# Patient Record
Sex: FEMALE | Race: BLACK OR AFRICAN AMERICAN | Employment: FULL TIME | URBAN - METROPOLITAN AREA
[De-identification: names, ages, dates, MRNs, and addresses within clinical notes are randomized per-mention and may not be internally consistent; named-entity substitution may affect disease eponyms.]

---

## 2017-08-04 LAB
AVERAGE GLUCOSE: NORMAL
HBA1C MFR BLD: 12 %

## 2018-09-26 ENCOUNTER — TELEPHONE (OUTPATIENT)
Dept: PRIMARY CARE CLINIC | Age: 33
End: 2018-09-26

## 2018-09-26 NOTE — TELEPHONE ENCOUNTER
Pt would like to Re-Establish care with Dr. Marcelino Jauregui last seen in 2014  Pls call pt if able to reschedule

## 2018-11-03 ENCOUNTER — HOSPITAL ENCOUNTER (EMERGENCY)
Age: 33
Discharge: HOME | End: 2018-11-04
Attending: EMERGENCY MEDICINE
Payer: COMMERCIAL

## 2018-11-03 VITALS
DIASTOLIC BLOOD PRESSURE: 123 MMHG | TEMPERATURE: 97.8 F | OXYGEN SATURATION: 100 % | HEART RATE: 109 BPM | SYSTOLIC BLOOD PRESSURE: 159 MMHG

## 2018-11-03 DIAGNOSIS — R20.2 PARESTHESIA: Primary | ICD-10-CM

## 2018-11-03 LAB
BACTERIA: ABNORMAL /HPF
BILIRUBIN URINE: NEGATIVE MG/DL
BLOOD, URINE: NEGATIVE
CLARITY: ABNORMAL
COLOR: ABNORMAL
EPITHELIAL CELLS, UA: ABNORMAL /HPF
GLUCOSE BLD-MCNC: 204 MG/DL (ref 70–99)
GLUCOSE URINE: 500 MG/DL
KETONES, URINE: NEGATIVE MG/DL
LEUKOCYTE ESTERASE, URINE: NEGATIVE
MICROSCOPIC EXAMINATION: YES
NITRITE, URINE: NEGATIVE
PERFORMED ON: ABNORMAL
PH UA: 6
PREGNANCY, URINE: NEGATIVE
PROTEIN UA: 30 MG/DL
RBC UA: ABNORMAL /HPF (ref 0–2)
SPECIFIC GRAVITY UA: 1.01
UROBILINOGEN, URINE: 0.2 E.U./DL
WBC UA: ABNORMAL /HPF (ref 0–5)

## 2018-11-03 PROCEDURE — 81001 URINALYSIS AUTO W/SCOPE: CPT

## 2018-11-03 PROCEDURE — 83735 ASSAY OF MAGNESIUM: CPT

## 2018-11-03 PROCEDURE — 84703 CHORIONIC GONADOTROPIN ASSAY: CPT

## 2018-11-03 PROCEDURE — 80048 BASIC METABOLIC PNL TOTAL CA: CPT

## 2018-11-03 PROCEDURE — 83036 HEMOGLOBIN GLYCOSYLATED A1C: CPT

## 2018-11-03 PROCEDURE — 84100 ASSAY OF PHOSPHORUS: CPT

## 2018-11-03 PROCEDURE — 99284 EMERGENCY DEPT VISIT MOD MDM: CPT

## 2018-11-03 PROCEDURE — 85025 COMPLETE CBC W/AUTO DIFF WBC: CPT

## 2018-11-04 LAB
ANION GAP SERPL CALCULATED.3IONS-SCNC: 14 MMOL/L (ref 3–16)
BASOPHILS ABSOLUTE: 0 K/UL (ref 0–0.2)
BASOPHILS RELATIVE PERCENT: 0.7 %
BUN BLDV-MCNC: 12 MG/DL (ref 7–20)
CALCIUM SERPL-MCNC: 9.7 MG/DL (ref 8.3–10.6)
CHLORIDE BLD-SCNC: 97 MMOL/L (ref 99–110)
CO2: 26 MMOL/L (ref 21–32)
CREAT SERPL-MCNC: 0.6 MG/DL (ref 0.6–1.1)
EOSINOPHILS ABSOLUTE: 0.1 K/UL (ref 0–0.6)
EOSINOPHILS RELATIVE PERCENT: 2.1 %
ESTIMATED AVERAGE GLUCOSE: 320.7 MG/DL
GFR AFRICAN AMERICAN: >60
GFR NON-AFRICAN AMERICAN: >60
GLUCOSE BLD-MCNC: 186 MG/DL (ref 70–99)
HBA1C MFR BLD: 12.8 %
HCT VFR BLD CALC: 39.8 % (ref 36–48)
HEMOGLOBIN: 13.4 G/DL (ref 12–16)
LYMPHOCYTES ABSOLUTE: 3.4 K/UL (ref 1–5.1)
LYMPHOCYTES RELATIVE PERCENT: 55.9 %
MAGNESIUM: 1.7 MG/DL (ref 1.8–2.4)
MCH RBC QN AUTO: 28.7 PG (ref 26–34)
MCHC RBC AUTO-ENTMCNC: 33.7 G/DL (ref 31–36)
MCV RBC AUTO: 85.1 FL (ref 80–100)
MONOCYTES ABSOLUTE: 0.4 K/UL (ref 0–1.3)
MONOCYTES RELATIVE PERCENT: 6.7 %
NEUTROPHILS ABSOLUTE: 2.1 K/UL (ref 1.7–7.7)
NEUTROPHILS RELATIVE PERCENT: 34.6 %
PDW BLD-RTO: 13.5 % (ref 12.4–15.4)
PHOSPHORUS: 3.9 MG/DL (ref 2.5–4.9)
PLATELET # BLD: 282 K/UL (ref 135–450)
PMV BLD AUTO: 7.8 FL (ref 5–10.5)
POTASSIUM SERPL-SCNC: 3.9 MMOL/L (ref 3.5–5.1)
RBC # BLD: 4.67 M/UL (ref 4–5.2)
SODIUM BLD-SCNC: 137 MMOL/L (ref 136–145)
WBC # BLD: 6.2 K/UL (ref 4–11)

## 2018-11-04 RX ORDER — GLIPIZIDE 5 MG/1
5 TABLET ORAL
Qty: 20 TABLET | Refills: 0 | Status: SHIPPED | OUTPATIENT
Start: 2018-11-04 | End: 2018-11-20 | Stop reason: SDUPTHER

## 2018-11-04 RX ORDER — METFORMIN HYDROCHLORIDE 500 MG/1
500 TABLET, EXTENDED RELEASE ORAL
Qty: 14 TABLET | Refills: 0 | Status: SHIPPED | OUTPATIENT
Start: 2018-11-04 | End: 2018-11-20 | Stop reason: SDUPTHER

## 2018-11-04 RX ORDER — GABAPENTIN 100 MG/1
100 CAPSULE ORAL 2 TIMES DAILY
Qty: 20 CAPSULE | Refills: 0 | Status: SHIPPED | OUTPATIENT
Start: 2018-11-04 | End: 2018-11-20

## 2018-11-04 NOTE — ED PROVIDER NOTES
Date ofevaluation: 11/3/2018    Chief Complaint   Emesis (Pt has been getting sick for the past two days. Pt took glipizide 20 mg from a nurse at work at [phone], but pt does not take any medications normally. BS went from 500s to 200s in an hour.) and Hyperglycemia      Nursing Notes, Past Medical Hx, Past Surgical Hx, Social Hx, Allergies, and Family Hx were reviewed. History of Present Illness     Maisha Paul is a 35 y.o. female who presents For evaluation of left handed numbness as well as bilateral feet tingling and hyperglycemia at work today. She describes chronic left handed numbness, and bilateral lower extremity feet tingling for many years. There is tardive augmentation she's been seen for this before. Patient also has a history of hypertension, diabetes. She states she has been noncompliant with medications for greater than 1 year secondary to being lost to follow-up secondary to consultations related to insurance another interpersonal issues. Patient is otherwise without chest pain, shortness of breath, lightheadedness, dizziness, visual, or other new onset motor sensory deficits.     Review of Systems     Review of Systems     10 point review of systems was performed at bedside and negative unless otherwise specified in HPI    Past Medical, Surgical, Family, and Social History         Diagnosis Date    Asthma     Hypertension     Miscarriage     Morbid obesity ([facility] [address]     Type II or unspecified type diabetes mellitus without mention of complication, not stated as uncontrolled     Unspecified noninflammatory disorder of vulva and perineum          Procedure Laterality Date    CHOLECYSTECTOMY, LAPAROSCOPIC  2/07    OTHER SURGICAL HISTORY  07/18/2014    COLPOSCOPY OF VULVA, VAGINA AND CERVIX WITH CO-2 LASER     Her family history includes Asthma in her father; Breast Cancer in her maternal grandmother; Cancer in her maternal grandmother; Diabetes in her paternal grandmother; High Blood

## 2018-11-20 ENCOUNTER — OFFICE VISIT (OUTPATIENT)
Dept: PRIMARY CARE CLINIC | Age: 33
End: 2018-11-20
Payer: COMMERCIAL

## 2018-11-20 VITALS
SYSTOLIC BLOOD PRESSURE: 198 MMHG | TEMPERATURE: 97.9 F | BODY MASS INDEX: 44.42 KG/M2 | WEIGHT: 283 LBS | OXYGEN SATURATION: 99 % | HEART RATE: 118 BPM | DIASTOLIC BLOOD PRESSURE: 116 MMHG | HEIGHT: 67 IN

## 2018-11-20 DIAGNOSIS — M25.561 CHRONIC PAIN OF RIGHT KNEE: ICD-10-CM

## 2018-11-20 DIAGNOSIS — I10 HYPERTENSION, UNSPECIFIED TYPE: Primary | ICD-10-CM

## 2018-11-20 DIAGNOSIS — G89.29 CHRONIC PAIN OF RIGHT KNEE: ICD-10-CM

## 2018-11-20 DIAGNOSIS — E66.01 MORBID OBESITY (HCC): ICD-10-CM

## 2018-11-20 LAB
GLUCOSE BLD-MCNC: 285 MG/DL
HBA1C MFR BLD: 12.2 %

## 2018-11-20 PROCEDURE — G8484 FLU IMMUNIZE NO ADMIN: HCPCS | Performed by: FAMILY MEDICINE

## 2018-11-20 PROCEDURE — 96160 PT-FOCUSED HLTH RISK ASSMT: CPT | Performed by: FAMILY MEDICINE

## 2018-11-20 PROCEDURE — G8427 DOCREV CUR MEDS BY ELIG CLIN: HCPCS | Performed by: FAMILY MEDICINE

## 2018-11-20 PROCEDURE — 99204 OFFICE O/P NEW MOD 45 MIN: CPT | Performed by: FAMILY MEDICINE

## 2018-11-20 PROCEDURE — G8417 CALC BMI ABV UP PARAM F/U: HCPCS | Performed by: FAMILY MEDICINE

## 2018-11-20 PROCEDURE — 82962 GLUCOSE BLOOD TEST: CPT | Performed by: FAMILY MEDICINE

## 2018-11-20 PROCEDURE — 1036F TOBACCO NON-USER: CPT | Performed by: FAMILY MEDICINE

## 2018-11-20 PROCEDURE — 93000 ELECTROCARDIOGRAM COMPLETE: CPT | Performed by: FAMILY MEDICINE

## 2018-11-20 PROCEDURE — 2022F DILAT RTA XM EVC RTNOPTHY: CPT | Performed by: FAMILY MEDICINE

## 2018-11-20 PROCEDURE — 83036 HEMOGLOBIN GLYCOSYLATED A1C: CPT | Performed by: FAMILY MEDICINE

## 2018-11-20 PROCEDURE — 3046F HEMOGLOBIN A1C LEVEL >9.0%: CPT | Performed by: FAMILY MEDICINE

## 2018-11-20 RX ORDER — DULOXETIN HYDROCHLORIDE 30 MG/1
30 CAPSULE, DELAYED RELEASE ORAL 2 TIMES DAILY
Qty: 30 CAPSULE | Refills: 2 | Status: SHIPPED | OUTPATIENT
Start: 2018-11-20 | End: 2019-11-19 | Stop reason: SDUPTHER

## 2018-11-20 RX ORDER — TIZANIDINE HYDROCHLORIDE 6 MG/1
6 CAPSULE, GELATIN COATED ORAL 2 TIMES DAILY
COMMUNITY
End: 2018-12-19

## 2018-11-20 RX ORDER — GLUCOSAMINE HCL/CHONDROITIN SU 500-400 MG
CAPSULE ORAL
Qty: 100 STRIP | Refills: 11 | Status: SHIPPED | OUTPATIENT
Start: 2018-11-20 | End: 2019-11-19 | Stop reason: SDUPTHER

## 2018-11-20 RX ORDER — PEN NEEDLE, DIABETIC 31 GX5/16"
NEEDLE, DISPOSABLE MISCELLANEOUS
Qty: 100 EACH | Refills: 10 | Status: SHIPPED | OUTPATIENT
Start: 2018-11-20 | End: 2019-11-19 | Stop reason: SDUPTHER

## 2018-11-20 RX ORDER — LISINOPRIL AND HYDROCHLOROTHIAZIDE 25; 20 MG/1; MG/1
1 TABLET ORAL DAILY
Qty: 30 TABLET | Refills: 2 | Status: SHIPPED | OUTPATIENT
Start: 2018-11-20 | End: 2019-11-19 | Stop reason: SINTOL

## 2018-11-20 RX ORDER — GLIPIZIDE 5 MG/1
5 TABLET ORAL
Qty: 60 TABLET | Refills: 0 | Status: SHIPPED | OUTPATIENT
Start: 2018-11-20 | End: 2019-02-05 | Stop reason: SDUPTHER

## 2018-11-20 RX ORDER — AMLODIPINE BESYLATE 5 MG/1
5 TABLET ORAL DAILY
Qty: 30 TABLET | Refills: 2 | Status: SHIPPED | OUTPATIENT
Start: 2018-11-20 | End: 2018-12-17 | Stop reason: SDUPTHER

## 2018-11-20 RX ORDER — GABAPENTIN 300 MG/1
300 CAPSULE ORAL 2 TIMES DAILY
Qty: 60 CAPSULE | Refills: 5 | Status: SHIPPED | OUTPATIENT
Start: 2018-11-20 | End: 2019-11-19 | Stop reason: SDUPTHER

## 2018-11-20 RX ORDER — LANCETS 30 GAUGE
EACH MISCELLANEOUS
Qty: 100 EACH | Refills: 10 | Status: SHIPPED | OUTPATIENT
Start: 2018-11-20 | End: 2019-11-19 | Stop reason: SDUPTHER

## 2018-11-20 RX ORDER — ACETAMINOPHEN 500 MG
TABLET ORAL
Qty: 120 TABLET | Refills: 3 | Status: SHIPPED | OUTPATIENT
Start: 2018-11-20 | End: 2021-11-11 | Stop reason: SDUPTHER

## 2018-11-20 RX ORDER — METFORMIN HYDROCHLORIDE 500 MG/1
500 TABLET, EXTENDED RELEASE ORAL
Qty: 30 TABLET | Refills: 0 | Status: SHIPPED | OUTPATIENT
Start: 2018-11-20 | End: 2019-02-05 | Stop reason: SDUPTHER

## 2018-11-20 RX ORDER — DULOXETIN HYDROCHLORIDE 30 MG/1
30 CAPSULE, DELAYED RELEASE ORAL 2 TIMES DAILY
COMMUNITY
End: 2018-11-20 | Stop reason: SDUPTHER

## 2018-11-20 RX ORDER — TIZANIDINE 4 MG/1
TABLET ORAL
Qty: 30 TABLET | Refills: 0 | Status: SHIPPED | OUTPATIENT
Start: 2018-11-20 | End: 2018-12-19 | Stop reason: SDUPTHER

## 2018-11-20 ASSESSMENT — ENCOUNTER SYMPTOMS
DIARRHEA: 0
EYE ITCHING: 0
BACK PAIN: 0
CONSTIPATION: 0
RHINORRHEA: 0
SCALP TENDERNESS: 0
APNEA: 0
FACIAL SWEATING: 0
RECTAL PAIN: 0
PHOTOPHOBIA: 0
BLURRED VISION: 0
EYE REDNESS: 0
STRIDOR: 0
NAUSEA: 0
WHEEZING: 0
SORE THROAT: 0
CHOKING: 0
EYE PAIN: 0
EYE DISCHARGE: 0
EYE WATERING: 0
SHORTNESS OF BREATH: 0
BLOOD IN STOOL: 0
ABDOMINAL DISTENTION: 0
VOMITING: 0
SINUS PRESSURE: 0
CHEST TIGHTNESS: 0
COLOR CHANGE: 0
TROUBLE SWALLOWING: 0
COUGH: 0

## 2018-11-20 ASSESSMENT — PATIENT HEALTH QUESTIONNAIRE - PHQ9
2. FEELING DOWN, DEPRESSED OR HOPELESS: 3
9. THOUGHTS THAT YOU WOULD BE BETTER OFF DEAD, OR OF HURTING YOURSELF: 3
6. FEELING BAD ABOUT YOURSELF - OR THAT YOU ARE A FAILURE OR HAVE LET YOURSELF OR YOUR FAMILY DOWN: 0
1. LITTLE INTEREST OR PLEASURE IN DOING THINGS: 3
5. POOR APPETITE OR OVEREATING: 2
SUM OF ALL RESPONSES TO PHQ QUESTIONS 1-9: 21
SUM OF ALL RESPONSES TO PHQ9 QUESTIONS 1 & 2: 6
3. TROUBLE FALLING OR STAYING ASLEEP: 2
4. FEELING TIRED OR HAVING LITTLE ENERGY: 3
8. MOVING OR SPEAKING SO SLOWLY THAT OTHER PEOPLE COULD HAVE NOTICED. OR THE OPPOSITE, BEING SO FIGETY OR RESTLESS THAT YOU HAVE BEEN MOVING AROUND A LOT MORE THAN USUAL: 2
SUM OF ALL RESPONSES TO PHQ QUESTIONS 1-9: 21
10. IF YOU CHECKED OFF ANY PROBLEMS, HOW DIFFICULT HAVE THESE PROBLEMS MADE IT FOR YOU TO DO YOUR WORK, TAKE CARE OF THINGS AT HOME, OR GET ALONG WITH OTHER PEOPLE: 3
7. TROUBLE CONCENTRATING ON THINGS, SUCH AS READING THE NEWSPAPER OR WATCHING TELEVISION: 3

## 2018-11-20 NOTE — PROGRESS NOTES
Subjective:      Patient ID: Elisa Hernandez is a 35 y.o. female. New pt visit for dm-2, neuropathy, obesity, numbness left hand, hypertension  Headache    This is a chronic problem. The current episode started more than 1 month ago. The problem occurs constantly. The problem has been waxing and waning. Pain location: frontal area, nausea. The pain does not radiate. The pain is at a severity of 6/10. The pain is moderate. Associated symptoms include numbness. Pertinent negatives include no back pain, blurred vision, coughing, dizziness, drainage, ear pain, eye pain, eye redness, eye watering, facial sweating, fever, hearing loss, insomnia, loss of balance, muscle aches, nausea, neck pain, phonophobia, photophobia, rhinorrhea, scalp tenderness, seizures, sinus pressure, sore throat, tingling, tinnitus, vomiting, weakness or weight loss. Nothing aggravates the symptoms. She has tried acetaminophen and NSAIDs for the symptoms. The treatment provided no relief. Her past medical history is significant for obesity. There is no history of cancer, cluster headaches, hypertension, migraine headaches, migraines in the family, pseudotumor cerebri, recent head traumas, sinus disease or TMJ.   34 y/o female who is known to have poorly controlled diabetes, poorly controlled hypertension, numbness/burning pains of both feet present for 2 years. Has been seen 2 years ago while hosp. At [facility] for diagnosed neuropathy and placed  On gabapentin at that time. No fu with neurology. Current dose of gabapentin is 600 mg bid without completed relief. Has had type 2 diabetes since age 32, poorly controlled. Last AIC was \"12\". No blurred vision. Wt gain. Excessive water drinking, increased appetite. Polyuria  For past few months .  She has been out of glipizide and metformin for a few months  Until recent er visit for the same on 11/3/18, where she got refills    She has hypertension , see hpi  She has headaches, see   Has chronic R knee

## 2018-11-21 RX ORDER — TRAMADOL HYDROCHLORIDE 50 MG/1
50 TABLET ORAL EVERY 4 HOURS PRN
Qty: 18 TABLET | Refills: 0 | Status: SHIPPED | OUTPATIENT
Start: 2018-11-21 | End: 2018-11-24

## 2018-12-14 ENCOUNTER — TELEPHONE (OUTPATIENT)
Dept: PRIMARY CARE CLINIC | Age: 33
End: 2018-12-14

## 2018-12-14 ENCOUNTER — ANESTHESIA (OUTPATIENT)
Dept: OPERATING ROOM | Age: 33
End: 2018-12-14
Payer: COMMERCIAL

## 2018-12-14 ENCOUNTER — ANESTHESIA EVENT (OUTPATIENT)
Dept: OPERATING ROOM | Age: 33
End: 2018-12-14
Payer: COMMERCIAL

## 2018-12-14 ENCOUNTER — HOSPITAL ENCOUNTER (OUTPATIENT)
Age: 33
LOS: 1 days | Discharge: HOME | End: 2018-12-14
Attending: EMERGENCY MEDICINE | Admitting: SURGERY
Payer: COMMERCIAL

## 2018-12-14 ENCOUNTER — APPOINTMENT (OUTPATIENT)
Dept: CT IMAGING | Age: 33
End: 2018-12-14
Payer: COMMERCIAL

## 2018-12-14 VITALS
OXYGEN SATURATION: 100 % | SYSTOLIC BLOOD PRESSURE: 105 MMHG | TEMPERATURE: 98.6 F | DIASTOLIC BLOOD PRESSURE: 62 MMHG | RESPIRATION RATE: 7 BRPM

## 2018-12-14 VITALS
HEART RATE: 109 BPM | DIASTOLIC BLOOD PRESSURE: 99 MMHG | RESPIRATION RATE: 18 BRPM | SYSTOLIC BLOOD PRESSURE: 117 MMHG | HEIGHT: 67 IN | OXYGEN SATURATION: 100 % | WEIGHT: 293 LBS | BODY MASS INDEX: 45.99 KG/M2 | TEMPERATURE: 98.6 F

## 2018-12-14 DIAGNOSIS — I10 HYPERTENSION, UNSPECIFIED TYPE: ICD-10-CM

## 2018-12-14 DIAGNOSIS — K35.20 ACUTE APPENDICITIS WITH GENERALIZED PERITONITIS, WITHOUT GANGRENE OR ABSCESS, UNSPECIFIED WHETHER PERFORATION PRESENT: Primary | ICD-10-CM

## 2018-12-14 PROBLEM — K35.80 ACUTE APPENDICITIS: Status: ACTIVE | Noted: 2018-12-14

## 2018-12-14 PROBLEM — K35.80 APPENDICITIS, ACUTE: Status: ACTIVE | Noted: 2018-12-14

## 2018-12-14 LAB
A/G RATIO: 1.1 (ref 1.1–2.2)
ALBUMIN SERPL-MCNC: 4.1 G/DL (ref 3.4–5)
ALP BLD-CCNC: 84 U/L (ref 40–129)
ALT SERPL-CCNC: 9 U/L (ref 10–40)
ANION GAP SERPL CALCULATED.3IONS-SCNC: 14 MMOL/L (ref 3–16)
AST SERPL-CCNC: 9 U/L (ref 15–37)
BASOPHILS ABSOLUTE: 0 K/UL (ref 0–0.2)
BASOPHILS RELATIVE PERCENT: 0.9 %
BILIRUB SERPL-MCNC: 0.3 MG/DL (ref 0–1)
BILIRUBIN URINE: NEGATIVE
BLOOD, URINE: NEGATIVE
BUN BLDV-MCNC: 15 MG/DL (ref 7–20)
CALCIUM SERPL-MCNC: 9.7 MG/DL (ref 8.3–10.6)
CHLORIDE BLD-SCNC: 98 MMOL/L (ref 99–110)
CLARITY: CLEAR
CO2: 25 MMOL/L (ref 21–32)
COLOR: YELLOW
CREAT SERPL-MCNC: 0.6 MG/DL (ref 0.6–1.1)
EOSINOPHILS ABSOLUTE: 0 K/UL (ref 0–0.6)
EOSINOPHILS RELATIVE PERCENT: 0.9 %
EPITHELIAL CELLS, UA: 2 /HPF (ref 0–5)
GFR AFRICAN AMERICAN: >60
GFR NON-AFRICAN AMERICAN: >60
GLOBULIN: 3.7 G/DL
GLUCOSE BLD-MCNC: 184 MG/DL (ref 70–99)
GLUCOSE BLD-MCNC: 194 MG/DL (ref 70–99)
GLUCOSE BLD-MCNC: 236 MG/DL (ref 70–99)
GLUCOSE BLD-MCNC: 325 MG/DL (ref 70–99)
GLUCOSE URINE: >=1000 MG/DL
HCG(URINE) PREGNANCY TEST: NEGATIVE
HCT VFR BLD CALC: 38.8 % (ref 36–48)
HEMOGLOBIN: 12.7 G/DL (ref 12–16)
HYALINE CASTS: 4 /LPF (ref 0–8)
KETONES, URINE: NEGATIVE MG/DL
LEUKOCYTE ESTERASE, URINE: NEGATIVE
LIPASE: 17 U/L (ref 13–60)
LYMPHOCYTES ABSOLUTE: 2.5 K/UL (ref 1–5.1)
LYMPHOCYTES RELATIVE PERCENT: 51.5 %
MCH RBC QN AUTO: 28.4 PG (ref 26–34)
MCHC RBC AUTO-ENTMCNC: 32.8 G/DL (ref 31–36)
MCV RBC AUTO: 86.6 FL (ref 80–100)
MICROSCOPIC EXAMINATION: YES
MONOCYTES ABSOLUTE: 0.3 K/UL (ref 0–1.3)
MONOCYTES RELATIVE PERCENT: 6.8 %
NEUTROPHILS ABSOLUTE: 2 K/UL (ref 1.7–7.7)
NEUTROPHILS RELATIVE PERCENT: 39.9 %
NITRITE, URINE: NEGATIVE
PDW BLD-RTO: 13.2 % (ref 12.4–15.4)
PERFORMED ON: ABNORMAL
PH UA: 5.5
PLATELET # BLD: 335 K/UL (ref 135–450)
PMV BLD AUTO: 7.4 FL (ref 5–10.5)
POTASSIUM REFLEX MAGNESIUM: 4.1 MMOL/L (ref 3.5–5.1)
PROTEIN UA: 30 MG/DL
RBC # BLD: 4.48 M/UL (ref 4–5.2)
RBC UA: 6 /HPF (ref 0–4)
SODIUM BLD-SCNC: 137 MMOL/L (ref 136–145)
SPECIFIC GRAVITY UA: >1.03
TOTAL PROTEIN: 7.8 G/DL (ref 6.4–8.2)
URINE TYPE: ABNORMAL
UROBILINOGEN, URINE: 0.2 E.U./DL
WBC # BLD: 4.9 K/UL (ref 4–11)
WBC UA: 2 /HPF (ref 0–5)

## 2018-12-14 PROCEDURE — 74177 CT ABD & PELVIS W/CONTRAST: CPT

## 2018-12-14 PROCEDURE — 2500000003 HC RX 250 WO HCPCS: Performed by: EMERGENCY MEDICINE

## 2018-12-14 PROCEDURE — 84703 CHORIONIC GONADOTROPIN ASSAY: CPT

## 2018-12-14 PROCEDURE — 2500000003 HC RX 250 WO HCPCS: Performed by: SURGERY

## 2018-12-14 PROCEDURE — 88304 TISSUE EXAM BY PATHOLOGIST: CPT

## 2018-12-14 PROCEDURE — 2580000003 HC RX 258: Performed by: SURGERY

## 2018-12-14 PROCEDURE — 83690 ASSAY OF LIPASE: CPT

## 2018-12-14 PROCEDURE — 6360000002 HC RX W HCPCS: Performed by: SURGERY

## 2018-12-14 PROCEDURE — 36415 COLL VENOUS BLD VENIPUNCTURE: CPT

## 2018-12-14 PROCEDURE — 3700000000 HC ANESTHESIA ATTENDED CARE: Performed by: SURGERY

## 2018-12-14 PROCEDURE — 6360000004 HC RX CONTRAST MEDICATION: Performed by: EMERGENCY MEDICINE

## 2018-12-14 PROCEDURE — 96375 TX/PRO/DX INJ NEW DRUG ADDON: CPT

## 2018-12-14 PROCEDURE — 7100000001 HC PACU RECOVERY - ADDTL 15 MIN: Performed by: SURGERY

## 2018-12-14 PROCEDURE — 6370000000 HC RX 637 (ALT 250 FOR IP): Performed by: NURSE PRACTITIONER

## 2018-12-14 PROCEDURE — 2500000003 HC RX 250 WO HCPCS: Performed by: NURSE ANESTHETIST, CERTIFIED REGISTERED

## 2018-12-14 PROCEDURE — 3600000014 HC SURGERY LEVEL 4 ADDTL 15MIN: Performed by: SURGERY

## 2018-12-14 PROCEDURE — 2580000003 HC RX 258: Performed by: EMERGENCY MEDICINE

## 2018-12-14 PROCEDURE — 2720000010 HC SURG SUPPLY STERILE: Performed by: SURGERY

## 2018-12-14 PROCEDURE — 80053 COMPREHEN METABOLIC PANEL: CPT

## 2018-12-14 PROCEDURE — 99223 1ST HOSP IP/OBS HIGH 75: CPT | Performed by: SURGERY

## 2018-12-14 PROCEDURE — 44970 LAPAROSCOPY APPENDECTOMY: CPT | Performed by: SURGERY

## 2018-12-14 PROCEDURE — 6360000002 HC RX W HCPCS: Performed by: EMERGENCY MEDICINE

## 2018-12-14 PROCEDURE — 96365 THER/PROPH/DIAG IV INF INIT: CPT

## 2018-12-14 PROCEDURE — 7100000000 HC PACU RECOVERY - FIRST 15 MIN: Performed by: SURGERY

## 2018-12-14 PROCEDURE — 94760 N-INVAS EAR/PLS OXIMETRY 1: CPT

## 2018-12-14 PROCEDURE — 6360000002 HC RX W HCPCS: Performed by: ANESTHESIOLOGY

## 2018-12-14 PROCEDURE — 3600000004 HC SURGERY LEVEL 4 BASE: Performed by: SURGERY

## 2018-12-14 PROCEDURE — 6370000000 HC RX 637 (ALT 250 FOR IP): Performed by: SURGERY

## 2018-12-14 PROCEDURE — 99285 EMERGENCY DEPT VISIT HI MDM: CPT

## 2018-12-14 PROCEDURE — 6360000002 HC RX W HCPCS: Performed by: NURSE ANESTHETIST, CERTIFIED REGISTERED

## 2018-12-14 PROCEDURE — 85025 COMPLETE CBC W/AUTO DIFF WBC: CPT

## 2018-12-14 PROCEDURE — 3700000001 HC ADD 15 MINUTES (ANESTHESIA): Performed by: SURGERY

## 2018-12-14 PROCEDURE — 2780000010 HC IMPLANT OTHER: Performed by: SURGERY

## 2018-12-14 PROCEDURE — 2709999900 HC NON-CHARGEABLE SUPPLY: Performed by: SURGERY

## 2018-12-14 PROCEDURE — 81001 URINALYSIS AUTO W/SCOPE: CPT

## 2018-12-14 PROCEDURE — 96361 HYDRATE IV INFUSION ADD-ON: CPT

## 2018-12-14 RX ORDER — MIDAZOLAM HYDROCHLORIDE 1 MG/ML
INJECTION INTRAMUSCULAR; INTRAVENOUS PRN
Status: DISCONTINUED | OUTPATIENT
Start: 2018-12-14 | End: 2018-12-14 | Stop reason: SDUPTHER

## 2018-12-14 RX ORDER — ROCURONIUM BROMIDE 10 MG/ML
INJECTION, SOLUTION INTRAVENOUS PRN
Status: DISCONTINUED | OUTPATIENT
Start: 2018-12-14 | End: 2018-12-14 | Stop reason: SDUPTHER

## 2018-12-14 RX ORDER — KETOROLAC TROMETHAMINE 30 MG/ML
15 INJECTION, SOLUTION INTRAMUSCULAR; INTRAVENOUS ONCE
Status: COMPLETED | OUTPATIENT
Start: 2018-12-14 | End: 2018-12-14

## 2018-12-14 RX ORDER — DEXTROSE MONOHYDRATE 25 G/50ML
12.5 INJECTION, SOLUTION INTRAVENOUS PRN
Status: DISCONTINUED | OUTPATIENT
Start: 2018-12-14 | End: 2018-12-14 | Stop reason: HOSPADM

## 2018-12-14 RX ORDER — HYDROMORPHONE HCL 110MG/55ML
PATIENT CONTROLLED ANALGESIA SYRINGE INTRAVENOUS PRN
Status: DISCONTINUED | OUTPATIENT
Start: 2018-12-14 | End: 2018-12-14 | Stop reason: SDUPTHER

## 2018-12-14 RX ORDER — HYDROCODONE BITARTRATE AND ACETAMINOPHEN 5; 325 MG/1; MG/1
1 TABLET ORAL EVERY 6 HOURS PRN
Qty: 20 TABLET | Refills: 0 | Status: SHIPPED | OUTPATIENT
Start: 2018-12-14 | End: 2018-12-19

## 2018-12-14 RX ORDER — SODIUM CHLORIDE, SODIUM LACTATE, POTASSIUM CHLORIDE, CALCIUM CHLORIDE 600; 310; 30; 20 MG/100ML; MG/100ML; MG/100ML; MG/100ML
INJECTION, SOLUTION INTRAVENOUS CONTINUOUS
Status: DISCONTINUED | OUTPATIENT
Start: 2018-12-14 | End: 2018-12-14 | Stop reason: HOSPADM

## 2018-12-14 RX ORDER — OXYCODONE HYDROCHLORIDE 5 MG/1
5 TABLET ORAL PRN
Status: DISCONTINUED | OUTPATIENT
Start: 2018-12-14 | End: 2018-12-14 | Stop reason: HOSPADM

## 2018-12-14 RX ORDER — FENTANYL CITRATE 50 UG/ML
25 INJECTION, SOLUTION INTRAMUSCULAR; INTRAVENOUS EVERY 5 MIN PRN
Status: DISCONTINUED | OUTPATIENT
Start: 2018-12-14 | End: 2018-12-14 | Stop reason: HOSPADM

## 2018-12-14 RX ORDER — DEXTROSE MONOHYDRATE 50 MG/ML
100 INJECTION, SOLUTION INTRAVENOUS PRN
Status: DISCONTINUED | OUTPATIENT
Start: 2018-12-14 | End: 2018-12-14 | Stop reason: HOSPADM

## 2018-12-14 RX ORDER — OXYCODONE HYDROCHLORIDE 5 MG/1
10 TABLET ORAL PRN
Status: DISCONTINUED | OUTPATIENT
Start: 2018-12-14 | End: 2018-12-14 | Stop reason: HOSPADM

## 2018-12-14 RX ORDER — PROPOFOL 10 MG/ML
INJECTION, EMULSION INTRAVENOUS PRN
Status: DISCONTINUED | OUTPATIENT
Start: 2018-12-14 | End: 2018-12-14 | Stop reason: SDUPTHER

## 2018-12-14 RX ORDER — NICOTINE POLACRILEX 4 MG
15 LOZENGE BUCCAL PRN
Status: DISCONTINUED | OUTPATIENT
Start: 2018-12-14 | End: 2018-12-14 | Stop reason: HOSPADM

## 2018-12-14 RX ORDER — LIDOCAINE HYDROCHLORIDE 20 MG/ML
INJECTION, SOLUTION INFILTRATION; PERINEURAL PRN
Status: DISCONTINUED | OUTPATIENT
Start: 2018-12-14 | End: 2018-12-14 | Stop reason: SDUPTHER

## 2018-12-14 RX ORDER — 0.9 % SODIUM CHLORIDE 0.9 %
1000 INTRAVENOUS SOLUTION INTRAVENOUS ONCE
Status: COMPLETED | OUTPATIENT
Start: 2018-12-14 | End: 2018-12-14

## 2018-12-14 RX ORDER — BUPIVACAINE HYDROCHLORIDE 5 MG/ML
INJECTION, SOLUTION EPIDURAL; INTRACAUDAL
Status: COMPLETED | OUTPATIENT
Start: 2018-12-14 | End: 2018-12-14

## 2018-12-14 RX ORDER — MORPHINE SULFATE 2 MG/ML
2 INJECTION, SOLUTION INTRAMUSCULAR; INTRAVENOUS EVERY 5 MIN PRN
Status: DISCONTINUED | OUTPATIENT
Start: 2018-12-14 | End: 2018-12-14 | Stop reason: HOSPADM

## 2018-12-14 RX ORDER — AMLODIPINE BESYLATE 5 MG/1
5 TABLET ORAL DAILY
Status: DISCONTINUED | OUTPATIENT
Start: 2018-12-15 | End: 2018-12-14 | Stop reason: HOSPADM

## 2018-12-14 RX ORDER — SODIUM CHLORIDE 0.9 % (FLUSH) 0.9 %
10 SYRINGE (ML) INJECTION EVERY 12 HOURS SCHEDULED
Status: DISCONTINUED | OUTPATIENT
Start: 2018-12-14 | End: 2018-12-14 | Stop reason: HOSPADM

## 2018-12-14 RX ORDER — CIPROFLOXACIN 2 MG/ML
400 INJECTION, SOLUTION INTRAVENOUS ONCE
Status: COMPLETED | OUTPATIENT
Start: 2018-12-14 | End: 2018-12-14

## 2018-12-14 RX ORDER — MORPHINE SULFATE 2 MG/ML
2 INJECTION, SOLUTION INTRAMUSCULAR; INTRAVENOUS
Status: DISCONTINUED | OUTPATIENT
Start: 2018-12-14 | End: 2018-12-14 | Stop reason: HOSPADM

## 2018-12-14 RX ORDER — MEPERIDINE HYDROCHLORIDE 25 MG/ML
12.5 INJECTION INTRAMUSCULAR; INTRAVENOUS; SUBCUTANEOUS EVERY 5 MIN PRN
Status: DISCONTINUED | OUTPATIENT
Start: 2018-12-14 | End: 2018-12-14 | Stop reason: HOSPADM

## 2018-12-14 RX ORDER — FENTANYL CITRATE 50 UG/ML
50 INJECTION, SOLUTION INTRAMUSCULAR; INTRAVENOUS EVERY 5 MIN PRN
Status: COMPLETED | OUTPATIENT
Start: 2018-12-14 | End: 2018-12-14

## 2018-12-14 RX ORDER — FENTANYL CITRATE 50 UG/ML
INJECTION, SOLUTION INTRAMUSCULAR; INTRAVENOUS PRN
Status: DISCONTINUED | OUTPATIENT
Start: 2018-12-14 | End: 2018-12-14 | Stop reason: SDUPTHER

## 2018-12-14 RX ORDER — HYDROCODONE BITARTRATE AND ACETAMINOPHEN 5; 325 MG/1; MG/1
2 TABLET ORAL EVERY 4 HOURS PRN
Status: DISCONTINUED | OUTPATIENT
Start: 2018-12-14 | End: 2018-12-14 | Stop reason: HOSPADM

## 2018-12-14 RX ORDER — SODIUM CHLORIDE 0.9 % (FLUSH) 0.9 %
10 SYRINGE (ML) INJECTION PRN
Status: DISCONTINUED | OUTPATIENT
Start: 2018-12-14 | End: 2018-12-14 | Stop reason: HOSPADM

## 2018-12-14 RX ORDER — ONDANSETRON 2 MG/ML
4 INJECTION INTRAMUSCULAR; INTRAVENOUS EVERY 6 HOURS PRN
Status: DISCONTINUED | OUTPATIENT
Start: 2018-12-14 | End: 2018-12-14 | Stop reason: HOSPADM

## 2018-12-14 RX ORDER — MORPHINE SULFATE 2 MG/ML
1 INJECTION, SOLUTION INTRAMUSCULAR; INTRAVENOUS EVERY 5 MIN PRN
Status: DISCONTINUED | OUTPATIENT
Start: 2018-12-14 | End: 2018-12-14 | Stop reason: HOSPADM

## 2018-12-14 RX ORDER — ONDANSETRON 2 MG/ML
4 INJECTION INTRAMUSCULAR; INTRAVENOUS
Status: DISCONTINUED | OUTPATIENT
Start: 2018-12-14 | End: 2018-12-14 | Stop reason: HOSPADM

## 2018-12-14 RX ORDER — HYDROCODONE BITARTRATE AND ACETAMINOPHEN 5; 325 MG/1; MG/1
1 TABLET ORAL EVERY 4 HOURS PRN
Status: DISCONTINUED | OUTPATIENT
Start: 2018-12-14 | End: 2018-12-14 | Stop reason: HOSPADM

## 2018-12-14 RX ORDER — ONDANSETRON 2 MG/ML
4 INJECTION INTRAMUSCULAR; INTRAVENOUS ONCE
Status: COMPLETED | OUTPATIENT
Start: 2018-12-14 | End: 2018-12-14

## 2018-12-14 RX ORDER — ONDANSETRON 2 MG/ML
INJECTION INTRAMUSCULAR; INTRAVENOUS PRN
Status: DISCONTINUED | OUTPATIENT
Start: 2018-12-14 | End: 2018-12-14 | Stop reason: SDUPTHER

## 2018-12-14 RX ADMIN — FENTANYL CITRATE 50 MCG: 50 INJECTION, SOLUTION INTRAMUSCULAR; INTRAVENOUS at 12:35

## 2018-12-14 RX ADMIN — HYDROCODONE BITARTRATE AND ACETAMINOPHEN 2 TABLET: 5; 325 TABLET ORAL at 14:21

## 2018-12-14 RX ADMIN — PROPOFOL 200 MG: 10 INJECTION, EMULSION INTRAVENOUS at 11:44

## 2018-12-14 RX ADMIN — KETOROLAC TROMETHAMINE 15 MG: 30 INJECTION, SOLUTION INTRAMUSCULAR at 02:07

## 2018-12-14 RX ADMIN — ONDANSETRON 4 MG: 2 INJECTION INTRAMUSCULAR; INTRAVENOUS at 12:14

## 2018-12-14 RX ADMIN — LIDOCAINE HYDROCHLORIDE 100 MG: 20 INJECTION, SOLUTION INFILTRATION; PERINEURAL at 11:42

## 2018-12-14 RX ADMIN — FENTANYL CITRATE 100 MCG: 50 INJECTION INTRAMUSCULAR; INTRAVENOUS at 11:42

## 2018-12-14 RX ADMIN — SODIUM CHLORIDE, POTASSIUM CHLORIDE, SODIUM LACTATE AND CALCIUM CHLORIDE: 600; 310; 30; 20 INJECTION, SOLUTION INTRAVENOUS at 05:31

## 2018-12-14 RX ADMIN — MIDAZOLAM 2 MG: 1 INJECTION INTRAMUSCULAR; INTRAVENOUS at 11:31

## 2018-12-14 RX ADMIN — CIPROFLOXACIN 400 MG: 2 INJECTION, SOLUTION INTRAVENOUS at 05:32

## 2018-12-14 RX ADMIN — FENTANYL CITRATE 50 MCG: 50 INJECTION, SOLUTION INTRAMUSCULAR; INTRAVENOUS at 13:08

## 2018-12-14 RX ADMIN — ROCURONIUM BROMIDE 30 MG: 10 INJECTION INTRAVENOUS at 11:44

## 2018-12-14 RX ADMIN — HYDROMORPHONE HYDROCHLORIDE 0.5 MG: 1 INJECTION, SOLUTION INTRAMUSCULAR; INTRAVENOUS; SUBCUTANEOUS at 05:39

## 2018-12-14 RX ADMIN — FENTANYL CITRATE 50 MCG: 50 INJECTION, SOLUTION INTRAMUSCULAR; INTRAVENOUS at 12:55

## 2018-12-14 RX ADMIN — MEPERIDINE HYDROCHLORIDE 12.5 MG: 25 INJECTION INTRAMUSCULAR; INTRAVENOUS; SUBCUTANEOUS at 13:00

## 2018-12-14 RX ADMIN — HYDROMORPHONE HYDROCHLORIDE 0.5 MG: 2 INJECTION INTRAMUSCULAR; INTRAVENOUS; SUBCUTANEOUS at 12:14

## 2018-12-14 RX ADMIN — MAGNESIUM HYDROXIDE 30 ML: 400 SUSPENSION ORAL at 14:21

## 2018-12-14 RX ADMIN — ONDANSETRON 4 MG: 2 INJECTION INTRAMUSCULAR; INTRAVENOUS at 02:07

## 2018-12-14 RX ADMIN — SUGAMMADEX 200 MG: 100 INJECTION, SOLUTION INTRAVENOUS at 12:14

## 2018-12-14 RX ADMIN — IOPAMIDOL 75 ML: 755 INJECTION, SOLUTION INTRAVENOUS at 02:42

## 2018-12-14 RX ADMIN — METRONIDAZOLE 500 MG: 500 INJECTION, SOLUTION INTRAVENOUS at 04:33

## 2018-12-14 RX ADMIN — SODIUM CHLORIDE 1000 ML: 9 INJECTION, SOLUTION INTRAVENOUS at 02:07

## 2018-12-14 RX ADMIN — HYDROMORPHONE HYDROCHLORIDE 0.5 MG: 2 INJECTION INTRAMUSCULAR; INTRAVENOUS; SUBCUTANEOUS at 11:51

## 2018-12-14 RX ADMIN — FENTANYL CITRATE 50 MCG: 50 INJECTION, SOLUTION INTRAMUSCULAR; INTRAVENOUS at 12:43

## 2018-12-14 ASSESSMENT — PULMONARY FUNCTION TESTS
PIF_VALUE: 13
PIF_VALUE: 0
PIF_VALUE: 3
PIF_VALUE: 1
PIF_VALUE: 31
PIF_VALUE: 7
PIF_VALUE: 0
PIF_VALUE: 0
PIF_VALUE: 34
PIF_VALUE: 24
PIF_VALUE: 34
PIF_VALUE: 24
PIF_VALUE: 0
PIF_VALUE: 32
PIF_VALUE: 24
PIF_VALUE: 5
PIF_VALUE: 10
PIF_VALUE: 25
PIF_VALUE: 24
PIF_VALUE: 17
PIF_VALUE: 1
PIF_VALUE: 0
PIF_VALUE: 5
PIF_VALUE: 5
PIF_VALUE: 29
PIF_VALUE: 23
PIF_VALUE: 0
PIF_VALUE: 9
PIF_VALUE: 0
PIF_VALUE: 24
PIF_VALUE: 37
PIF_VALUE: 37
PIF_VALUE: 24
PIF_VALUE: 23
PIF_VALUE: 0
PIF_VALUE: 9
PIF_VALUE: 0
PIF_VALUE: 29
PIF_VALUE: 37
PIF_VALUE: 35
PIF_VALUE: 25
PIF_VALUE: 0
PIF_VALUE: 1
PIF_VALUE: 37
PIF_VALUE: 24
PIF_VALUE: 3
PIF_VALUE: 25
PIF_VALUE: 5
PIF_VALUE: 5

## 2018-12-14 ASSESSMENT — PAIN SCALES - GENERAL
PAINLEVEL_OUTOF10: 7
PAINLEVEL_OUTOF10: 8
PAINLEVEL_OUTOF10: 8
PAINLEVEL_OUTOF10: 10
PAINLEVEL_OUTOF10: 8
PAINLEVEL_OUTOF10: 10
PAINLEVEL_OUTOF10: 8
PAINLEVEL_OUTOF10: 10
PAINLEVEL_OUTOF10: 3

## 2018-12-14 ASSESSMENT — PAIN - FUNCTIONAL ASSESSMENT: PAIN_FUNCTIONAL_ASSESSMENT: 0-10

## 2018-12-14 ASSESSMENT — PAIN DESCRIPTION - PAIN TYPE: TYPE: ACUTE PAIN

## 2018-12-14 ASSESSMENT — LIFESTYLE VARIABLES: SMOKING_STATUS: 0

## 2018-12-14 ASSESSMENT — ENCOUNTER SYMPTOMS: SHORTNESS OF BREATH: 0

## 2018-12-14 ASSESSMENT — PAIN DESCRIPTION - LOCATION: LOCATION: ABDOMEN

## 2018-12-14 NOTE — ANESTHESIA PRE PROCEDURE
Department of Anesthesiology  Preprocedure Note       Name:  Vicente Angulo   Age:  35 y.o.  :  1985                                          MRN:  [id number]         Date:  2018      Surgeon: Zohreh Silva):  Pradip Johnson MD    Procedure: LAPAROSCOPIC APPENDECTOMY, possible open (N/A Abdomen)    Medications prior to admission:   Prior to Admission medications    Medication Sig Start Date End Date Taking? Authorizing Provider   Insulin Pen Needle (B-D UF III MINI PEN NEEDLES) 31G X 5 MM MISC Inject insulin under the skin 4 times a day  AIC 12.6 18   Soto Moulton MD   tiZANidine (ZANAFLEX) 6 MG capsule Take 6 mg by mouth 2 times daily    Historical Provider, MD   blood glucose monitor strips Test 3 times a day & as needed for symptoms of irregular blood glucose. AIC 12.6 18   Soto Moulton MD   insulin aspart (NOVOLOG FLEXPEN) 100 UNIT/ML injection pen Inject 5 units with each meal 18   Soto Moulton MD   insulin glargine (LANTUS SOLOSTAR) 100 UNIT/ML injection pen Inject 10 units under skin nightly 18   Soto Moulton MD   metFORMIN (GLUCOPHAGE XR) 500 MG extended release tablet Take 1 tablet by mouth daily (with breakfast) for 14 days 18  Soto Moulton MD   glipiZIDE (GLUCOTROL) 5 MG tablet Take 1 tablet by mouth 2 times daily (before meals) for 10 days 18  Soto Moulton MD   lisinopril-hydrochlorothiazide COLLAZO D [facility]) 20-25 MG per tablet Take 1 tablet by mouth daily 18   Soto Moulton MD   amLODIPine (NORVASC) 5 MG tablet Take 1 tablet by mouth daily 18   Soto Moulton MD   DULoxetine (CYMBALTA) 30 MG extended release capsule Take 1 capsule by mouth 2 times daily 18   Soto Moulton MD   tiZANidine (ZANAFLEX) 4 MG tablet One tab TID prn 18   Soto Moulton MD   gabapentin (NEURONTIN) 300 MG capsule Take 1 capsule by mouth 2 times daily for 180 days. Intended supply: 30 days.  18  Soto Moulton MD injection 1 mg  1 mg Intramuscular PRN Jesus Lamas MD        dextrose 5 % solution  100 mL/hr Intravenous PRN Jesus Lamas MD           Allergies:     Allergies   Allergen Reactions    Augmentin [Amoxicillin-Pot Clavulanate] Rash       Problem List:    Patient Active Problem List   Diagnosis Code    Hypertension I10    Type II or unspecified type diabetes mellitus without mention of complication, not stated as uncontrolled E11.9    Asthma J45.909    Morbid obesity ([facility] E66.01    Appendicitis, acute K35.80       Past Medical History:        Diagnosis Date    Asthma     Hypertension     Miscarriage     Morbid obesity ([facility]     Type II or unspecified type diabetes mellitus without mention of complication, not stated as uncontrolled     Unspecified noninflammatory disorder of vulva and perineum        Past Surgical History:        Procedure Laterality Date    CHOLECYSTECTOMY, LAPAROSCOPIC  2/07    OTHER SURGICAL HISTORY  07/18/2014    COLPOSCOPY OF VULVA, VAGINA AND CERVIX WITH CO-2 LASER       Social History:    Social History   Substance Use Topics    Smoking status: Never Smoker    Smokeless tobacco: Never Used    Alcohol use No                                Counseling given: Not Answered      Vital Signs (Current):   Vitals:    12/14/18 0426 12/14/18 0617 12/14/18 0946 12/14/18 1119   BP: (!) 160/116 (!) 154/121  (!) 167/114   Pulse: 101 112  98   Resp: 16 18 18   Temp: 98.2 °F (36.8 °C) 97.4 °F (36.3 °C)  97.8 °F (36.6 °C)   TempSrc:  Axillary     SpO2: 91% 100% 99% 99%   Weight:       Height:                                                  BP Readings from Last 3 Encounters:   12/14/18 (!) 167/114   11/20/18 (!) 198/116   11/03/18 (!) 159/123       NPO Status: Time of last liquid consumption: 2356                        Time of last solid consumption: 2356                        Date of last liquid consumption: 12/13/18                        Date of last solid food consumption: 12/13/18    BMI:   Wt Readings from Last 3 Encounters:   12/14/18 293 lb 6.9 oz (133.1 kg)   11/20/18 283 lb (128.4 kg)   12/27/17 270 lb (122.5 kg)     Body mass index is 45.96 kg/m².     CBC:   Lab Results   Component Value Date    WBC 4.9 12/14/2018    RBC 4.48 12/14/2018    HGB 12.7 12/14/2018    HCT 38.8 12/14/2018    MCV 86.6 12/14/2018    RDW 13.2 12/14/2018     12/14/2018       CMP:   Lab Results   Component Value Date     12/14/2018    K 4.1 12/14/2018    CL 98 12/14/2018    CO2 25 12/14/2018    BUN 15 12/14/2018    CREATININE 0.6 12/14/2018    GFRAA >60 12/14/2018    GFRAA >60 02/28/2013    AGRATIO 1.1 12/14/2018    LABGLOM >60 12/14/2018    GLUCOSE 325 12/14/2018    PROT 7.8 12/14/2018    PROT 7.2 02/28/2013    CALCIUM 9.7 12/14/2018    BILITOT 0.3 12/14/2018    ALKPHOS 84 12/14/2018    AST 9 12/14/2018    ALT 9 12/14/2018       POC Tests:   Recent Labs      12/14/18   0738   POCGLU  194*       Coags: No results found for: PROTIME, INR, APTT    HCG (If Applicable):   Lab Results   Component Value Date    PREGTESTUR Negative 12/14/2018        ABGs: No results found for: PHART, PO2ART, YPL9ERC, TBJ2DRC, BEART, G7KTNXIY     Type & Screen (If Applicable):  No results found for: [facility]    Anesthesia Evaluation  Patient summary reviewed and Nursing notes reviewed no history of anesthetic complications:   Airway: Mallampati: III  TM distance: >3 FB   Neck ROM: full  Mouth opening: > = 3 FB Dental:          Pulmonary:normal exam    (+) asthma:     (-) pneumonia, COPD, shortness of breath, recent URI, sleep apnea and not a current smoker                           Cardiovascular:  Exercise tolerance: good (>4 METS),   (+) hypertension:,     (-) pacemaker, valvular problems/murmurs, past MI, CAD, CABG/stent, dysrhythmias,  angina,  CHF, orthopnea, PND and  ESCALERA                Neuro/Psych:   Negative Neuro/Psych ROS              GI/Hepatic/Renal:   (+) morbid obesity     (-) hiatal hernia, GERD,

## 2018-12-14 NOTE — PROGRESS NOTES
Patient arrived to room 4281. Patient is alert and oriented in bed. VSS. Call light within reach of patient. Will continue to monitor.

## 2018-12-14 NOTE — ED PROVIDER NOTES
Contrast? None    Result Date: 12/14/2018  EXAMINATION: CT OF THE ABDOMEN AND PELVIS WITH CONTRAST 12/14/2018 2:46 am TECHNIQUE: CT of the abdomen and pelvis was performed with the administration of intravenous contrast. Multiplanar reformatted images are provided for review. Dose modulation, iterative reconstruction, and/or weight based adjustment of the mA/kV was utilized to reduce the radiation dose to as low as reasonably achievable. COMPARISON: None. HISTORY: ORDERING SYSTEM PROVIDED HISTORY: RLQ pain TECHNOLOGIST PROVIDED HISTORY: Additional Contrast?->None Ordering Physician Provided Reason for Exam: rlq pain Acuity: Acute Type of Exam: Initial Relevant Medical/Surgical History: hx hypertension, diabetes, aimee FINDINGS: Lower Chest: Normal. Liver: Normal. Gallbladder and Bile Ducts: Prior cholecystectomy. Spleen: Normal. Adrenal Glands: Normal. Pancreas: Diffuse pancreatic atrophy. Genitourinary: Normal. Bowel: No bowel obstruction. Large amount of colonic stool. Prominent mildly inflamed appendix measures 1.3 cm in diameter. No free air or abscess. Vasculature: Normal. Bones and Soft Tissues: Osseous sclerosis in the left greater than right iliac bones adjacent to the sacroiliac joints suggests osteitis condensans ilii. Retroperitoneum/Mesentery: No intraperitoneal free air, ascites or fluid collection. No lymphadenopathy in the abdomen or pelvis. Prominent and mildly inflamed appendix suggests acute appendicitis. No free air or abscess. Procedures/EKG:   Procedures    ED Course and MDM           In Jonathan Wiggins is a 35 y.o. female who presented to the emergency department For chief complaint of right lower quadrant abdominal pain. Patient a history and physical exam concerning for possible appendicitis that she did have some right lower quadrant guarding and rebound. Vital signs were stable outside of some mildly elevated. Heart rate but no hypotension or fever.   She had no elevated white blood count, however CT scan does show acute appendicitis, and given her current exam I do believe this is likely the cause of her symptoms. I did discuss the case with the on-call surgeon, and he recommended surgery the patient on Cipro and Flagyl and that she'll likely go to the OR later this morning. I discussed this with the patient, and while she is anxious about the surgery overall she understands and agrees the plan at this time. ED Medication Orders     Start Ordered     Status Ordering Provider    12/14/18 0235 12/14/18 0235  iopamidol (ISOVUE-370) 76 % injection 75 mL  IMG ONCE PRN      Last MAR action:  Given - by Sheila Jolley on 12/14/18 at [facility], [place]    12/14/18 0145 12/14/18 0135  0.9 % sodium chloride bolus  ONCE      Last MAR action:  New Bag - by Guerrero Gonsales on 12/14/18 at [address], [place]    12/14/18 0145 12/14/18 0135  ondansetron (ZOFRAN) injection 4 mg  ONCE      Last MAR action:  Given - by Guerrero Gonsales on 12/14/18 at [address], [place]    12/14/18 0145 12/14/18 0135  ketorolac (TORADOL) injection 15 mg  ONCE      Last MAR action:  Given - by Guerrero Gonsales on 12/14/18 at [address], [place]          Final Impression      1.  Acute appendicitis with generalized peritonitis, without gangrene or abscess, unspecified whether perforation present      DISPOSITION    (Please note that portions of this note may have been completed with a voice recognition program. Efforts were made to edit thedictations but occasionally words are mis-transcribed.)    Juan Ramon Chan, [address], DO  12/14/18 8533

## 2018-12-14 NOTE — PROGRESS NOTES
Patient alert and oriented. Returned from surgery stating that she still needed assistance with her bowels. Milk of magnesia administered. No bowel movements noted at this time. Patient states that she will resume her insulin regimen at home. No refills for zanaflex per patient. Requests for a refill sent to [facility] MD. Abdominal binder given. On-call states that the prescription will not be refilled until 12.17.2018. Patient assisted with activities of daily living. Monitor patient progress.

## 2018-12-14 NOTE — ANESTHESIA POSTPROCEDURE EVALUATION
Department of Anesthesiology  Postprocedure Note    Patient: Sean Waddell  MRN: [id number]  YOB: 1985  Date of evaluation: 12/14/2018  Time:  1:44 PM     Procedure Summary     Date:  12/14/18 Room / Location:  [facility] OR  / [facility] OR    Anesthesia Start:  9378 Anesthesia Stop:  9371    Procedure:  LAPAROSCOPIC APPENDECTOMY (N/A Abdomen) Diagnosis:  (belly pain)    Surgeon:  Joce Odonnell MD Responsible Provider:  Tatum Lucero MD    Anesthesia Type:  general ASA Status:  3          Anesthesia Type: general    Amira Phase I: Amira Score: 10    Amira Phase II:      Last vitals: Reviewed and per EMR flowsheets.        Anesthesia Post Evaluation    Patient location during evaluation: PACU  Patient participation: complete - patient participated  Level of consciousness: awake and alert  Pain score: 0  Airway patency: patent  Nausea & Vomiting: no nausea and no vomiting  Complications: no  Cardiovascular status: blood pressure returned to baseline  Respiratory status: acceptable  Hydration status: euvolemic

## 2018-12-14 NOTE — PROGRESS NOTES
Nicolettevers resolving, pt states wants to go to her room, wants to see her mom and eat, states still hursts but want to go to her room, calmer, shifting/repositioning self in bed self in bed

## 2018-12-14 NOTE — ED TRIAGE NOTES
Pt to ED with pain in right flank. Pt states she has had n/v times 2 today. Pt states she has not had a BM in 4 days. Pt states she has been diaphoretic the last few days.

## 2018-12-14 NOTE — OP NOTE
Laparoscopic Appendectomy Operative Report      Patient: Charley Coleman MRN: [id number]     YOB: 1985  Age: 35 y.o. Sex: female        Primary Care Physician: Milagros Hernandez MD         DATE OF OPERATION: 12/14/2018     PREOPERATIVE DIAGNOSIS: acute appendicitis    POSTOPERATIVE DIAGNOSIS: acute appendicitis - nonperforated    PROCEDURE PERFORMED: Laparoscopic appendectomy     SURGEON: Rasheed Kauffman MD    ANESTHESIA: GETA with local anesthetic. ASA CLASS: 3    ANTIBIOTICS: Cipro and flagyl IV. DVT PROPHYLAXIS: Bilateral pneumatic compression boots. INDICATIONS:  The patient came to the emergency department with acute onset of generalized abdominal pain that localized to the right lower quadrant. History, physical exam and CAT scan confirmed acute appendicitis. As a result, the risks, benefits, and alternatives of appendectomy were discussed at length including bleeding, infection, injury to other organs and conversion to open. All questions were answered and the patient was agreeable to proceed. PROCEDURE:   The patient was brought to the operating suite, placed in a supine position on the operating room table. General anesthesia was induced which he tolerated well. The abdomen was then clipped free of hair and then prepped and draped in the usual sterile fashion and a timeout was performed. Pneumoperitoneum was established via Veress needle through an infraumbilical Incision. A 5 mm trocar was then inserted and the laparoscope followed. No injury from Veress needle placement was encountered. As a result, additional 5 mm port was placed in the suprapubic area and another 10 mm port in the left lower quadrant. We turned our attention then back to the right lower quadrant and there was  some inflammation noted. The appendix itself was identified and noted to be inflamed in mid body but intact. Using a LigaSure we took the mesoappendix down to a healthy base of the appendix.  The

## 2018-12-17 ENCOUNTER — TELEPHONE (OUTPATIENT)
Dept: PRIMARY CARE CLINIC | Age: 33
End: 2018-12-17

## 2018-12-17 RX ORDER — AMLODIPINE BESYLATE 5 MG/1
5 TABLET ORAL DAILY
Qty: 30 TABLET | Refills: 2 | Status: SHIPPED | OUTPATIENT
Start: 2018-12-17 | End: 2018-12-19 | Stop reason: DRUGHIGH

## 2018-12-19 ENCOUNTER — OFFICE VISIT (OUTPATIENT)
Dept: PRIMARY CARE CLINIC | Age: 33
End: 2018-12-19
Payer: COMMERCIAL

## 2018-12-19 VITALS
HEART RATE: 125 BPM | BODY MASS INDEX: 44.89 KG/M2 | DIASTOLIC BLOOD PRESSURE: 106 MMHG | SYSTOLIC BLOOD PRESSURE: 150 MMHG | TEMPERATURE: 97.2 F | OXYGEN SATURATION: 98 % | WEIGHT: 286 LBS | HEIGHT: 67 IN

## 2018-12-19 DIAGNOSIS — G89.29 CHRONIC PAIN OF RIGHT KNEE: ICD-10-CM

## 2018-12-19 DIAGNOSIS — R10.30 LOWER ABDOMINAL PAIN: Primary | ICD-10-CM

## 2018-12-19 DIAGNOSIS — I10 ESSENTIAL HYPERTENSION: ICD-10-CM

## 2018-12-19 DIAGNOSIS — Z90.49 S/P APPENDECTOMY: ICD-10-CM

## 2018-12-19 DIAGNOSIS — M25.561 CHRONIC PAIN OF RIGHT KNEE: ICD-10-CM

## 2018-12-19 PROCEDURE — 1111F DSCHRG MED/CURRENT MED MERGE: CPT | Performed by: FAMILY MEDICINE

## 2018-12-19 PROCEDURE — 99496 TRANSJ CARE MGMT HIGH F2F 7D: CPT | Performed by: FAMILY MEDICINE

## 2018-12-19 RX ORDER — AMLODIPINE BESYLATE 10 MG/1
TABLET ORAL
Qty: 30 TABLET | Refills: 3 | Status: SHIPPED | OUTPATIENT
Start: 2018-12-19 | End: 2019-11-19 | Stop reason: SDUPTHER

## 2018-12-19 RX ORDER — TRAMADOL HYDROCHLORIDE 50 MG/1
50 TABLET ORAL EVERY 6 HOURS PRN
Qty: 12 TABLET | Refills: 0 | Status: SHIPPED | OUTPATIENT
Start: 2018-12-19 | End: 2018-12-22

## 2018-12-19 RX ORDER — TIZANIDINE 4 MG/1
TABLET ORAL
Qty: 30 TABLET | Refills: 0 | Status: SHIPPED | OUTPATIENT
Start: 2018-12-19 | End: 2019-01-04 | Stop reason: SDUPTHER

## 2018-12-19 NOTE — PROGRESS NOTES
Post-Discharge Transitional Care Management Services or Hospital Follow Up      Charley Coleman   YOB: 1985    Date of Office Visit:  12/19/2018  Date of Hospital Admission: 12/14/18  Date of Hospital Discharge: 12/14/18  Risk of hospital readmission (high >=14%. Medium >=10%) :Readmission Risk Score: 7    Care management risk score Rising risk (score 2-5) and Complex Care (Scores >=6): 6     Non face to face  following discharge, date last encounter closed (first attempt may have been earlier): 12/17/2018  9:08 AM    Call initiated 2 business days of discharge: Yes    Patient Active Problem List   Diagnosis    Hypertension    Type II or unspecified type diabetes mellitus without mention of complication, not stated as uncontrolled    Asthma    Morbid obesity ([facility] [address]    Appendicitis, acute    Acute appendicitis       Allergies   Allergen Reactions    Augmentin [Amoxicillin-Pot Clavulanate] Rash       Medications listed as ordered at the time of discharge from hospital   Eliseo Charline, [address] [facility] Medication Instructions REBECCA:    Printed on:12/19/18 1038   Medication Information                      acetaminophen (APAP EXTRA STRENGTH) 500 MG tablet  One tab 4 times             Alcohol Swabs (ALCOHOL PREP) PADS  Use daily             amLODIPine (NORVASC) 5 MG tablet  Take 1 tablet by mouth daily             blood glucose monitor kit and supplies  Test 3 times a day & as needed for symptoms of irregular blood glucose. AIC is 12.6  Dispense any monitor compatible with test strips and covered by insurance             blood glucose monitor strips  Test 3 times a day & as needed for symptoms of irregular blood glucose. AIC 12.6             DULoxetine (CYMBALTA) 30 MG extended release capsule  Take 1 capsule by mouth 2 times daily             gabapentin (NEURONTIN) 300 MG capsule  Take 1 capsule by mouth 2 times daily for 180 days. Intended supply: 30 days.              glipiZIDE (GLUCOTROL) 5 MG tablet  Take & left lower abdomen well healed  Extremities: no cyanosis, clubbing or edema  Musculoskeletal: normal range of motion, no joint swelling, deformity or tenderness  Neurologic: reflexes normal and symmetric, no cranial nerve deficit, gait, coordination and speech normal    Assessment/Plan:  1. Lower abdominal pain  Benign abdomen  Has fu with surgeon Dr Lynette Peña MD  - traMADol (ULTRAM) 50 MG tablet; Take 1 tablet by mouth every 6 hours as needed for Pain for up to 3 days. Intended supply: 3 days. Take lowest dose possible to manage pain. Dispense: 12 tablet; Refill: 0    2. S/P appendectomy  See one above    3. Essential hypertension  bp is up  Goal <130/80  Cont. Diuretic/arb combo  Increase dose of calcium channel blocker  - amLODIPine (NORVASC) 10 MG tablet; Take one tab a day and stop amlodipine 5 mg  Dispense: 30 tablet;  Refill: 3  Recent renal          Medical Decision Making: moderate complexity

## 2019-01-03 ENCOUNTER — TELEPHONE (OUTPATIENT)
Dept: PRIMARY CARE CLINIC | Age: 34
End: 2019-01-03

## 2019-01-04 DIAGNOSIS — G89.29 CHRONIC PAIN OF RIGHT KNEE: ICD-10-CM

## 2019-01-04 DIAGNOSIS — M25.561 CHRONIC PAIN OF RIGHT KNEE: ICD-10-CM

## 2019-01-04 RX ORDER — TIZANIDINE 4 MG/1
TABLET ORAL
Qty: 90 TABLET | Refills: 3 | Status: SHIPPED | OUTPATIENT
Start: 2019-01-04 | End: 2019-05-13 | Stop reason: SDUPTHER

## 2019-02-05 DIAGNOSIS — I10 HYPERTENSION, UNSPECIFIED TYPE: ICD-10-CM

## 2019-02-06 RX ORDER — GLIPIZIDE 5 MG/1
5 TABLET ORAL
Qty: 60 TABLET | Refills: 3 | Status: SHIPPED | OUTPATIENT
Start: 2019-02-06 | End: 2019-05-14 | Stop reason: SDUPTHER

## 2019-02-06 RX ORDER — METFORMIN HYDROCHLORIDE 500 MG/1
500 TABLET, EXTENDED RELEASE ORAL
Qty: 30 TABLET | Refills: 3 | Status: SHIPPED | OUTPATIENT
Start: 2019-02-06 | End: 2019-05-14 | Stop reason: SDUPTHER

## 2019-05-13 DIAGNOSIS — G89.29 CHRONIC PAIN OF RIGHT KNEE: ICD-10-CM

## 2019-05-13 DIAGNOSIS — M25.561 CHRONIC PAIN OF RIGHT KNEE: ICD-10-CM

## 2019-05-13 DIAGNOSIS — I10 HYPERTENSION, UNSPECIFIED TYPE: ICD-10-CM

## 2019-05-14 RX ORDER — GLIPIZIDE 5 MG/1
TABLET ORAL
Qty: 60 TABLET | Refills: 0 | Status: SHIPPED | OUTPATIENT
Start: 2019-05-14 | End: 2019-05-15 | Stop reason: SDUPTHER

## 2019-05-14 RX ORDER — TIZANIDINE 4 MG/1
TABLET ORAL
Qty: 30 TABLET | Refills: 0 | Status: SHIPPED | OUTPATIENT
Start: 2019-05-14 | End: 2019-06-20 | Stop reason: SDUPTHER

## 2019-05-14 RX ORDER — METFORMIN HYDROCHLORIDE 500 MG/1
TABLET, EXTENDED RELEASE ORAL
Qty: 30 TABLET | Refills: 0 | Status: SHIPPED | OUTPATIENT
Start: 2019-05-14 | End: 2019-06-20 | Stop reason: SDUPTHER

## 2019-05-15 ENCOUNTER — TELEPHONE (OUTPATIENT)
Dept: PRIMARY CARE CLINIC | Age: 34
End: 2019-05-15

## 2019-05-15 DIAGNOSIS — I10 HYPERTENSION, UNSPECIFIED TYPE: ICD-10-CM

## 2019-05-15 RX ORDER — GLIPIZIDE 5 MG/1
TABLET ORAL
Qty: 60 TABLET | Refills: 2 | Status: SHIPPED | OUTPATIENT
Start: 2019-05-15 | End: 2019-11-19 | Stop reason: SDUPTHER

## 2019-05-15 NOTE — TELEPHONE ENCOUNTER
Serjio on [place] said script came over for glipizide which states for 10 days but qty was 30 day supply. Please clarify.      Brennen Alvarez PHONE:   [phone]

## 2019-06-20 DIAGNOSIS — I10 HYPERTENSION, UNSPECIFIED TYPE: ICD-10-CM

## 2019-06-20 DIAGNOSIS — G89.29 CHRONIC PAIN OF RIGHT KNEE: ICD-10-CM

## 2019-06-20 DIAGNOSIS — M25.561 CHRONIC PAIN OF RIGHT KNEE: ICD-10-CM

## 2019-06-22 RX ORDER — TIZANIDINE 4 MG/1
TABLET ORAL
Qty: 30 TABLET | Refills: 0 | Status: SHIPPED | OUTPATIENT
Start: 2019-06-22 | End: 2020-01-11

## 2019-06-22 RX ORDER — METFORMIN HYDROCHLORIDE 500 MG/1
TABLET, EXTENDED RELEASE ORAL
Qty: 30 TABLET | Refills: 0 | Status: SHIPPED | OUTPATIENT
Start: 2019-06-22 | End: 2019-11-19 | Stop reason: SDUPTHER

## 2019-11-19 ENCOUNTER — OFFICE VISIT (OUTPATIENT)
Dept: PRIMARY CARE CLINIC | Age: 34
End: 2019-11-19
Payer: COMMERCIAL

## 2019-11-19 VITALS
HEART RATE: 104 BPM | HEIGHT: 67 IN | BODY MASS INDEX: 45.74 KG/M2 | WEIGHT: 291.4 LBS | SYSTOLIC BLOOD PRESSURE: 160 MMHG | DIASTOLIC BLOOD PRESSURE: 100 MMHG | OXYGEN SATURATION: 99 %

## 2019-11-19 DIAGNOSIS — E66.01 MORBIDLY OBESE (HCC): ICD-10-CM

## 2019-11-19 DIAGNOSIS — Z12.4 CERVICAL CANCER SCREENING: ICD-10-CM

## 2019-11-19 DIAGNOSIS — I10 HYPERTENSION, UNSPECIFIED TYPE: ICD-10-CM

## 2019-11-19 PROCEDURE — G8484 FLU IMMUNIZE NO ADMIN: HCPCS | Performed by: FAMILY MEDICINE

## 2019-11-19 PROCEDURE — G8417 CALC BMI ABV UP PARAM F/U: HCPCS | Performed by: FAMILY MEDICINE

## 2019-11-19 PROCEDURE — 3046F HEMOGLOBIN A1C LEVEL >9.0%: CPT | Performed by: FAMILY MEDICINE

## 2019-11-19 PROCEDURE — 2022F DILAT RTA XM EVC RTNOPTHY: CPT | Performed by: FAMILY MEDICINE

## 2019-11-19 PROCEDURE — 1036F TOBACCO NON-USER: CPT | Performed by: FAMILY MEDICINE

## 2019-11-19 PROCEDURE — G8427 DOCREV CUR MEDS BY ELIG CLIN: HCPCS | Performed by: FAMILY MEDICINE

## 2019-11-19 PROCEDURE — 99214 OFFICE O/P EST MOD 30 MIN: CPT | Performed by: FAMILY MEDICINE

## 2019-11-19 RX ORDER — GABAPENTIN 300 MG/1
300 CAPSULE ORAL 2 TIMES DAILY
Qty: 60 CAPSULE | Refills: 5 | Status: SHIPPED | OUTPATIENT
Start: 2019-11-19 | End: 2021-01-04 | Stop reason: SDUPTHER

## 2019-11-19 RX ORDER — PEN NEEDLE, DIABETIC 31 GX5/16"
NEEDLE, DISPOSABLE MISCELLANEOUS
Qty: 100 EACH | Refills: 10 | Status: SHIPPED | OUTPATIENT
Start: 2019-11-19 | End: 2021-02-02

## 2019-11-19 RX ORDER — METFORMIN HYDROCHLORIDE 500 MG/1
TABLET, EXTENDED RELEASE ORAL
Qty: 30 TABLET | Refills: 0 | Status: SHIPPED | OUTPATIENT
Start: 2019-11-19 | End: 2019-11-20 | Stop reason: SDUPTHER

## 2019-11-19 RX ORDER — LANCETS 30 GAUGE
EACH MISCELLANEOUS
Qty: 100 EACH | Refills: 10 | Status: SHIPPED
Start: 2019-11-19 | End: 2020-05-21 | Stop reason: SDUPTHER

## 2019-11-19 RX ORDER — AMLODIPINE BESYLATE 10 MG/1
TABLET ORAL
Qty: 30 TABLET | Refills: 3 | Status: SHIPPED | OUTPATIENT
Start: 2019-11-19 | End: 2020-05-20

## 2019-11-19 RX ORDER — PEN NEEDLE, DIABETIC 31 GX5/16"
NEEDLE, DISPOSABLE MISCELLANEOUS
Qty: 100 EACH | Refills: 10 | Status: SHIPPED
Start: 2019-11-19 | End: 2020-05-20 | Stop reason: SDUPTHER

## 2019-11-19 RX ORDER — LOSARTAN POTASSIUM AND HYDROCHLOROTHIAZIDE 12.5; 5 MG/1; MG/1
TABLET ORAL
Qty: 30 TABLET | Refills: 5 | Status: SHIPPED | OUTPATIENT
Start: 2019-11-19 | End: 2020-05-20

## 2019-11-19 RX ORDER — GLUCOSAMINE HCL/CHONDROITIN SU 500-400 MG
CAPSULE ORAL
Qty: 100 STRIP | Refills: 11 | Status: SHIPPED | OUTPATIENT
Start: 2019-11-19 | End: 2021-02-02

## 2019-11-19 RX ORDER — GLUCOSAMINE HCL/CHONDROITIN SU 500-400 MG
CAPSULE ORAL
Qty: 100 STRIP | Refills: 12 | Status: SHIPPED | OUTPATIENT
Start: 2019-11-19 | End: 2021-02-02

## 2019-11-19 RX ORDER — DULOXETIN HYDROCHLORIDE 30 MG/1
30 CAPSULE, DELAYED RELEASE ORAL 2 TIMES DAILY
Qty: 30 CAPSULE | Refills: 2 | Status: SHIPPED
Start: 2019-11-19 | End: 2020-05-21 | Stop reason: SDUPTHER

## 2019-11-19 RX ORDER — GLIPIZIDE 5 MG/1
TABLET ORAL
Qty: 60 TABLET | Refills: 2 | Status: SHIPPED
Start: 2019-11-19 | End: 2020-05-20 | Stop reason: SINTOL

## 2019-11-19 ASSESSMENT — ENCOUNTER SYMPTOMS
SORE THROAT: 0
COUGH: 0
ORTHOPNEA: 0
NAUSEA: 0
DIARRHEA: 0
PHOTOPHOBIA: 0
BACK PAIN: 0
RHINORRHEA: 0
RECTAL PAIN: 0
BLOOD IN STOOL: 0
EYE ITCHING: 0
EYE DISCHARGE: 0
BLURRED VISION: 0
CONSTIPATION: 0
EYE REDNESS: 0
SHORTNESS OF BREATH: 0
SINUS PRESSURE: 0
COLOR CHANGE: 0
STRIDOR: 0
CHOKING: 0
TROUBLE SWALLOWING: 0
APNEA: 0
VOMITING: 0
CHEST TIGHTNESS: 0
EYE PAIN: 0
WHEEZING: 0
ABDOMINAL DISTENTION: 0

## 2019-11-19 ASSESSMENT — PATIENT HEALTH QUESTIONNAIRE - PHQ9
SUM OF ALL RESPONSES TO PHQ QUESTIONS 1-9: 0
SUM OF ALL RESPONSES TO PHQ QUESTIONS 1-9: 0
2. FEELING DOWN, DEPRESSED OR HOPELESS: 0
SUM OF ALL RESPONSES TO PHQ9 QUESTIONS 1 & 2: 0
1. LITTLE INTEREST OR PLEASURE IN DOING THINGS: 0

## 2019-11-20 ENCOUNTER — TELEPHONE (OUTPATIENT)
Dept: PRIMARY CARE CLINIC | Age: 34
End: 2019-11-20

## 2019-11-20 DIAGNOSIS — I10 HYPERTENSION, UNSPECIFIED TYPE: ICD-10-CM

## 2019-11-20 RX ORDER — METFORMIN HYDROCHLORIDE 500 MG/1
TABLET, EXTENDED RELEASE ORAL
Qty: 30 TABLET | Refills: 3 | Status: SHIPPED | OUTPATIENT
Start: 2019-11-20 | End: 2020-05-20

## 2019-11-22 ENCOUNTER — TELEPHONE (OUTPATIENT)
Dept: PRIMARY CARE CLINIC | Age: 34
End: 2019-11-22

## 2019-11-22 RX ORDER — LOSARTAN POTASSIUM 50 MG/1
50 TABLET ORAL DAILY
Qty: 30 TABLET | Refills: 5 | Status: SHIPPED | OUTPATIENT
Start: 2019-11-22 | End: 2020-05-20

## 2019-11-22 RX ORDER — HYDROCHLOROTHIAZIDE 12.5 MG/1
12.5 CAPSULE, GELATIN COATED ORAL DAILY
Qty: 30 CAPSULE | Refills: 5 | Status: SHIPPED | OUTPATIENT
Start: 2019-11-22 | End: 2020-05-20

## 2020-05-09 RX ORDER — TIZANIDINE 4 MG/1
TABLET ORAL
Qty: 30 TABLET | Refills: 0 | Status: SHIPPED
Start: 2020-05-09 | End: 2020-05-21 | Stop reason: SDUPTHER

## 2020-05-19 ENCOUNTER — NURSE TRIAGE (OUTPATIENT)
Dept: OTHER | Facility: CLINIC | Age: 35
End: 2020-05-19

## 2020-05-19 NOTE — TELEPHONE ENCOUNTER
She saw an ObGyn on the 5/5 but was referred to Maternal Fetal medicine because she is high risk. She was started on labetalol. Reason for Disposition   MILD-MODERATE headache and present > 72 hours    Answer Assessment - Initial Assessment Questions  1. LOCATION: \"Where does it hurt? \"       11 weeks pregnant. Saw Ob/Gyn 5/5 and was started on labatelol. Was referred to [facility], they can't see her until late June. She called there with her concerns but they deferred her back to her PCP. She does not have her BP or BG medication outside of what the Ob/Gyn started her on, needs refills and was calling to talk with her PCP. She feels like her BP is high and her BG is high. She can't get an appointment at a pregnancy clinic. Has questions about her insulin. 2. ONSET: \"When did the headache start? \" (Minutes, hours or days)       She has had a headache for weeks, it is mild. 3. PATTERN: \"Does the pain come and go, or has it been constant since it started? \"      constant  4. SEVERITY: \"How bad is the pain? \" and \"What does it keep you from doing? \"     - MILD - doesn't interfere with normal activities     - MODERATE - interferes with normal activities or awakens from sleep     - SEVERE - excruciating pain, unable to do any normal activities         Mild-moderate  5. RECURRENT SYMPTOM: \"Have you ever had headaches before? \" If so, ask: \"When was the last time? \" and \"What happened that time? \"       Yes   6. CAUSE: \"What do you think is causing the headache? \"      She believes her BG is high  7. MIGRAINE: \"Have you been diagnosed with migraine headaches? \" If so, ask: \"Is this headache similar? \"       no  8. HEAD INJURY: \"Has there been any recent injury to the head? \"       no  9. OTHER SYMPTOMS: \"Do you have any other symptoms? \" (e.g., fever, stiff neck, blurred vision; swelling of hands, face, or feet)      Frequent urination  10. PREGNANCY: \"How many weeks pregnant are you?\"        47  84.  RAQUEL: \"What date are you

## 2020-05-20 ENCOUNTER — TELEPHONE (OUTPATIENT)
Dept: PRIMARY CARE CLINIC | Age: 35
End: 2020-05-20

## 2020-05-21 RX ORDER — DULOXETIN HYDROCHLORIDE 20 MG/1
20 CAPSULE, DELAYED RELEASE ORAL DAILY
Qty: 30 CAPSULE | Refills: 3 | Status: SHIPPED | OUTPATIENT
Start: 2020-05-21 | End: 2020-10-12

## 2020-05-21 RX ORDER — PEN NEEDLE, DIABETIC 31 GX5/16"
NEEDLE, DISPOSABLE MISCELLANEOUS
Qty: 100 EACH | Refills: 10 | Status: SHIPPED | OUTPATIENT
Start: 2020-05-21 | End: 2021-02-02

## 2020-05-21 RX ORDER — INSULIN HUMAN 100 [IU]/ML
INJECTION, SUSPENSION SUBCUTANEOUS
Qty: 5 PEN | Refills: 3 | Status: SHIPPED | OUTPATIENT
Start: 2020-05-21 | End: 2020-12-30 | Stop reason: SDUPTHER

## 2020-05-21 RX ORDER — LANCETS 30 GAUGE
EACH MISCELLANEOUS
Qty: 100 EACH | Refills: 10 | Status: SHIPPED | OUTPATIENT
Start: 2020-05-21 | End: 2021-02-02

## 2020-05-21 RX ORDER — GLUCOSAMINE HCL/CHONDROITIN SU 500-400 MG
CAPSULE ORAL
Qty: 10 STRIP | Refills: 10 | Status: SHIPPED | OUTPATIENT
Start: 2020-05-21 | End: 2020-12-30 | Stop reason: SDUPTHER

## 2020-05-21 RX ORDER — PEN NEEDLE, DIABETIC 31 GX5/16"
NEEDLE, DISPOSABLE MISCELLANEOUS
Qty: 100 EACH | Refills: 5 | Status: SHIPPED | OUTPATIENT
Start: 2020-05-21 | End: 2020-12-30 | Stop reason: SDUPTHER

## 2020-05-21 RX ORDER — TIZANIDINE 4 MG/1
TABLET ORAL
Qty: 90 TABLET | Refills: 5 | Status: SHIPPED | OUTPATIENT
Start: 2020-05-21 | End: 2020-12-30 | Stop reason: SDUPTHER

## 2020-05-22 ENCOUNTER — TELEPHONE (OUTPATIENT)
Dept: PRIMARY CARE CLINIC | Age: 35
End: 2020-05-22

## 2020-05-22 RX ORDER — ONDANSETRON 4 MG/1
4 TABLET, FILM COATED ORAL EVERY 8 HOURS PRN
Qty: 20 TABLET | Refills: 2 | Status: SHIPPED | OUTPATIENT
Start: 2020-05-22 | End: 2020-12-30 | Stop reason: SDUPTHER

## 2020-06-14 ENCOUNTER — HOSPITAL ENCOUNTER (EMERGENCY)
Age: 35
Discharge: HOME | End: 2020-06-14
Attending: EMERGENCY MEDICINE

## 2020-06-14 VITALS
OXYGEN SATURATION: 98 % | HEIGHT: 67 IN | HEART RATE: 87 BPM | TEMPERATURE: 97.5 F | SYSTOLIC BLOOD PRESSURE: 126 MMHG | BODY MASS INDEX: 45.99 KG/M2 | WEIGHT: 293 LBS | DIASTOLIC BLOOD PRESSURE: 75 MMHG | RESPIRATION RATE: 11 BRPM

## 2020-06-14 LAB
A/G RATIO: 1 (ref 1.1–2.2)
ABO/RH: NORMAL
ABO/RH: NORMAL
ALBUMIN SERPL-MCNC: 3.3 G/DL (ref 3.4–5)
ALP BLD-CCNC: 73 U/L (ref 40–129)
ALT SERPL-CCNC: 9 U/L (ref 10–40)
ANION GAP SERPL CALCULATED.3IONS-SCNC: 16 MMOL/L (ref 3–16)
ANTIBODY SCREEN: NORMAL
AST SERPL-CCNC: 12 U/L (ref 15–37)
BASOPHILS ABSOLUTE: 0 K/UL (ref 0–0.2)
BASOPHILS RELATIVE PERCENT: 0.8 %
BILIRUB SERPL-MCNC: <0.2 MG/DL (ref 0–1)
BUN BLDV-MCNC: 12 MG/DL (ref 7–20)
CALCIUM SERPL-MCNC: 8.7 MG/DL (ref 8.3–10.6)
CHLORIDE BLD-SCNC: 98 MMOL/L (ref 99–110)
CO2: 17 MMOL/L (ref 21–32)
CREAT SERPL-MCNC: 0.5 MG/DL (ref 0.6–1.1)
EOSINOPHILS ABSOLUTE: 0.1 K/UL (ref 0–0.6)
EOSINOPHILS RELATIVE PERCENT: 2.1 %
GFR AFRICAN AMERICAN: >60
GFR NON-AFRICAN AMERICAN: >60
GLOBULIN: 3.2 G/DL
GLUCOSE BLD-MCNC: 329 MG/DL (ref 70–99)
GONADOTROPIN, CHORIONIC (HCG) QUANT: NORMAL MIU/ML
HCT VFR BLD CALC: 34.1 % (ref 36–48)
HEMOGLOBIN: 11.4 G/DL (ref 12–16)
LYMPHOCYTES ABSOLUTE: 2.4 K/UL (ref 1–5.1)
LYMPHOCYTES RELATIVE PERCENT: 41.1 %
MCH RBC QN AUTO: 28.8 PG (ref 26–34)
MCHC RBC AUTO-ENTMCNC: 33.6 G/DL (ref 31–36)
MCV RBC AUTO: 85.8 FL (ref 80–100)
MONOCYTES ABSOLUTE: 0.5 K/UL (ref 0–1.3)
MONOCYTES RELATIVE PERCENT: 7.8 %
NEUTROPHILS ABSOLUTE: 2.9 K/UL (ref 1.7–7.7)
NEUTROPHILS RELATIVE PERCENT: 48.2 %
PDW BLD-RTO: 13.8 % (ref 12.4–15.4)
PLATELET # BLD: 298 K/UL (ref 135–450)
PMV BLD AUTO: 7.5 FL (ref 5–10.5)
POTASSIUM REFLEX MAGNESIUM: 3.7 MMOL/L (ref 3.5–5.1)
RBC # BLD: 3.97 M/UL (ref 4–5.2)
SODIUM BLD-SCNC: 131 MMOL/L (ref 136–145)
TOTAL PROTEIN: 6.5 G/DL (ref 6.4–8.2)
WBC # BLD: 5.9 K/UL (ref 4–11)

## 2020-06-14 PROCEDURE — 99284 EMERGENCY DEPT VISIT MOD MDM: CPT

## 2020-06-14 PROCEDURE — 86900 BLOOD TYPING SEROLOGIC ABO: CPT

## 2020-06-14 PROCEDURE — 36415 COLL VENOUS BLD VENIPUNCTURE: CPT

## 2020-06-14 PROCEDURE — 86901 BLOOD TYPING SEROLOGIC RH(D): CPT

## 2020-06-14 PROCEDURE — 88300 SURGICAL PATH GROSS: CPT

## 2020-06-14 PROCEDURE — 85025 COMPLETE CBC W/AUTO DIFF WBC: CPT

## 2020-06-14 PROCEDURE — 84702 CHORIONIC GONADOTROPIN TEST: CPT

## 2020-06-14 PROCEDURE — 86850 RBC ANTIBODY SCREEN: CPT

## 2020-06-14 PROCEDURE — 80053 COMPREHEN METABOLIC PANEL: CPT

## 2020-06-14 ASSESSMENT — ENCOUNTER SYMPTOMS
SHORTNESS OF BREATH: 0
ABDOMINAL PAIN: 1
NAUSEA: 0
COLOR CHANGE: 0
VOMITING: 0
COUGH: 0

## 2020-06-14 ASSESSMENT — PAIN DESCRIPTION - LOCATION: LOCATION: ABDOMEN

## 2020-06-14 ASSESSMENT — PAIN SCALES - GENERAL: PAINLEVEL_OUTOF10: 7

## 2020-06-14 ASSESSMENT — PAIN DESCRIPTION - PAIN TYPE: TYPE: ACUTE PAIN

## 2020-06-14 ASSESSMENT — PAIN DESCRIPTION - DESCRIPTORS: DESCRIPTORS: CRAMPING

## 2020-06-15 NOTE — ED NOTES
Fetus placed in products of conception cup, document completed by this RN and [employer].  Products of conception form walked to lab by Silverio Sethi RN  06/14/20 2118

## 2020-06-15 NOTE — ED PROVIDER NOTES
the following components:    Sodium 131 (*)     Chloride 98 (*)     CO2 17 (*)     Glucose 329 (*)     CREATININE 0.5 (*)     Alb 3.3 (*)     Albumin/Globulin Ratio 1.0 (*)     ALT 9 (*)     AST 12 (*)     All other components within normal limits    Narrative:     Performed at:  [facility]  [address] [facility] [employer] [address]   Phone [phone]   HCG, QUANTITATIVE, PREGNANCY    Narrative:     Performed at:  [address] [employer] [address]   Phone [phone]   URINE RT REFLEX TO CULTURE   SURGICAL PATHOLOGY   TYPE AND SCREEN    Narrative:     Performed at:  [address] [employer] [address]   Phone [phone]   ABO/RH    Narrative:     Performed at:  [address] [employer] [address]   Phone [phone]       All other labs were within normal range or not returned as of this dictation. EKG: All EKG's are interpreted by the Emergency Department Physician in the absence of a cardiologist.  Please see their note for interpretation of EKG. RADIOLOGY:   Non-plain film images such as CT, Ultrasound and MRI are read by the radiologist. Plain radiographic images are visualized and preliminarily interpreted by the ED Provider with the below findings:    Interpretation per the Radiologist below, if available at the time of this note:    No orders to display     No results found.         PROCEDURES   Unless otherwise noted below, none     Procedures    CRITICAL CARE TIME   N/A    CONSULTS:  IP CONSULT TO OB GYN      EMERGENCY DEPARTMENT COURSE and DIFFERENTIAL DIAGNOSIS/MDM:   Vitals:    Vitals:    06/14/20 2000 06/14/20 2103 06/14/20 2131 06/14/20 2147   BP: 127/74 (!) 111/91 129/72 105/76   Pulse: 83 86 88 86   Resp: 15 15 15 17   Temp:       TempSrc:       SpO2: 100%  98%    Weight:       Height: Patient was given the following medications:  Medications - No data to display    ED [address] [facility]    Pertinent Labs & Imaging studies reviewed. (See chart for details)   -  Patient seen and evaluated in the emergency department. -  Triage and nursing notes reviewed and incorporated. -  Old chart records reviewed and incorporated. -   I have seen and evaluated this patient with my supervising physician Sadi Velasco MD.  -  Differential diagnosis includes: miscarriage, hemorrhage, other  -  Work-up included:  See above  - Consults: OBGYN - I consulted her OB to get recommendations for imaging studies - if they should be done tonight or could be done tomorrow morning. I spoke with Dr Luke Rosario, who advised that if her bleeding has slowed and her pain is well controlled, and she appears to be clinically stable she can follow-up tomorrow as an outpatient at the clinic at [facility] and have an ultrasound done at that time. If she gets worse tonight, Dr. Luke Rosario instructed me to tell the patient to proceed to [facility].  -  Results discussed with patient. Labs show no leukocytosis or concerning anemia, there are no concerning electrolyte abnormalities. She does have elevated blood glucose at 329, this appears to be her baseline, she has struggled with maintaining control of her diabetes for some time. Her blood type is O+. Her quantitative hCG is 19,065. Patient feels hungry and ready to go home. The bleeding has slowed significantly. At this time, we recommend discharge, and it is communicated to the patient that she needs to call her OB office tomorrow morning to arrange for a same-day appointment. She is given very strict return precautions. The patient is agreeable with plan of care and disposition.  -  Disposition:   Home in stable condition    FINAL IMPRESSION      1.  Miscarriage          DISPOSITION/PLAN   DISPOSITION        PATIENT REFERREDTO:  Chen Contreras MD  [id number]

## 2020-06-16 ASSESSMENT — ENCOUNTER SYMPTOMS
SHORTNESS OF BREATH: 0
CHEST TIGHTNESS: 0
STRIDOR: 0
CHOKING: 0
EYE ITCHING: 0
EYE DISCHARGE: 0
EYE REDNESS: 0
PHOTOPHOBIA: 0
COUGH: 0
WHEEZING: 0
ABDOMINAL PAIN: 1
APNEA: 0
ABDOMINAL DISTENTION: 0
EYE PAIN: 0

## 2020-06-17 NOTE — ED PROVIDER NOTES
Attending Supervising Physicians Attestation Statement  I was present with the Mid Level Provider during the history and exam. I discussed the findings and plans with the Mid Level Provider and agree as documented in her note     Amy HARTRonn rKueger is a 28 y.o. female who presents to the emergency department with vaginal bleeding and cramping x 1 day. Pain is acute 1/10 crampy and constant in nature. Has been taking OTC medications with minimal relief. Nothing makes symptoms better but movement makes symptoms worse. This has never happened before. No other associated symptoms other than previously mentioned. Review of Systems   Constitutional: Negative for activity change, appetite change, chills, diaphoresis, fatigue, fever and unexpected weight change. HENT: Negative for congestion, dental problem, drooling and ear discharge. Eyes: Negative for photophobia, pain, discharge, redness and itching. Respiratory: Negative for apnea, cough, choking, chest tightness, shortness of breath, wheezing and stridor. Cardiovascular: Negative for chest pain and leg swelling. Gastrointestinal: Positive for abdominal pain. Negative for abdominal distention. Endocrine: Negative for polyphagia and polyuria. Genitourinary: Positive for vaginal bleeding. Negative for vaginal discharge and vaginal pain. Musculoskeletal: Negative for arthralgias. Neurological: Negative for dizziness, facial asymmetry and headaches. Hematological: Negative for adenopathy. Does not bruise/bleed easily. Psychiatric/Behavioral: Negative for agitation, behavioral problems, confusion, decreased concentration, dysphoric mood, hallucinations, self-injury, sleep disturbance and suicidal ideas. The patient is not nervous/anxious and is not hyperactive.         Vitals:    06/14/20 2131 06/14/20 2147 06/14/20 2202 06/14/20 2217   BP: 129/72 105/76 126/72 126/75   Pulse: 88 86 85 87   Resp: 15 17 16 11   Temp:       TempSrc: SpO2: 98%      Weight:       Height:           PE  General- Non toxic Well appearing  CV- RRR  Resp- CTAB  Abdomen- Soft non tender abdomen  Ext- No leg swelling  Neuro- No focal deficits  Skin-No rash  Psych- Normal mood    EKG: All EKG's are interpreted by the Emergency Department Physician who either signs or Co-signs this chart in the absence of acardiologist.        RADIOLOGY:   Non-plain film images such as CT, Ultrasoundand MRI are read by the radiologist. Plain radiographic images are visualized and preliminarily interpreted by the emergency physician with the below findings:        ED BEDSIDE ULTRASOUND:   Performed by ED Physician - none    LABS:  Labs Reviewed   CBC WITH AUTO DIFFERENTIAL - Abnormal; Notable for the following components:       Result Value    RBC 3.97 (*)     Hemoglobin 11.4 (*)     Hematocrit 34.1 (*)     All other components within normal limits    Narrative:     Performed at:  [facility]  [address] [address]   Phone [phone]   COMPREHENSIVE METABOLIC PANEL W/ REFLEX TO MG FOR LOW K - Abnormal; Notable for the following components:    Sodium 131 (*)     Chloride 98 (*)     CO2 17 (*)     Glucose 329 (*)     CREATININE 0.5 (*)     Alb 3.3 (*)     Albumin/Globulin Ratio 1.0 (*)     ALT 9 (*)     AST 12 (*)     All other components within normal limits    Narrative:     Performed at:  [facility]  [address] [place], [place] [address]   Phone [phone]   HCG, QUANTITATIVE, PREGNANCY    Narrative:     Performed at:  [facility]  [address] [facility] [address]   Phone [phone]   SURGICAL PATHOLOGY    Narrative:                                          [address] [facility]                                       [address] [address]

## 2020-12-30 ENCOUNTER — TELEPHONE (OUTPATIENT)
Dept: PRIMARY CARE CLINIC | Age: 35
End: 2020-12-30

## 2020-12-30 RX ORDER — TIZANIDINE 4 MG/1
TABLET ORAL
Qty: 90 TABLET | Refills: 1 | Status: SHIPPED | OUTPATIENT
Start: 2020-12-30 | End: 2021-01-04 | Stop reason: SDUPTHER

## 2020-12-30 RX ORDER — PEN NEEDLE, DIABETIC 31 GX5/16"
NEEDLE, DISPOSABLE MISCELLANEOUS
Qty: 100 EACH | Refills: 1 | Status: SHIPPED | OUTPATIENT
Start: 2020-12-30 | End: 2021-02-02

## 2020-12-30 RX ORDER — INSULIN HUMAN 100 [IU]/ML
INJECTION, SUSPENSION SUBCUTANEOUS
Qty: 5 PEN | Refills: 1 | Status: SHIPPED | OUTPATIENT
Start: 2020-12-30 | End: 2021-01-04 | Stop reason: SDUPTHER

## 2020-12-30 RX ORDER — GLUCOSAMINE HCL/CHONDROITIN SU 500-400 MG
CAPSULE ORAL
Qty: 10 STRIP | Refills: 10 | Status: SHIPPED | OUTPATIENT
Start: 2020-12-30 | End: 2021-02-02

## 2020-12-30 RX ORDER — INSULIN HUMAN 100 [IU]/ML
INJECTION, SUSPENSION SUBCUTANEOUS
Qty: 5 PEN | Refills: 1 | Status: SHIPPED | OUTPATIENT
Start: 2020-12-30 | End: 2020-12-30 | Stop reason: SDUPTHER

## 2020-12-30 RX ORDER — DULOXETIN HYDROCHLORIDE 20 MG/1
CAPSULE, DELAYED RELEASE ORAL
Qty: 30 CAPSULE | Refills: 0 | Status: SHIPPED | OUTPATIENT
Start: 2020-12-30 | End: 2021-01-04 | Stop reason: SDUPTHER

## 2020-12-30 RX ORDER — ONDANSETRON 4 MG/1
4 TABLET, FILM COATED ORAL EVERY 8 HOURS PRN
Qty: 20 TABLET | Refills: 0 | Status: SHIPPED | OUTPATIENT
Start: 2020-12-30 | End: 2021-11-11

## 2021-02-02 ENCOUNTER — OFFICE VISIT (OUTPATIENT)
Dept: PRIMARY CARE CLINIC | Age: 36
End: 2021-02-02
Payer: MEDICARE

## 2021-02-02 VITALS
HEART RATE: 96 BPM | BODY MASS INDEX: 45.07 KG/M2 | OXYGEN SATURATION: 100 % | WEIGHT: 293 LBS | TEMPERATURE: 97.5 F | SYSTOLIC BLOOD PRESSURE: 130 MMHG | DIASTOLIC BLOOD PRESSURE: 84 MMHG

## 2021-02-02 DIAGNOSIS — E11.42 DIABETIC POLYNEUROPATHY ASSOCIATED WITH TYPE 2 DIABETES MELLITUS (HCC): ICD-10-CM

## 2021-02-02 DIAGNOSIS — E11.65 UNCONTROLLED TYPE 2 DIABETES MELLITUS WITH HYPERGLYCEMIA (HCC): Primary | ICD-10-CM

## 2021-02-02 PROBLEM — E11.40 DIABETIC NEUROPATHY ASSOCIATED WITH TYPE 2 DIABETES MELLITUS (HCC): Status: ACTIVE | Noted: 2021-02-02

## 2021-02-02 LAB — HBA1C MFR BLD: 12.5 %

## 2021-02-02 PROCEDURE — 99213 OFFICE O/P EST LOW 20 MIN: CPT | Performed by: FAMILY MEDICINE

## 2021-02-02 PROCEDURE — G8417 CALC BMI ABV UP PARAM F/U: HCPCS | Performed by: FAMILY MEDICINE

## 2021-02-02 PROCEDURE — 83036 HEMOGLOBIN GLYCOSYLATED A1C: CPT | Performed by: FAMILY MEDICINE

## 2021-02-02 PROCEDURE — G8484 FLU IMMUNIZE NO ADMIN: HCPCS | Performed by: FAMILY MEDICINE

## 2021-02-02 PROCEDURE — 1036F TOBACCO NON-USER: CPT | Performed by: FAMILY MEDICINE

## 2021-02-02 PROCEDURE — 2022F DILAT RTA XM EVC RTNOPTHY: CPT | Performed by: FAMILY MEDICINE

## 2021-02-02 PROCEDURE — 3046F HEMOGLOBIN A1C LEVEL >9.0%: CPT | Performed by: FAMILY MEDICINE

## 2021-02-02 PROCEDURE — G8427 DOCREV CUR MEDS BY ELIG CLIN: HCPCS | Performed by: FAMILY MEDICINE

## 2021-02-02 RX ORDER — DULOXETIN HYDROCHLORIDE 60 MG/1
60 CAPSULE, DELAYED RELEASE ORAL DAILY
Qty: 30 CAPSULE | Refills: 3 | Status: SHIPPED
Start: 2021-02-02 | End: 2021-02-19 | Stop reason: DRUGHIGH

## 2021-02-02 RX ORDER — GLIPIZIDE 10 MG/1
10 TABLET ORAL 2 TIMES DAILY
Qty: 60 TABLET | Refills: 3 | Status: SHIPPED | OUTPATIENT
Start: 2021-02-02 | End: 2021-06-15

## 2021-02-02 SDOH — ECONOMIC STABILITY: INCOME INSECURITY: HOW HARD IS IT FOR YOU TO PAY FOR THE VERY BASICS LIKE FOOD, HOUSING, MEDICAL CARE, AND HEATING?: VERY HARD

## 2021-02-02 SDOH — ECONOMIC STABILITY: TRANSPORTATION INSECURITY
IN THE PAST 12 MONTHS, HAS LACK OF TRANSPORTATION KEPT YOU FROM MEETINGS, WORK, OR FROM GETTING THINGS NEEDED FOR DAILY LIVING?: NO

## 2021-02-02 SDOH — ECONOMIC STABILITY: TRANSPORTATION INSECURITY
IN THE PAST 12 MONTHS, HAS THE LACK OF TRANSPORTATION KEPT YOU FROM MEDICAL APPOINTMENTS OR FROM GETTING MEDICATIONS?: NO

## 2021-02-02 ASSESSMENT — COLUMBIA-SUICIDE SEVERITY RATING SCALE - C-SSRS
2. HAVE YOU ACTUALLY HAD ANY THOUGHTS OF KILLING YOURSELF?: NO
6. HAVE YOU EVER DONE ANYTHING, STARTED TO DO ANYTHING, OR PREPARED TO DO ANYTHING TO END YOUR LIFE?: NO

## 2021-02-02 ASSESSMENT — PATIENT HEALTH QUESTIONNAIRE - PHQ9
7. TROUBLE CONCENTRATING ON THINGS, SUCH AS READING THE NEWSPAPER OR WATCHING TELEVISION: 3
2. FEELING DOWN, DEPRESSED OR HOPELESS: 3
4. FEELING TIRED OR HAVING LITTLE ENERGY: 3
SUM OF ALL RESPONSES TO PHQ9 QUESTIONS 1 & 2: 6
5. POOR APPETITE OR OVEREATING: 2
SUM OF ALL RESPONSES TO PHQ QUESTIONS 1-9: 21
8. MOVING OR SPEAKING SO SLOWLY THAT OTHER PEOPLE COULD HAVE NOTICED. OR THE OPPOSITE, BEING SO FIGETY OR RESTLESS THAT YOU HAVE BEEN MOVING AROUND A LOT MORE THAN USUAL: 3
3. TROUBLE FALLING OR STAYING ASLEEP: 1
10. IF YOU CHECKED OFF ANY PROBLEMS, HOW DIFFICULT HAVE THESE PROBLEMS MADE IT FOR YOU TO DO YOUR WORK, TAKE CARE OF THINGS AT HOME, OR GET ALONG WITH OTHER PEOPLE: 1
SUM OF ALL RESPONSES TO PHQ QUESTIONS 1-9: 21
9. THOUGHTS THAT YOU WOULD BE BETTER OFF DEAD, OR OF HURTING YOURSELF: 0

## 2021-02-02 NOTE — PROGRESS NOTES
HPI 29 y/o female known poorly controlled diabetic,  Hypertensive here for follow up visit today. She has had elevated blood glucose 150-270  Without polyuria/polydipsia/blurred vision or  Significant wt changes    No hypoglycemia    Current Outpatient Medications   Medication Sig Dispense Refill    gabapentin (NEURONTIN) 300 MG capsule Take 1 capsule by mouth 2 times daily for 180 days. Intended supply: 30 days 60 capsule 5    DULoxetine (CYMBALTA) 20 MG extended release capsule TAKE ONE CAPSULE BY MOUTH DAILY 30 capsule 0    amLODIPine (NORVASC) 10 MG tablet Take 1 tablet by mouth daily 30 tablet 3    Insulin NPH Isophane & Regular (HUMULIN 70/30 KWIKPEN) (70-30) 100 UNIT per ML injection pen 30 units under skin AM before breakfast  And 10 units under skin PM before dinner 5 pen 1    metFORMIN (GLUCOPHAGE) 500 MG tablet Take one tab twice a day 180 tablet 1    tiZANidine (ZANAFLEX) 4 MG tablet Take one tab 3 times a day as needed ok for early refill 90 tablet 1    traZODone (DESYREL) 50 MG tablet Take one tab nightly for sleep 30 tablet 5    ondansetron (ZOFRAN) 4 MG tablet Take 1 tablet by mouth every 8 hours as needed for Nausea or Vomiting (prn nausea or vomiting) 20 tablet 0    acetaminophen (APAP EXTRA STRENGTH) 500 MG tablet One tab 4 times 120 tablet 3     No current facility-administered medications for this visit. Physical Exam  Constitutional:       General: She is not in acute distress. Appearance: She is well-developed. She is not diaphoretic. HENT:      Head: Normocephalic and atraumatic. Right Ear: External ear normal.      Left Ear: External ear normal.      Nose: Nose normal.      Mouth/Throat:      Pharynx: No oropharyngeal exudate. Eyes:      General: No scleral icterus. Left eye: No discharge. Conjunctiva/sclera: Conjunctivae normal.      Pupils: Pupils are equal, round, and reactive to light.    Neck: - DULoxetine (CYMBALTA) 60 MG extended release capsule; Take 1 capsule by mouth daily  Dispense: 30 capsule; Refill: 3    2. Uncontrolled type 2 diabetes mellitus with hyperglycemia ([address]  See 1 above  - POCT glycosylated hemoglobin (Hb A1C)  - glipiZIDE (GLUCOTROL) 10 MG tablet; Take 1 tablet by mouth 2 times daily  Dispense: 60 tablet;  Refill: 3      4 weeks fu

## 2021-02-18 ENCOUNTER — TELEPHONE (OUTPATIENT)
Dept: PRIMARY CARE CLINIC | Age: 36
End: 2021-02-18

## 2021-02-18 NOTE — TELEPHONE ENCOUNTER
Pt said diabetic neuropathy medication was increased Cymbalta and it is driving her crazy. She has not slept in 3-4 days. Her heart is beating fast also. Pt is feeling suicidal.          Her feet are constantly burning even with the increase in medication. Pt needs advice immediately.       Patent [phone]

## 2021-02-19 ENCOUNTER — OFFICE VISIT (OUTPATIENT)
Dept: PRIMARY CARE CLINIC | Age: 36
End: 2021-02-19
Payer: MEDICARE

## 2021-02-19 VITALS
HEART RATE: 111 BPM | DIASTOLIC BLOOD PRESSURE: 94 MMHG | WEIGHT: 283 LBS | TEMPERATURE: 97 F | BODY MASS INDEX: 42.89 KG/M2 | SYSTOLIC BLOOD PRESSURE: 168 MMHG | HEIGHT: 68 IN | OXYGEN SATURATION: 99 %

## 2021-02-19 DIAGNOSIS — R00.0 ELEVATED PULSE RATE: ICD-10-CM

## 2021-02-19 DIAGNOSIS — R07.9 CHEST PAIN, UNSPECIFIED TYPE: ICD-10-CM

## 2021-02-19 DIAGNOSIS — R00.0 RAPID HEART BEAT: ICD-10-CM

## 2021-02-19 DIAGNOSIS — E11.42 DIABETIC POLYNEUROPATHY ASSOCIATED WITH TYPE 2 DIABETES MELLITUS (HCC): Primary | ICD-10-CM

## 2021-02-19 DIAGNOSIS — R94.31 ABNORMAL EKG: ICD-10-CM

## 2021-02-19 PROCEDURE — G8427 DOCREV CUR MEDS BY ELIG CLIN: HCPCS | Performed by: FAMILY MEDICINE

## 2021-02-19 PROCEDURE — 3046F HEMOGLOBIN A1C LEVEL >9.0%: CPT | Performed by: FAMILY MEDICINE

## 2021-02-19 PROCEDURE — G8417 CALC BMI ABV UP PARAM F/U: HCPCS | Performed by: FAMILY MEDICINE

## 2021-02-19 PROCEDURE — 2022F DILAT RTA XM EVC RTNOPTHY: CPT | Performed by: FAMILY MEDICINE

## 2021-02-19 PROCEDURE — 99214 OFFICE O/P EST MOD 30 MIN: CPT | Performed by: FAMILY MEDICINE

## 2021-02-19 PROCEDURE — G8484 FLU IMMUNIZE NO ADMIN: HCPCS | Performed by: FAMILY MEDICINE

## 2021-02-19 PROCEDURE — 1036F TOBACCO NON-USER: CPT | Performed by: FAMILY MEDICINE

## 2021-02-19 PROCEDURE — 93000 ELECTROCARDIOGRAM COMPLETE: CPT | Performed by: FAMILY MEDICINE

## 2021-02-19 RX ORDER — ASPIRIN 81 MG/1
81 TABLET ORAL DAILY
Qty: 90 TABLET | Refills: 1 | Status: SHIPPED | OUTPATIENT
Start: 2021-02-19 | End: 2021-11-11 | Stop reason: ALTCHOICE

## 2021-02-19 RX ORDER — GABAPENTIN 600 MG/1
600 TABLET ORAL 3 TIMES DAILY
Qty: 90 TABLET | Refills: 3 | Status: SHIPPED | OUTPATIENT
Start: 2021-02-19 | End: 2021-06-15

## 2021-02-19 RX ORDER — METOPROLOL SUCCINATE 25 MG/1
12.5 TABLET, EXTENDED RELEASE ORAL DAILY
Qty: 45 TABLET | Refills: 1 | Status: SHIPPED | OUTPATIENT
Start: 2021-02-19 | End: 2021-08-13

## 2021-02-19 RX ORDER — AMITRIPTYLINE HYDROCHLORIDE 25 MG/1
25 TABLET, FILM COATED ORAL NIGHTLY
Qty: 30 TABLET | Refills: 0 | Status: SHIPPED | OUTPATIENT
Start: 2021-02-19 | End: 2021-03-16

## 2021-02-19 NOTE — PATIENT INSTRUCTIONS
Increase humulin 70/30 35 u am, 20 u pm  Increase gabapentin 600  Mg TID    Stop trazodone  Stop cymbalta  Start amitriptyline 25 mg hs    See me in 4 weeks

## 2021-02-19 NOTE — PROGRESS NOTES
General: Skin is warm and dry. Coloration: Skin is not pale. Findings: No erythema or rash. Neurological:      Mental Status: She is alert and oriented to person, place, and time. Cranial Nerves: No cranial nerve deficit. Coordination: Coordination normal.      Deep Tendon Reflexes: Reflexes are normal and symmetric. Reflexes normal.   Psychiatric:         Behavior: Behavior normal.         Thought Content: Thought content normal.         Judgment: Judgment normal.     Visual inspection:  Deformity/amputation: absent  Skin lesions/pre-ulcerative calluses: absent  Edema: right- negative, left- negative    Sensory exam:  Monofilament sensation: normal  (minimum of 5 random plantar locations tested, avoiding callused areas - > 1 area with absence of sensation is + for neuropathy)    Plus at least one of the following:  Pulses: normal,   Pinprick: Intact  Proprioception: Intact  Vibration (128 Hz): Intact    1. Diabetic polyneuropathy associated with type 2 diabetes mellitus ([address]    Long discussion with pt about her diabetic neuropathy likely related to high  Glucose  Humulin 70/30  Increase to 35 u in am  Humulin 70/30 increase to 20 un in pm  Cont other meds      Increase gabapentin 600 mg TID   Elavil 25 mg QHS  Stop trazodone  Stop cymbalta    See me in 4 weeks  - gabapentin (NEURONTIN) 600 MG tablet; Take 1 tablet by mouth 3 times daily for 90 days. Stop gabapentin 300 mg  Dispense: 90 tablet;  Refill: 3      Rapid heart beat    Chest pain    Hypertension  Did not take bp meds today    EKG 94  SR  ?OLD Q waves precordial  No acute changes no comparison  Check lipids  Low dose asa ec  Small dose BB

## 2021-03-16 DIAGNOSIS — M25.561 CHRONIC PAIN OF RIGHT KNEE: ICD-10-CM

## 2021-03-16 DIAGNOSIS — E11.42 DIABETIC POLYNEUROPATHY ASSOCIATED WITH TYPE 2 DIABETES MELLITUS (HCC): ICD-10-CM

## 2021-03-16 DIAGNOSIS — G89.29 CHRONIC PAIN OF RIGHT KNEE: ICD-10-CM

## 2021-03-16 RX ORDER — TIZANIDINE 4 MG/1
4 TABLET ORAL EVERY 8 HOURS PRN
Qty: 90 TABLET | Refills: 1 | Status: SHIPPED | OUTPATIENT
Start: 2021-03-16 | End: 2021-06-17

## 2021-03-16 RX ORDER — AMITRIPTYLINE HYDROCHLORIDE 25 MG/1
25 TABLET, FILM COATED ORAL NIGHTLY
Qty: 30 TABLET | Refills: 2 | Status: SHIPPED | OUTPATIENT
Start: 2021-03-16 | End: 2021-06-15

## 2021-03-16 NOTE — TELEPHONE ENCOUNTER
----- Message from Monie Barbosa sent at 3/15/2021  1:20 PM EDT -----  Subject: Refill Request    QUESTIONS  Name of Medication? tiZANidine (ZANAFLEX) 4 MG tablet  Patient-reported dosage and instructions? 4 mg 3 times  How many days do you have left? 0  Preferred Pharmacy? [address] [facility] phone number (if available)? [phone]  Additional Information for Provider? Patient needs refill. Also wants to   know if she can take meds 4 times per day   instead of 3   due to working on her feet all day  ---------------------------------------------------------------------------  --------------    Name of Medication? gabapentin (NEURONTIN) 600 MG tablet  Patient-reported dosage and instructions? 50mg  How many days do you have left? 0  Preferred Pharmacy? [address] [facility] phone number (if available)? [phone]  Additional Information for Provider? Patient asked if her dosage can be   upped to 50mg  ---------------------------------------------------------------------------  --------------  CALL BACK INFO  What is the best way for the office to contact you? OK to leave message on   voicemail  Preferred Call Back Phone Number?  [phone]

## 2021-03-16 NOTE — TELEPHONE ENCOUNTER
**see note below. Pt wants increase in frequency. Not sure what they mean by increasing the Gabapentin to 50 mg?? The Gabapentin was already sent to pharmacy on 2/19/21.

## 2021-03-16 NOTE — TELEPHONE ENCOUNTER
QUESTIONS   Name of Medication? tiZANidine (ZANAFLEX) 4 MG tablet   Patient-reported dosage and instructions? 4 mg 3 times   How many days do you have left? 0   Preferred Pharmacy? [address] [facility] phone number (if available)? [phone]   Additional Information for Provider? Patient needs refill. Also wants to   know if she can take meds 4 times per day    instead of 3    due to working on her feet all day   ---------------------------------------------------------------------------   --------------     Name of Medication? gabapentin (NEURONTIN) 600 MG tablet   Patient-reported dosage and instructions? 50mg   How many days do you have left? 0   Preferred Pharmacy? [address] [facility] phone number (if available)? [phone]   Additional Information for Provider? Patient asked if her dosage can be   upped to 50mg   ---------------------------------------------------------------------------   --------------   CALL BACK INFO   What is the best way for the office to contact you? OK to leave message on   voicemail   Preferred Call Back Phone Number?  [phone]

## 2021-06-14 DIAGNOSIS — I10 ESSENTIAL HYPERTENSION: ICD-10-CM

## 2021-06-14 DIAGNOSIS — E11.65 UNCONTROLLED TYPE 2 DIABETES MELLITUS WITH HYPERGLYCEMIA (HCC): ICD-10-CM

## 2021-06-14 DIAGNOSIS — E11.42 DIABETIC POLYNEUROPATHY ASSOCIATED WITH TYPE 2 DIABETES MELLITUS (HCC): ICD-10-CM

## 2021-06-15 DIAGNOSIS — E11.42 DIABETIC POLYNEUROPATHY ASSOCIATED WITH TYPE 2 DIABETES MELLITUS (HCC): ICD-10-CM

## 2021-06-15 RX ORDER — GABAPENTIN 600 MG/1
TABLET ORAL
Qty: 90 TABLET | Refills: 2 | Status: SHIPPED | OUTPATIENT
Start: 2021-06-15 | End: 2021-09-11

## 2021-06-15 RX ORDER — GLIPIZIDE 10 MG/1
TABLET ORAL
Qty: 60 TABLET | Refills: 2 | Status: SHIPPED | OUTPATIENT
Start: 2021-06-15 | End: 2021-09-11

## 2021-06-15 RX ORDER — AMITRIPTYLINE HYDROCHLORIDE 25 MG/1
TABLET, FILM COATED ORAL
Qty: 30 TABLET | Refills: 1 | Status: SHIPPED | OUTPATIENT
Start: 2021-06-15 | End: 2021-08-13

## 2021-06-15 RX ORDER — AMLODIPINE BESYLATE 10 MG/1
TABLET ORAL
Qty: 30 TABLET | Refills: 2 | Status: SHIPPED | OUTPATIENT
Start: 2021-06-15 | End: 2021-09-11

## 2021-08-12 DIAGNOSIS — E11.42 DIABETIC POLYNEUROPATHY ASSOCIATED WITH TYPE 2 DIABETES MELLITUS (HCC): ICD-10-CM

## 2021-08-12 DIAGNOSIS — G89.29 CHRONIC PAIN OF RIGHT KNEE: ICD-10-CM

## 2021-08-12 DIAGNOSIS — M25.561 CHRONIC PAIN OF RIGHT KNEE: ICD-10-CM

## 2021-08-13 RX ORDER — TIZANIDINE 4 MG/1
TABLET ORAL
Qty: 90 TABLET | Refills: 1 | Status: SHIPPED | OUTPATIENT
Start: 2021-08-13 | End: 2021-10-13 | Stop reason: SDUPTHER

## 2021-08-13 RX ORDER — AMITRIPTYLINE HYDROCHLORIDE 25 MG/1
TABLET, FILM COATED ORAL
Qty: 30 TABLET | Refills: 1 | Status: ON HOLD | OUTPATIENT
Start: 2021-08-13 | End: 2021-11-04

## 2021-10-12 DIAGNOSIS — G89.29 CHRONIC PAIN OF RIGHT KNEE: ICD-10-CM

## 2021-10-12 DIAGNOSIS — M25.561 CHRONIC PAIN OF RIGHT KNEE: ICD-10-CM

## 2021-10-12 RX ORDER — TIZANIDINE 4 MG/1
TABLET ORAL
Qty: 90 TABLET | Refills: 1 | OUTPATIENT
Start: 2021-10-12

## 2021-10-13 DIAGNOSIS — M25.561 CHRONIC PAIN OF RIGHT KNEE: ICD-10-CM

## 2021-10-13 DIAGNOSIS — E11.42 DIABETIC POLYNEUROPATHY ASSOCIATED WITH TYPE 2 DIABETES MELLITUS (HCC): ICD-10-CM

## 2021-10-13 DIAGNOSIS — G89.29 CHRONIC PAIN OF RIGHT KNEE: ICD-10-CM

## 2021-10-13 RX ORDER — TIZANIDINE 4 MG/1
4 TABLET ORAL EVERY 8 HOURS PRN
Qty: 90 TABLET | Refills: 0 | Status: ON HOLD | OUTPATIENT
Start: 2021-10-13 | End: 2021-11-04 | Stop reason: SDUPTHER

## 2021-10-14 RX ORDER — GABAPENTIN 600 MG/1
TABLET ORAL
Qty: 90 TABLET | Refills: 2 | Status: ON HOLD
Start: 2021-10-14 | End: 2021-11-04 | Stop reason: HOSPADM

## 2021-10-14 NOTE — TELEPHONE ENCOUNTER
Medication:   Requested Prescriptions     Pending Prescriptions Disp Refills    gabapentin (NEURONTIN) 600 MG tablet [Pharmacy Med Name: GABAPENTIN 600 MG TABLET] 90 tablet 0     Sig: TAKE ONE TABLET BY MOUTH THREE TIMES A DAY (STOP GABAPENTIN 300MG)       Last Filled:      Patient Phone Number: [phone] (home)     Last appt: 2/19/2021   Next appt: 10/27/2021    Last Labs DM:   Lab Results   Component Value Date    LABA1C 12.5 02/02/2021

## 2021-10-25 ENCOUNTER — HOSPITAL ENCOUNTER (EMERGENCY)
Age: 36
Discharge: HOME | End: 2021-10-25
Attending: EMERGENCY MEDICINE
Payer: MEDICARE

## 2021-10-25 ENCOUNTER — APPOINTMENT (OUTPATIENT)
Dept: ULTRASOUND IMAGING | Age: 36
End: 2021-10-25
Payer: MEDICARE

## 2021-10-25 VITALS
TEMPERATURE: 98 F | BODY MASS INDEX: 44.41 KG/M2 | HEART RATE: 124 BPM | SYSTOLIC BLOOD PRESSURE: 162 MMHG | WEIGHT: 293 LBS | HEIGHT: 68 IN | RESPIRATION RATE: 18 BRPM | DIASTOLIC BLOOD PRESSURE: 92 MMHG | OXYGEN SATURATION: 100 %

## 2021-10-25 DIAGNOSIS — Z32.01 PREGNANCY TEST POSITIVE: Primary | ICD-10-CM

## 2021-10-25 DIAGNOSIS — N96 HISTORY OF MULTIPLE MISCARRIAGES: ICD-10-CM

## 2021-10-25 LAB
ABO/RH: NORMAL
ANION GAP SERPL CALCULATED.3IONS-SCNC: 19 MMOL/L (ref 3–16)
BACTERIA: ABNORMAL /HPF
BASOPHILS ABSOLUTE: 0.1 K/UL (ref 0–0.2)
BASOPHILS RELATIVE PERCENT: 0.7 %
BILIRUBIN URINE: ABNORMAL
BLOOD, URINE: ABNORMAL
BUN BLDV-MCNC: 15 MG/DL (ref 7–20)
CALCIUM SERPL-MCNC: 9.9 MG/DL (ref 8.3–10.6)
CHLORIDE BLD-SCNC: 97 MMOL/L (ref 99–110)
CLARITY: ABNORMAL
CO2: 17 MMOL/L (ref 21–32)
COLOR: YELLOW
CREAT SERPL-MCNC: 0.8 MG/DL (ref 0.6–1.1)
EOSINOPHILS ABSOLUTE: 0.1 K/UL (ref 0–0.6)
EOSINOPHILS RELATIVE PERCENT: 0.8 %
EPITHELIAL CELLS, UA: 9 /HPF (ref 0–5)
GFR AFRICAN AMERICAN: >60
GFR NON-AFRICAN AMERICAN: >60
GLUCOSE BLD-MCNC: 315 MG/DL (ref 70–99)
GLUCOSE URINE: >=1000 MG/DL
GONADOTROPIN, CHORIONIC (HCG) QUANT: 883.6 MIU/ML
HCT VFR BLD CALC: 42.3 % (ref 36–48)
HEMOGLOBIN: 14.3 G/DL (ref 12–16)
HYALINE CASTS: 12 /LPF (ref 0–8)
KETONES, URINE: 40 MG/DL
LEUKOCYTE ESTERASE, URINE: NEGATIVE
LYMPHOCYTES ABSOLUTE: 2.9 K/UL (ref 1–5.1)
LYMPHOCYTES RELATIVE PERCENT: 41 %
MCH RBC QN AUTO: 29 PG (ref 26–34)
MCHC RBC AUTO-ENTMCNC: 33.7 G/DL (ref 31–36)
MCV RBC AUTO: 86.1 FL (ref 80–100)
MICROSCOPIC EXAMINATION: YES
MONOCYTES ABSOLUTE: 0.5 K/UL (ref 0–1.3)
MONOCYTES RELATIVE PERCENT: 7.6 %
NEUTROPHILS ABSOLUTE: 3.5 K/UL (ref 1.7–7.7)
NEUTROPHILS RELATIVE PERCENT: 49.9 %
NITRITE, URINE: NEGATIVE
PDW BLD-RTO: 14.6 % (ref 12.4–15.4)
PH UA: 5.5 (ref 5–8)
PLATELET # BLD: 331 K/UL (ref 135–450)
PMV BLD AUTO: 8 FL (ref 5–10.5)
POTASSIUM REFLEX MAGNESIUM: 4.7 MMOL/L (ref 3.5–5.1)
PROTEIN UA: 30 MG/DL
RBC # BLD: 4.91 M/UL (ref 4–5.2)
RBC UA: 3 /HPF (ref 0–4)
SODIUM BLD-SCNC: 133 MMOL/L (ref 136–145)
SPECIFIC GRAVITY UA: >1.03 (ref 1–1.03)
URINE REFLEX TO CULTURE: ABNORMAL
URINE TYPE: ABNORMAL
UROBILINOGEN, URINE: 0.2 E.U./DL
WBC # BLD: 7 K/UL (ref 4–11)
WBC UA: 4 /HPF (ref 0–5)

## 2021-10-25 PROCEDURE — 99284 EMERGENCY DEPT VISIT MOD MDM: CPT

## 2021-10-25 PROCEDURE — 86901 BLOOD TYPING SEROLOGIC RH(D): CPT

## 2021-10-25 PROCEDURE — 84702 CHORIONIC GONADOTROPIN TEST: CPT

## 2021-10-25 PROCEDURE — 85025 COMPLETE CBC W/AUTO DIFF WBC: CPT

## 2021-10-25 PROCEDURE — 80048 BASIC METABOLIC PNL TOTAL CA: CPT

## 2021-10-25 PROCEDURE — 76817 TRANSVAGINAL US OBSTETRIC: CPT

## 2021-10-25 PROCEDURE — 76801 OB US < 14 WKS SINGLE FETUS: CPT

## 2021-10-25 PROCEDURE — 86900 BLOOD TYPING SEROLOGIC ABO: CPT

## 2021-10-25 PROCEDURE — 81001 URINALYSIS AUTO W/SCOPE: CPT

## 2021-10-25 PROCEDURE — 36415 COLL VENOUS BLD VENIPUNCTURE: CPT

## 2021-10-25 ASSESSMENT — PAIN SCALES - GENERAL: PAINLEVEL_OUTOF10: 7

## 2021-10-25 ASSESSMENT — PAIN DESCRIPTION - DESCRIPTORS: DESCRIPTORS: ACHING

## 2021-10-25 ASSESSMENT — PAIN DESCRIPTION - LOCATION: LOCATION: PELVIS

## 2021-10-25 NOTE — ED PROVIDER NOTES
**ADVANCED PRACTICE PROVIDER, I HAVE EVALUATED THIS PATIENT**        [address]      Pt Name: Fercho Childers  RGN:[id number]  Chit[place] 1985  Date of evaluation: 10/25/2021  Provider: ALIYA Vasquez CNP      Chief Complaint:    Chief Complaint   Patient presents with    Pelvic Pain     + pregnancy a week ago, now having pelvic pain and spotting         Nursing Notes, Past Medical Hx, Past Surgical Hx, Social Hx, Allergies, and Family Hx were all reviewed and agreed with or any disagreements were addressed in the HPI.    HPI: (Location, Duration, Timing, Severity, Quality, Assoc Sx, Context, Modifying factors)    Chief Complaint of pelvic pain in pregnancy. This is a  39 y.o. female who presents with pelvic pain in pregnancy, she state that about one week ago she found out she was pregnant. She had some spotting pinkish red color around 11am. Her pelvic pain started around 4am, she states her LMP 21. She says the cramping comes and goes, rates the pain a 7-10. She is G-7, P-2, A-4 (1 , 1 tubal and 2 miscarries) she denies any abnormal vaginal bleeding or discharge, no urinary symptoms. Denies any cough, congestion, fever or chills. Denies any nausea vomiting or diarrhea. Denies taking any medicine for symptoms, denies additional complaints, no additional aggravating relieving factors. Patient presents awake, alert and in no acute distress or toxic appearance.     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          Procedure Laterality Date    APPENDECTOMY  2018     Laparoscopic appendectomy     CHOLECYSTECTOMY, LAPAROSCOPIC      LAPAROSCOPIC APPENDECTOMY N/A 12/14/2018    LAPAROSCOPIC APPENDECTOMY performed by Amy Antonio MD at [facility] [address]  07/18/2014    COLPOSCOPY OF VULVA, VAGINA AND CERVIX WITH CO-2 LASER       Medications:  Discharge Medication List as of 10/25/2021  8:15 PM      CONTINUE these medications which have NOT CHANGED    Details   gabapentin (NEURONTIN) 600 MG tablet TAKE ONE TABLET BY MOUTH THREE TIMES A DAY (STOP GABAPENTIN 300MG), Disp-90 tablet, R-2Normal      tiZANidine (ZANAFLEX) 4 MG tablet Take 1 tablet by mouth every 8 hours as needed (muscle pain), Disp-90 tablet, R-0Normal      amLODIPine (NORVASC) 10 MG tablet TAKE ONE TABLET BY MOUTH DAILY, Disp-30 tablet, R-0Normal      glipiZIDE (GLUCOTROL) 10 MG tablet TAKE ONE TABLET BY MOUTH TWICE A DAY, Disp-60 tablet, R-0Make office appointmentNormal      !! amitriptyline (ELAVIL) 25 MG tablet TAKE ONE TABLET BY MOUTH ONCE NIGHTLY, Disp-30 tablet, R-1Normal      metoprolol succinate (TOPROL XL) 25 MG extended release tablet TAKE 1/2 TABLET BY MOUTH DAILY, Disp-15 tablet, R-3Normal      !! amitriptyline (ELAVIL) 25 MG tablet TAKE ONE TABLET BY MOUTH ONCE NIGHTLY *STOP CYMBALTA, STOP TRAZODONE*, Disp-30 tablet, R-3Make a 3 month follow visitNormal      DULoxetine (CYMBALTA) 20 MG extended release capsule TAKE ONE CAPSULE BY MOUTH DAILY, Disp-30 capsule, R-3Make a 3 month follow visitNormal      aspirin EC 81 MG EC tablet Take 1 tablet by mouth daily, Disp-90 tablet, R-1Normal      Insulin NPH Isophane & Regular (HUMULIN 70/30 KWIKPEN) (70-30) 100 UNIT per ML injection pen 30 units under skin AM before breakfast  And 10 units under skin PM before dinner, Disp-5 pen, R-1Normal      metFORMIN (GLUCOPHAGE) 500 MG tablet Take one tab twice a day, Disp-180 tablet, R-1Normal      ondansetron (ZOFRAN) 4 MG tablet Take 1 tablet by mouth every 8 hours as needed for Nausea or Vomiting (prn nausea or vomiting), Disp-20 tablet, R-0Normal      acetaminophen (APAP EXTRA STRENGTH) 500 MG tablet One tab 4 times, Disp-120 tablet, R-3Normal       !! - Potential duplicate medications found. Please discuss with provider. Review of Systems:  (2-9 systems needed)  Review of Systems   Constitutional: Negative for chills and fever. HENT: Negative for congestion. Respiratory: Negative for cough, shortness of breath and wheezing. Cardiovascular: Negative for chest pain. Gastrointestinal: Negative for abdominal pain, diarrhea, nausea and vomiting. Genitourinary: Positive for pelvic pain and vaginal bleeding. Negative for difficulty urinating, dysuria, frequency, hematuria, urgency and vaginal discharge. Patient complains of pelvic pain in pregnancy, she state that about one week ago she found out she was pregnant. She had some spotting pinkish red color around 11am. Her pelvic pain started around 4am, she states her LMP 21. She says the cramping comes and goes, rates the pain a 7-10. She is G-7, P-2, A-4 (1 , 1 tubal and 2 miscarries) she denies any abnormal vaginal bleeding or discharge, no urinary symptoms. Musculoskeletal: Negative for back pain. Skin: Negative for color change. Neurological: Negative for weakness, numbness and headaches. \"Positives and Pertinent negatives as per HPI\"    Physical Exam:  Physical Exam  Vitals and nursing note reviewed. Constitutional:       Appearance: She is well-developed. She is not diaphoretic. HENT:      Head: Normocephalic. Right Ear: External ear normal.      Left Ear: External ear normal.   Eyes:      General: Scleral icterus present. Right eye: No discharge. Left eye: No discharge. Cardiovascular:      Rate and Rhythm: Normal rate. Pulmonary:      Effort: Pulmonary effort is normal. No respiratory distress. Breath sounds: Normal breath sounds. Abdominal:      Palpations: Abdomen is soft. Comments: Abdomen is soft nondistended.   Bowel sounds are positive, patient was complaining of discomfort in the suprapubic region and cramping in pregnancy however on exam she has no abdominal tenderness, guarding or rebound tenderness. No ascites or rigidity. Musculoskeletal:         General: Normal range of motion. Cervical back: Normal range of motion and neck supple. Skin:     General: Skin is warm. Capillary Refill: Capillary refill takes less than 2 seconds. Coloration: Skin is not pale. Neurological:      General: No focal deficit present. Mental Status: She is alert and oriented to person, place, and time. GCS: GCS eye subscore is 4. GCS verbal subscore is 5. GCS motor subscore is 6.    Psychiatric:         Behavior: Behavior normal.         MEDICAL DECISION MAKING    Vitals:    Vitals:    10/25/21 1600 10/25/21 1715 10/25/21 1745 10/25/21 1954   BP: (!) 139/93 (!) 146/85 (!) 142/80 (!) 162/92   Pulse: 115 113  124   Resp:  18  18   Temp:    98 °F (36.7 °C)   TempSrc:    Oral   SpO2:    100%   Weight:       Height:           LABS:  Labs Reviewed   BASIC METABOLIC PANEL W/ REFLEX TO MG FOR LOW K - Abnormal; Notable for the following components:       Result Value    Sodium 133 (*)     Chloride 97 (*)     CO2 17 (*)     Anion Gap 19 (*)     Glucose 315 (*)     All other components within normal limits    Narrative:     Performed at:  [facility]  [address] [facility] [address]   Phone [phone]   URINE RT REFLEX TO CULTURE - Abnormal; Notable for the following components:    Clarity, UA CLOUDY (*)     Glucose, Ur >=1000 (*)     Bilirubin Urine MODERATE (*)     Ketones, Urine 40 (*)     Blood, Urine LARGE (*)     Protein, UA 30 (*)     All other components within normal limits    Narrative:     Performed at:  [facility]  [address] [facility] [address]   Phone [phone]   MICROSCOPIC URINALYSIS - Abnormal; Notable for the following components: MEDICAL DECISION MAKING / ED COURSE:      PROCEDURES:   Procedures    None    Patient was given:  Medications - No data to display    Patient complains of pelvic pain in pregnancy, she state that about one week ago she found out she was pregnant. She had some spotting pinkish red color around 11am. Her pelvic pain started around 4am, she states her LMP 21. She says the cramping comes and goes, rates the pain a 7-10. She is G-7, P-2, A-4 (1 , 1 tubal and 2 miscarries) she denies any abnormal vaginal bleeding or discharge, no urinary symptoms. After evaluation and examination the patient IV access, blood work, urinalysis and ultrasound was ordered. CBC shows no sepsis or anemia. Quantitative hCG is 883. 6. Metabolic panel shows hyperglycemia with a glucose of 315 however when I compare this to previous blood work, she trends to be in the 300s. Gap is 19 can improve with fluids as her kidney function is otherwise normal.  Liver function is normal.  She is Rh+. Urine shows some blood however, she also has sugar in her urine which appears to be chronic for her. However, at 1815 I gave face-to-face report to my attending physician to assume full care of patient secondary to the end of my shift, please see her documentation for further disposition and plan of care as the patient's ultrasound is still pending. The patient tolerated their visit well. I evaluated the patient. The physician was available for consultation as needed. The patient and / or the family were informed of the results of any tests, a time was given to answer questions, a plan was proposed and they agreed with plan. CLINICAL IMPRESSION:  1. Pregnancy test positive    2.  History of multiple miscarriages        DISPOSITION Decision To Discharge 10/25/2021 07:07:23 PM      PATIENT REFERRED TO:  Hema Hodges MD  [address] [facility]  [phone]    Schedule an appointment as soon as possible for a visit For follow up appointment, As needed    Elidia Jorge MD  [address] [place] [address]  [phone]    Call in 1 day  For follow up appointment ASAP      DISCHARGE MEDICATIONS:  Discharge Medication List as of 10/25/2021  8:15 PM          DISCONTINUED MEDICATIONS:  Discharge Medication List as of 10/25/2021  8:15 PM                 (Please note the MDM and HPI sections of this note were completed with a voice recognition program.  Efforts were made to edit the dictations but occasionally words are mis-transcribed.)    Electronically signed, ALIYA Reyes CNP,          ALIYA Reyes CNP  10/26/21 1453

## 2021-10-25 NOTE — ED TRIAGE NOTES
Pt arrived to [facility] via private vehicle. Pt c/o spotting after a positive pregnancy tet a week ago. Last menstrual 911-21. Pt reports lower abd cramping. G, with  2 miscarriages, 1 ectopic, 1 elective , and two live births. Pt awake, alert and oriented x 3. Skin warm and dry/normal color for ethnicity. Resp easy and unlabored. Pt placed in gown and on cardiac monitor. Call light in reach. Will continue to monitor.

## 2021-10-25 NOTE — ED NOTES
Report received from out going RN, pt resting in bed with daughter visiting at bedside. VS updated please see flow sheets for ful set. Pt updated on POC,  All needs and  questions addressed at this time.       Mohsen Robles RN  10/25/21 2000

## 2021-10-25 NOTE — ED PROVIDER NOTES
Date of evaluation: 10/25/2021    ED Attending Attestation Note     CHIEF COMPLAINT     I had a positive pregnancy test and I'm having pain  HISTORY OF PRESENT ILLNESS  (Location/Symptom, Timing/Onset,Context/Setting, Quality, Duration, Modifying Factors, Severity). Note limiting factors. This patient was seen by the advance practice provider. I have seen and examined the patient, agree with the workup, evaluation, management and diagnosis. The care plan has been discussed. Chief Complaint   Patient presents with    Pelvic Pain     + pregnancy a week ago, now having pelvic pain and spotting        Yary Ryan is a 39 y.o. female who presents to the emergency department secondary to concern for symptoms as noted above. Endorses some vaginal spotting, no discharge, pain in her pelvic area, not abdominal cramping. Had 8+ pregnancy test 1 week ago. History of a tubal and 2 miscarriages. G7, P2. Past medical history significant for asthma, hypertension, miscarriage multiple, morbid obesity, diabetes. Social History     Socioeconomic History    Marital status:      Spouse name: None    Number of children: None    Years of education: None    Highest education level: None   Occupational History    None   Tobacco Use    Smoking status: Never Smoker    Smokeless tobacco: Never Used   Substance and Sexual Activity    Alcohol use: No    Drug use: Not Currently     Types: Marijuana    Sexual activity: Yes     Partners: Male   Other Topics Concern    None   Social History Narrative    None     Social Determinants of Health     Financial Resource Strain: High Risk    Difficulty of Paying Living Expenses: Very hard   Food Insecurity: Food Insecurity Present    Worried About Running Out of Food in the Last Year: Sometimes true    Tayo of Food in the Last Year: Never true   Transportation Needs: No Transportation Needs    Lack of Transportation (Medical):  No    Lack of Transportation (Non-Medical): No   Physical Activity:     Days of Exercise per Week:     Minutes of Exercise per Session:    Stress:     Feeling of Stress :    Social Connections:     Frequency of Communication with Friends and Family:     Frequency of Social Gatherings with Friends and Family:     Attends Orthodox Services:     Active Member of Clubs or Organizations:     Attends Club or Organization Meetings:     Marital Status:    Intimate Partner Violence:     Fear of Current or Ex-Partner:     Emotionally Abused:     Physically Abused:     Sexually Abused:      Aside from what is stated above denies any other symptoms or modifying factors. Nursing Notes reviewed.       Past Surgical History:   Procedure Laterality Date    APPENDECTOMY  12/14/2018     Laparoscopic appendectomy     CHOLECYSTECTOMY, LAPAROSCOPIC  2/07    LAPAROSCOPIC APPENDECTOMY N/A 12/14/2018    LAPAROSCOPIC APPENDECTOMY performed by Brendan Rodriguez MD at [address] [place] [address]  07/18/2014    COLPOSCOPY OF VULVA, VAGINA AND CERVIX WITH CO-2 LASER       Family History   Problem Relation Age of Onset    High Blood Pressure Mother     High Blood Pressure Father     Asthma Father     Cancer Maternal Grandmother         breast    Breast Cancer Maternal Grandmother     Diabetes Paternal Grandmother        CURRENT MEDICATIONS       Previous Medications    ACETAMINOPHEN (APAP EXTRA STRENGTH) 500 MG TABLET    One tab 4 times    AMITRIPTYLINE (ELAVIL) 25 MG TABLET    TAKE ONE TABLET BY MOUTH ONCE NIGHTLY    AMITRIPTYLINE (ELAVIL) 25 MG TABLET    TAKE ONE TABLET BY MOUTH ONCE NIGHTLY *STOP CYMBALTA, STOP TRAZODONE*    AMLODIPINE (NORVASC) 10 MG TABLET    TAKE ONE TABLET BY MOUTH DAILY    ASPIRIN EC 81 MG EC TABLET    Take 1 tablet by mouth daily    DULOXETINE (CYMBALTA) 20 MG EXTENDED RELEASE CAPSULE    TAKE ONE CAPSULE BY MOUTH DAILY    GABAPENTIN (NEURONTIN) 600 MG TABLET    TAKE ONE TABLET BY MOUTH THREE TIMES A DAY (STOP GABAPENTIN 300MG)    GLIPIZIDE (GLUCOTROL) 10 MG TABLET    TAKE ONE TABLET BY MOUTH TWICE A DAY    INSULIN NPH ISOPHANE & REGULAR (HUMULIN 70/30 KWIKPEN) (70-30) 100 UNIT PER ML INJECTION PEN    30 units under skin AM before breakfast  And 10 units under skin PM before dinner    METFORMIN (GLUCOPHAGE) 500 MG TABLET    Take one tab twice a day    METOPROLOL SUCCINATE (TOPROL XL) 25 MG EXTENDED RELEASE TABLET    TAKE 1/2 TABLET BY MOUTH DAILY    ONDANSETRON (ZOFRAN) 4 MG TABLET    Take 1 tablet by mouth every 8 hours as needed for Nausea or Vomiting (prn nausea or vomiting)    TIZANIDINE (ZANAFLEX) 4 MG TABLET    Take 1 tablet by mouth every 8 hours as needed (muscle pain)      DIAGNOSTIC RESULTS     RADIOLOGY:   Interpretation per Radiologist below, if available at the time of this note:  US OB TRANSVAGINAL   Preliminary Result   No evidence of intrauterine pregnancy or ectopic pregnancy. Based on the   HCG, both remain possibilities. Consider follow-up HCG and/or ultrasound to   determine viability. Probable corpus luteum cyst on the right measuring 1.9 cm. Normal Doppler   flow to the right ovary. Nonvisualization of the left ovary. US OB LESS THAN 14 WEEKS SINGLE OR FIRST GESTATION   Preliminary Result   No evidence of intrauterine pregnancy or ectopic pregnancy. Based on the   HCG, both remain possibilities. Consider follow-up HCG and/or ultrasound to   determine viability. Probable corpus luteum cyst on the right measuring 1.9 cm. Normal Doppler   flow to the right ovary. Nonvisualization of the left ovary. Patient was given the following medications:  No orders of the defined types were placed in this encounter. INITIAL VITALS: BP: (!) 123/94, Temp: 98.1 °F (36.7 °C), Pulse: 135, Resp: 16, SpO2: 99 %   RECENT VITALS:  BP: (!) 142/80,Temp: 98.1 °F (36.7 °C), Pulse: 113, Resp: 18, SpO2: 100 %   Patient was seen initially by WENDIE, Michelle Timmons, please see her note.   At the time of signout the following is pending  -Ultrasound results  -Reassessment and disposition. Briefly, patient is a 80-year-old female, [address] [address] with a history of prior miscarriage x2 and . Presents with pelvic pain. Minimal spotting, no cramping, no other vaginal discharge. [facility]. Ultrasound results showed no evidence of IUP or ectopic pregnancy, both remain possibilities based on the hCG. My assessment reveals an overall well-appearing female who is sitting up in bed. Her abdomen is benign without rigidity or guarding. No vaginal bleeding. Reports her pain in her lower abdomen has resolved and she has minimal pain in her lower back. Did not take any medication before coming in, offered Tylenol, stated she would rather take it at home. Given her history of prior ectopic and miscarriages there is concern this could be a potential ectopic  pregnancy or miscarriage which we discussed. OB/GYN was consulted, spoke with Dr. Uma Blood, Who verified patient had been seen by Dr. Antione Dukes within the last year and recommended patient follow-up with her. She is in agreement with plan for patient to follow-up in the next 1 to 3 days and with strict return precautions. I discussed this with the patient, She expressed understanding, she had no new symptoms at that time. Her vitals remained hemodynamically stable. She was given information for follow-up and discharged home in stable condition. FINAL IMPRESSION      1. Pregnancy test positive    2.  History of multiple miscarriages        DISPOSITION/PLAN   DISPOSITION        PATIENT REFERRED TO:  Lucila Hearn MD  [facility]  [address]  [phone]    Schedule an appointment as soon as possible for a visit   For follow up appointment, As needed    Hever Shrestha MD  [address] [place] [address]  [phone]    Call in 1 day  For follow up appointment ASAP      DISCHARGE MEDICATIONS:  New Prescriptions    No medications on file            (Please note that portions of this note were completed with a voice recognition program. Efforts were made to edit the dictations but occasionally words are mis-transcribed.)    Whit Wolf MD (electronically signed)  Attending Emergency Physician        Whit Wolf MD  10/25/21 2007

## 2021-10-26 ASSESSMENT — ENCOUNTER SYMPTOMS
ABDOMINAL PAIN: 0
VOMITING: 0
COLOR CHANGE: 0
BACK PAIN: 0
NAUSEA: 0
DIARRHEA: 0
WHEEZING: 0
SHORTNESS OF BREATH: 0
COUGH: 0

## 2021-10-26 NOTE — ED NOTES
Pt discharged, follow up care and return precautions dicussed. Pt verbalized understanding. Pt ambulated out of the department with steady gait.       Chivo Fisher RN  10/25/21 2024

## 2021-11-02 ENCOUNTER — APPOINTMENT (OUTPATIENT)
Dept: ULTRASOUND IMAGING | Age: 36
DRG: 566 | End: 2021-11-02
Payer: MEDICARE

## 2021-11-02 ENCOUNTER — HOSPITAL ENCOUNTER (INPATIENT)
Age: 36
LOS: 2 days | Discharge: HOME | DRG: 566 | End: 2021-11-04
Attending: EMERGENCY MEDICINE | Admitting: STUDENT IN AN ORGANIZED HEALTH CARE EDUCATION/TRAINING PROGRAM
Payer: MEDICARE

## 2021-11-02 DIAGNOSIS — R94.31 ABNORMAL EKG: ICD-10-CM

## 2021-11-02 DIAGNOSIS — E10.10 DKA, TYPE 1, NOT AT GOAL (HCC): ICD-10-CM

## 2021-11-02 DIAGNOSIS — O46.90 VAGINAL BLEEDING IN PREGNANCY: ICD-10-CM

## 2021-11-02 DIAGNOSIS — I10 ESSENTIAL HYPERTENSION: ICD-10-CM

## 2021-11-02 DIAGNOSIS — E11.65 UNCONTROLLED TYPE 2 DIABETES MELLITUS WITH HYPERGLYCEMIA (HCC): ICD-10-CM

## 2021-11-02 DIAGNOSIS — O03.9 MISCARRIAGE: ICD-10-CM

## 2021-11-02 DIAGNOSIS — M25.561 CHRONIC PAIN OF RIGHT KNEE: ICD-10-CM

## 2021-11-02 DIAGNOSIS — E11.10 DIABETIC KETOACIDOSIS WITHOUT COMA ASSOCIATED WITH TYPE 2 DIABETES MELLITUS (HCC): ICD-10-CM

## 2021-11-02 DIAGNOSIS — R00.0 ELEVATED PULSE RATE: ICD-10-CM

## 2021-11-02 DIAGNOSIS — E11.42 DIABETIC POLYNEUROPATHY ASSOCIATED WITH TYPE 2 DIABETES MELLITUS (HCC): Primary | ICD-10-CM

## 2021-11-02 DIAGNOSIS — N93.9 VAGINAL BLEEDING: ICD-10-CM

## 2021-11-02 DIAGNOSIS — G89.29 CHRONIC PAIN OF RIGHT KNEE: ICD-10-CM

## 2021-11-02 DIAGNOSIS — N30.00 ACUTE CYSTITIS WITHOUT HEMATURIA: ICD-10-CM

## 2021-11-02 LAB
A/G RATIO: 1 (ref 1.1–2.2)
ABO/RH: NORMAL
ABO/RH: NORMAL
ALBUMIN SERPL-MCNC: 4.2 G/DL (ref 3.4–5)
ALP BLD-CCNC: 113 U/L (ref 40–129)
ALT SERPL-CCNC: 10 U/L (ref 10–40)
ANION GAP SERPL CALCULATED.3IONS-SCNC: 25 MMOL/L (ref 3–16)
ANTIBODY SCREEN: NORMAL
AST SERPL-CCNC: 13 U/L (ref 15–37)
BACTERIA: ABNORMAL /HPF
BASOPHILS ABSOLUTE: 0 K/UL (ref 0–0.2)
BASOPHILS RELATIVE PERCENT: 0.5 %
BETA-HYDROXYBUTYRATE: 1.82 MMOL/L (ref 0–0.27)
BILIRUB SERPL-MCNC: 0.3 MG/DL (ref 0–1)
BILIRUBIN URINE: NEGATIVE
BLOOD, URINE: ABNORMAL
BUN BLDV-MCNC: 12 MG/DL (ref 7–20)
CALCIUM SERPL-MCNC: 9.5 MG/DL (ref 8.3–10.6)
CHLORIDE BLD-SCNC: 96 MMOL/L (ref 99–110)
CLARITY: ABNORMAL
CO2: 13 MMOL/L (ref 21–32)
COLOR: YELLOW
CREAT SERPL-MCNC: 0.8 MG/DL (ref 0.6–1.1)
EOSINOPHILS ABSOLUTE: 0.1 K/UL (ref 0–0.6)
EOSINOPHILS RELATIVE PERCENT: 0.7 %
EPITHELIAL CELLS, UA: 7 /HPF (ref 0–5)
GFR AFRICAN AMERICAN: >60
GFR NON-AFRICAN AMERICAN: >60
GLUCOSE BLD-MCNC: 278 MG/DL (ref 70–99)
GLUCOSE BLD-MCNC: 371 MG/DL (ref 70–99)
GLUCOSE BLD-MCNC: 376 MG/DL (ref 70–99)
GLUCOSE BLD-MCNC: 439 MG/DL (ref 70–99)
GLUCOSE URINE: >=1000 MG/DL
GONADOTROPIN, CHORIONIC (HCG) QUANT: 1865 MIU/ML
HCT VFR BLD CALC: 47 % (ref 36–48)
HEMOGLOBIN: 15.1 G/DL (ref 12–16)
HYALINE CASTS: 1 /LPF (ref 0–8)
KETONES, URINE: >=80 MG/DL
LEUKOCYTE ESTERASE, URINE: NEGATIVE
LYMPHOCYTES ABSOLUTE: 2.7 K/UL (ref 1–5.1)
LYMPHOCYTES RELATIVE PERCENT: 38.6 %
MCH RBC QN AUTO: 28.3 PG (ref 26–34)
MCHC RBC AUTO-ENTMCNC: 32.1 G/DL (ref 31–36)
MCV RBC AUTO: 88.3 FL (ref 80–100)
MICROSCOPIC EXAMINATION: YES
MONOCYTES ABSOLUTE: 0.5 K/UL (ref 0–1.3)
MONOCYTES RELATIVE PERCENT: 6.8 %
NEUTROPHILS ABSOLUTE: 3.7 K/UL (ref 1.7–7.7)
NEUTROPHILS RELATIVE PERCENT: 53.4 %
NITRITE, URINE: NEGATIVE
PDW BLD-RTO: 14.9 % (ref 12.4–15.4)
PERFORMED ON: ABNORMAL
PH UA: 6 (ref 5–8)
PLATELET # BLD: 397 K/UL (ref 135–450)
PMV BLD AUTO: 8.1 FL (ref 5–10.5)
POTASSIUM REFLEX MAGNESIUM: 4.1 MMOL/L (ref 3.5–5.1)
PROTEIN UA: ABNORMAL MG/DL
RBC # BLD: 5.32 M/UL (ref 4–5.2)
RBC UA: 7 /HPF (ref 0–4)
SODIUM BLD-SCNC: 134 MMOL/L (ref 136–145)
SPECIFIC GRAVITY UA: >1.03 (ref 1–1.03)
TOTAL PROTEIN: 8.3 G/DL (ref 6.4–8.2)
URINE REFLEX TO CULTURE: YES
URINE TYPE: ABNORMAL
UROBILINOGEN, URINE: 0.2 E.U./DL
WBC # BLD: 6.9 K/UL (ref 4–11)
WBC UA: 14 /HPF (ref 0–5)

## 2021-11-02 PROCEDURE — 86850 RBC ANTIBODY SCREEN: CPT

## 2021-11-02 PROCEDURE — 81001 URINALYSIS AUTO W/SCOPE: CPT

## 2021-11-02 PROCEDURE — 76817 TRANSVAGINAL US OBSTETRIC: CPT

## 2021-11-02 PROCEDURE — 82010 KETONE BODYS QUAN: CPT

## 2021-11-02 PROCEDURE — 96372 THER/PROPH/DIAG INJ SC/IM: CPT

## 2021-11-02 PROCEDURE — 96375 TX/PRO/DX INJ NEW DRUG ADDON: CPT

## 2021-11-02 PROCEDURE — 86900 BLOOD TYPING SEROLOGIC ABO: CPT

## 2021-11-02 PROCEDURE — 86901 BLOOD TYPING SEROLOGIC RH(D): CPT

## 2021-11-02 PROCEDURE — 6370000000 HC RX 637 (ALT 250 FOR IP): Performed by: EMERGENCY MEDICINE

## 2021-11-02 PROCEDURE — 36415 COLL VENOUS BLD VENIPUNCTURE: CPT

## 2021-11-02 PROCEDURE — 85025 COMPLETE CBC W/AUTO DIFF WBC: CPT

## 2021-11-02 PROCEDURE — 99284 EMERGENCY DEPT VISIT MOD MDM: CPT

## 2021-11-02 PROCEDURE — 87086 URINE CULTURE/COLONY COUNT: CPT

## 2021-11-02 PROCEDURE — 2580000003 HC RX 258: Performed by: EMERGENCY MEDICINE

## 2021-11-02 PROCEDURE — 93005 ELECTROCARDIOGRAM TRACING: CPT | Performed by: EMERGENCY MEDICINE

## 2021-11-02 PROCEDURE — 76801 OB US < 14 WKS SINGLE FETUS: CPT

## 2021-11-02 PROCEDURE — 80053 COMPREHEN METABOLIC PANEL: CPT

## 2021-11-02 PROCEDURE — 2000000000 HC ICU R&B

## 2021-11-02 PROCEDURE — 6360000002 HC RX W HCPCS: Performed by: EMERGENCY MEDICINE

## 2021-11-02 PROCEDURE — 96365 THER/PROPH/DIAG IV INF INIT: CPT

## 2021-11-02 PROCEDURE — 84702 CHORIONIC GONADOTROPIN TEST: CPT

## 2021-11-02 RX ORDER — ACETAMINOPHEN 325 MG/1
650 TABLET ORAL EVERY 6 HOURS PRN
Status: DISCONTINUED | OUTPATIENT
Start: 2021-11-02 | End: 2021-11-04 | Stop reason: HOSPADM

## 2021-11-02 RX ORDER — ACETAMINOPHEN 500 MG
1000 TABLET ORAL ONCE
Status: COMPLETED | OUTPATIENT
Start: 2021-11-02 | End: 2021-11-02

## 2021-11-02 RX ORDER — SODIUM CHLORIDE 9 MG/ML
INJECTION, SOLUTION INTRAVENOUS CONTINUOUS
Status: DISCONTINUED | OUTPATIENT
Start: 2021-11-02 | End: 2021-11-03

## 2021-11-02 RX ORDER — SODIUM CHLORIDE 9 MG/ML
25 INJECTION, SOLUTION INTRAVENOUS PRN
Status: DISCONTINUED | OUTPATIENT
Start: 2021-11-02 | End: 2021-11-04 | Stop reason: HOSPADM

## 2021-11-02 RX ORDER — SODIUM CHLORIDE 0.9 % (FLUSH) 0.9 %
5-40 SYRINGE (ML) INJECTION PRN
Status: DISCONTINUED | OUTPATIENT
Start: 2021-11-02 | End: 2021-11-04 | Stop reason: HOSPADM

## 2021-11-02 RX ORDER — LABETALOL HYDROCHLORIDE 5 MG/ML
5 INJECTION, SOLUTION INTRAVENOUS EVERY 6 HOURS PRN
Status: DISCONTINUED | OUTPATIENT
Start: 2021-11-02 | End: 2021-11-04 | Stop reason: HOSPADM

## 2021-11-02 RX ORDER — POTASSIUM CHLORIDE 7.45 MG/ML
10 INJECTION INTRAVENOUS PRN
Status: DISCONTINUED | OUTPATIENT
Start: 2021-11-02 | End: 2021-11-04 | Stop reason: HOSPADM

## 2021-11-02 RX ORDER — LIDOCAINE HYDROCHLORIDE 10 MG/ML
5 INJECTION, SOLUTION EPIDURAL; INFILTRATION; INTRACAUDAL; PERINEURAL ONCE
Status: DISCONTINUED | OUTPATIENT
Start: 2021-11-02 | End: 2021-11-04 | Stop reason: HOSPADM

## 2021-11-02 RX ORDER — DEXTROSE MONOHYDRATE 25 G/50ML
12.5 INJECTION, SOLUTION INTRAVENOUS PRN
Status: DISCONTINUED | OUTPATIENT
Start: 2021-11-02 | End: 2021-11-04 | Stop reason: HOSPADM

## 2021-11-02 RX ORDER — MAGNESIUM SULFATE 1 G/100ML
1000 INJECTION INTRAVENOUS PRN
Status: DISCONTINUED | OUTPATIENT
Start: 2021-11-02 | End: 2021-11-04 | Stop reason: HOSPADM

## 2021-11-02 RX ORDER — SODIUM CHLORIDE, SODIUM LACTATE, POTASSIUM CHLORIDE, AND CALCIUM CHLORIDE .6; .31; .03; .02 G/100ML; G/100ML; G/100ML; G/100ML
1000 INJECTION, SOLUTION INTRAVENOUS ONCE
Status: COMPLETED | OUTPATIENT
Start: 2021-11-02 | End: 2021-11-02

## 2021-11-02 RX ORDER — DEXTROSE, SODIUM CHLORIDE, AND POTASSIUM CHLORIDE 5; .45; .15 G/100ML; G/100ML; G/100ML
INJECTION INTRAVENOUS CONTINUOUS PRN
Status: DISCONTINUED | OUTPATIENT
Start: 2021-11-02 | End: 2021-11-04 | Stop reason: ALTCHOICE

## 2021-11-02 RX ORDER — LORAZEPAM 2 MG/ML
2 INJECTION INTRAMUSCULAR ONCE
Status: COMPLETED | OUTPATIENT
Start: 2021-11-02 | End: 2021-11-02

## 2021-11-02 RX ORDER — SODIUM CHLORIDE 0.9 % (FLUSH) 0.9 %
5-40 SYRINGE (ML) INJECTION EVERY 12 HOURS SCHEDULED
Status: DISCONTINUED | OUTPATIENT
Start: 2021-11-02 | End: 2021-11-04 | Stop reason: HOSPADM

## 2021-11-02 RX ADMIN — INSULIN HUMAN 6 UNITS: 100 INJECTION, SOLUTION PARENTERAL at 20:22

## 2021-11-02 RX ADMIN — LORAZEPAM 2 MG: 2 INJECTION INTRAMUSCULAR; INTRAVENOUS at 16:58

## 2021-11-02 RX ADMIN — ACETAMINOPHEN 1000 MG: 500 TABLET ORAL at 18:28

## 2021-11-02 RX ADMIN — INSULIN HUMAN 6 UNITS/HR: 1 INJECTION, SOLUTION INTRAVENOUS at 20:54

## 2021-11-02 RX ADMIN — CEFTRIAXONE 1000 MG: 1 INJECTION, POWDER, FOR SOLUTION INTRAMUSCULAR; INTRAVENOUS at 20:28

## 2021-11-02 RX ADMIN — SODIUM CHLORIDE, POTASSIUM CHLORIDE, SODIUM LACTATE AND CALCIUM CHLORIDE 1000 ML: 600; 310; 30; 20 INJECTION, SOLUTION INTRAVENOUS at 20:55

## 2021-11-02 ASSESSMENT — PAIN SCALES - GENERAL
PAINLEVEL_OUTOF10: 0
PAINLEVEL_OUTOF10: 5
PAINLEVEL_OUTOF10: 0

## 2021-11-02 ASSESSMENT — ENCOUNTER SYMPTOMS
RESPIRATORY NEGATIVE: 1
VOMITING: 0
SHORTNESS OF BREATH: 0
ABDOMINAL DISTENTION: 0
NAUSEA: 0
SORE THROAT: 0
DIARRHEA: 0

## 2021-11-02 NOTE — LETTER
[address] [facility]  Phone: [phone]    No name on file. November 4, 2021     Patient: Lobito Alves   YOB: 1985   Date of Visit: 11/2/2021       To Whom It May Concern: It is my medical opinion that Lobito Alves may return to work on 11/5/21 without restrictions. Cassie was admitted to [facility] [place] on 11/2/21 and discharged on 11/4/21. If you have any questions or concerns, please don't hesitate to call. Sincerely,        No name on file.

## 2021-11-02 NOTE — ED PROVIDER NOTES
Constitutional: Negative. Negative for fever. HENT: Negative. Negative for sore throat. Respiratory: Negative. Negative for shortness of breath. Cardiovascular: Negative. Negative for chest pain and palpitations. Gastrointestinal: Negative for abdominal distention, diarrhea, nausea and vomiting. Genitourinary: Positive for vaginal bleeding. Musculoskeletal: Negative. Skin: Negative. Neurological: Negative. Negative for light-headedness and headaches. Hematological: Negative. Does not bruise/bleed easily. Except as noted above the remainder of the review of systems was reviewed and negative.        PAST MEDICAL HISTORY     Past Medical History:   Diagnosis Date    Asthma     Hypertension     Miscarriage     Morbid obesity ([facility] [address]     Type II or unspecified type diabetes mellitus without mention of complication, not stated as uncontrolled     Unspecified noninflammatory disorder of vulva and perineum          SURGICALHISTORY       Past Surgical History:   Procedure Laterality Date    APPENDECTOMY  12/14/2018     Laparoscopic appendectomy     CHOLECYSTECTOMY, LAPAROSCOPIC  2/07    LAPAROSCOPIC APPENDECTOMY N/A 12/14/2018    LAPAROSCOPIC APPENDECTOMY performed by Heather Perry MD at [address] [place] [address]  07/18/2014    COLPOSCOPY OF VULVA, VAGINA AND CERVIX WITH CO-2 LASER         CURRENT MEDICATIONS       Previous Medications    ACETAMINOPHEN (APAP EXTRA STRENGTH) 500 MG TABLET    One tab 4 times    AMITRIPTYLINE (ELAVIL) 25 MG TABLET    TAKE ONE TABLET BY MOUTH ONCE NIGHTLY    AMITRIPTYLINE (ELAVIL) 25 MG TABLET    TAKE ONE TABLET BY MOUTH ONCE NIGHTLY *STOP CYMBALTA, STOP TRAZODONE*    AMLODIPINE (NORVASC) 10 MG TABLET    TAKE ONE TABLET BY MOUTH DAILY    ASPIRIN EC 81 MG EC TABLET    Take 1 tablet by mouth daily    DULOXETINE (CYMBALTA) 20 MG EXTENDED RELEASE CAPSULE    TAKE ONE CAPSULE BY MOUTH DAILY    GABAPENTIN (NEURONTIN) 600 MG TABLET    TAKE ONE TABLET BY MOUTH THREE TIMES A DAY (STOP GABAPENTIN 300MG)    GLIPIZIDE (GLUCOTROL) 10 MG TABLET    TAKE ONE TABLET BY MOUTH TWICE A DAY    INSULIN NPH ISOPHANE & REGULAR (HUMULIN 70/30 KWIKPEN) (70-30) 100 UNIT PER ML INJECTION PEN    30 units under skin AM before breakfast  And 10 units under skin PM before dinner    METFORMIN (GLUCOPHAGE) 500 MG TABLET    Take one tab twice a day    METOPROLOL SUCCINATE (TOPROL XL) 25 MG EXTENDED RELEASE TABLET    TAKE 1/2 TABLET BY MOUTH DAILY    ONDANSETRON (ZOFRAN) 4 MG TABLET    Take 1 tablet by mouth every 8 hours as needed for Nausea or Vomiting (prn nausea or vomiting)    TIZANIDINE (ZANAFLEX) 4 MG TABLET    Take 1 tablet by mouth every 8 hours as needed (muscle pain)            Lisinopril and Augmentin [amoxicillin-pot clavulanate]    FAMILY HISTORY       Family History   Problem Relation Age of Onset    High Blood Pressure Mother     High Blood Pressure Father     Asthma Father     Cancer Maternal Grandmother         breast    Breast Cancer Maternal Grandmother     Diabetes Paternal Grandmother           SOCIAL HISTORY       Social History     Socioeconomic History    Marital status:      Spouse name: None    Number of children: None    Years of education: None    Highest education level: None   Occupational History    None   Tobacco Use    Smoking status: Never Smoker    Smokeless tobacco: Never Used   Substance and Sexual Activity    Alcohol use: No    Drug use: Not Currently     Types: Marijuana [place])    Sexual activity: Yes     Partners: Male   Other Topics Concern    None   Social History Narrative    None     Social Determinants of Health     Financial Resource Strain: High Risk    Difficulty of Paying Living Expenses: Very hard   Food Insecurity: Food Insecurity Present    Worried About Running Out of Food in the Last Year: Sometimes true    Tayo of Food in the Last Year: Never true   Transportation Needs: No Transportation Needs    Lack of Transportation (Medical): No    Lack of Transportation (Non-Medical): No   Physical Activity:     Days of Exercise per Week:     Minutes of Exercise per Session:    Stress:     Feeling of Stress :    Social Connections:     Frequency of Communication with Friends and Family:     Frequency of Social Gatherings with Friends and Family:     Attends Congregation Services:     Active Member of Clubs or Organizations:     Attends Club or Organization Meetings:     Marital Status:    Intimate Partner Violence:     Fear of Current or Ex-Partner:     Emotionally Abused:     Physically Abused:     Sexually Abused:        SCREENINGS             PHYSICAL EXAM    (up to 7 for level 4, 8 or more for level 5)     ED Triage Vitals [11/02/21 1558]   BP Temp Temp Source Pulse Resp SpO2 Height Weight   (!) 186/120 98.5 °F (36.9 °C) Temporal 140 16 99 % -- 297 lb 6.4 oz (134.9 kg)       Physical Exam  Vitals and nursing note reviewed. Constitutional:       General: She is not in acute distress. Appearance: She is not toxic-appearing or diaphoretic. HENT:      Head: Normocephalic and atraumatic. Right Ear: External ear normal.      Left Ear: External ear normal.      Mouth/Throat:      Mouth: Mucous membranes are dry. Eyes:      Extraocular Movements: Extraocular movements intact. Pupils: Pupils are equal, round, and reactive to light. Cardiovascular:      Rate and Rhythm: Regular rhythm. Tachycardia present. Heart sounds: Normal heart sounds. Pulmonary:      Effort: Pulmonary effort is normal.      Breath sounds: Normal breath sounds. Abdominal:      General: Abdomen is flat. Bowel sounds are normal. There is no distension. Palpations: Abdomen is soft. Tenderness: There is no abdominal tenderness. There is no guarding or rebound. Skin:     General: Skin is warm and dry. Capillary Refill: Capillary refill takes less than 2 seconds.    Neurological:      General: No focal deficit present. Mental Status: She is alert. Psychiatric:         Mood and Affect: Mood is anxious. Comments: Severe anxiety at this time patient is crying and screaming at the nurses and at myself when I walk in the room         RESULTS     EKG: All EKG's are interpreted by the Emergency Department Physician who either signs or Co-signsthis chart in the absence of a cardiologist.    EKG Interpretation    Interpreted by emergency department physician    Rhythm: sinus tachycardia  Rate: 110-120  Axis: normal  Ectopy: none  Conduction: normal  ST Segments: normal  T Waves: normal  Q Waves: none    Clinical Impression: sinus tachycardia unchanged from previous EKG November 10, 2017    Jazlyn Ritchie MD      RADIOLOGY:   Non-plain filmimages such as CT, Ultrasound and MRI are read by the radiologist.     Interpretation per the Radiologist below, if available at the time ofthis note:    US OB TRANSVAGINAL   Final Result   Questionable intrauterine gestational sac structure, correlating with 5 weeks   2 days. Fetal demise, anembryonic pregnancy, focal fluid or   pseudogestational sac of ectopic pregnancy are all differential possibilities. Continued imaging and clinical follow-up is recommended. US OB LESS THAN 14 WEEKS SINGLE OR FIRST GESTATION   Final Result   Questionable intrauterine gestational sac structure, correlating with 5 weeks   2 days. Fetal demise, anembryonic pregnancy, focal fluid or   pseudogestational sac of ectopic pregnancy are all differential possibilities. Continued imaging and clinical follow-up is recommended.                ED BEDSIDE ULTRASOUND:   Performed by ED Physician - none    LABS:  Labs Reviewed   CBC WITH AUTO DIFFERENTIAL - Abnormal; Notable for the following components:       Result Value    RBC 5.32 (*)     All other components within normal limits    Narrative:     Performed at:  [facility]  [address] [facility] [place]   Phone [phone]   COMPREHENSIVE METABOLIC PANEL W/ REFLEX TO MG FOR LOW K - Abnormal; Notable for the following components:    Sodium 134 (*)     Chloride 96 (*)     CO2 13 (*)     Anion Gap 25 (*)     Glucose 371 (*)     Total Protein 8.3 (*)     Albumin/Globulin Ratio 1.0 (*)     AST 13 (*)     All other components within normal limits    Narrative:     Cristy Ridley tel. [phone],  Chemistry results called to and read back by Marco Angel RN., 11/02/2021 18:28, by  Whitney Hamilton  Performed at:  [address] [facility] [place]   Phone [phone]   URINE RT REFLEX TO CULTURE - Abnormal; Notable for the following components:    Clarity, UA CLOUDY (*)     Glucose, Ur >=1000 (*)     Ketones, Urine >=80 (*)     Blood, Urine LARGE (*)     Protein, UA TRACE (*)     All other components within normal limits    Narrative:     Performed at:  [address] [facility] [place]   Phone [phone]   MICROSCOPIC URINALYSIS - Abnormal; Notable for the following components:    Bacteria, UA 1+ (*)     WBC, UA 14 (*)     RBC, UA 7 (*)     Epithelial Cells, UA 7 (*)     All other components within normal limits    Narrative:     Performed at:  [address] [facility] [place]   Phone [phone]   CULTURE, URINE   HCG, QUANTITATIVE, PREGNANCY    Narrative:     Performed at:  [address] [facility] [place]   Phone [phone]   TYPE AND SCREEN    Narrative:     Performed at:  [facility]  [address] [place] [address] [place]   Phone [phone]   ABO/RH    Narrative:     Performed at:  [facility]  [address] [place] [address] [place]   Phone [phone]       All other labs were within normal range or not returned as of this dictation. EMERGENCY DEPARTMENT COURSE and DIFFERENTIAL DIAGNOSIS/MDM:   Vitals:    Vitals:    11/02/21 1742 11/02/21 1801 11/02/21 1830 11/02/21 1905   BP:  (!) 166/100 (!) 164/99    Pulse: 140 135 120 105   Resp:  20 17 21   Temp:       TempSrc:       SpO2: 100% 99% 100%    Weight:           Patient was given thefollowing medications:  Medications   lactated ringers bolus (has no administration in time range)   insulin regular (HUMULIN R;NOVOLIN R) injection 6 Units (has no administration in time range)   insulin regular (MYXREDLIN) 100 units in sodium chloride 0.9 % 100 ml infusion (has no administration in time range)   cefTRIAXone (ROCEPHIN) 1000 mg IVPB in 50 mL D5W minibag (has no administration in time range)   LORazepam (ATIVAN) injection 2 mg (2 mg IntraMUSCular Given 11/2/21 1658)   acetaminophen (TYLENOL) tablet 1,000 mg (1,000 mg Oral Given 11/2/21 1828)       ED COURSE & MEDICAL DECISION MAKING    Pertinent Labs & Imaging studies reviewed. (See chart for details)   -  Patient seen and evaluated in the emergency department. -  Triage and nursing notes reviewed and incorporated. -  Old chart records reviewed and incorporated. -  Differential diagnosis includes: Differential diagnosis: Ectopic pregnancy, Molar pregnancy, Miscarriage, Hemorrhagic Shock, Rh incompatibility, UTI, other. 43-year-old female who presents with concern for miscarriage and/or ectopic pregnancy. Patient had positive pregnancy test at home as well as last time she was here but did not have definitive IUP. Patient had vaginal bleeding which has since stopped. Patient is extremely agitated and anxious at this time. Very tachycardic. Screaming and crying in the room. Hypertensive afebrile saturating well on room air. A.m. relatively unremarkable however patient requires multiple efforts to calm down. Will order blood tests as well as endovaginal ultrasound.       -  Work-up included:  See above  -  ED treatment included: See above  -  Results discussed with patient. REASSESSMENT     ED Course as of Nov 02 2003   [place] Nov 02, 2021   6789 Patient was extremely anxious and agitated. When she first arrived she was concerned that she was having a miscarriage but then she just got back from the bathroom and believes that she passed a large piece of tissue that she thinks looks like a fetus. Patient is screaming and crying in the room. Her heart rate is 140. She is inconsolable and was unable to have blood drawn or refused to have any other medication at this time. I sat and spoke with her for 10 to 15 minutes and believe that I slightly calmed her down. I assured her that we were here to help but that we needed to do the work-up as well as an ultrasound to be able to tell her whether she was actually having a miscarriage. Patient understood and was willing to work with us but she states that she is too scared right now. I offered her Ativan either by mouth or injection and she was okay with having an injection of Ativan. We will give her medication and will reevaluate.    [SC]   [phone] Patient states she is more calm but is still very nervous. Patient is still shaking in the bed and is tachycardic. Patient having some left shoulder and under her left breast.  Patient remains saturating well blood pressure slightly improved at this time. Blood work shows no anemia hCG quant is increasing but not at the rate that we would expect it to. Still need to obtain ultrasound at this time. [SC]   1838 BMP shows that patient is likely in DKA. CO2 13. Anion gap 25. Glucose is 371. Patient has ketones in her urine. Patient will still have pelvic ultrasound however patient will also require admission for DKA. We will continue with IV hydration.    [SC]   1925 Patient is back from ultrasound at this time. Heart rate continues to improve. Patient is tolerating p.o. fluids.   Patient is asking to drink soda I advised her that she was in DKA and she cannot do this. Patient is agreeable to drink further water and will continue with treatment for DKA at this time. Also shows signs of infection. Will give ceftriaxone.    [SC]      ED Course User Index  [SC] Sarthak Benito MD         CRITICAL CARE TIME   Total Critical Care time was 45 minutes, excluding separately reportable procedures. There was a high probability of clinically significant/life threatening deterioration in the patient's condition which required my urgent intervention. This includes multiple reevaluations, vital sign monitoring, pulse oximetry monitoring, telemetry monitoring, clinical response to the IV medications, reviewing the nursing notes, consultation time, dictation/documentation time, and interpretation of the labwork. (This time excludes time spent performing procedures). CONSULTS:  IP CONSULT TO HOSPITALIST    PROCEDURES:  Unless otherwise noted below, none     Procedures    FINAL IMPRESSION      1. Vaginal bleeding    2. Vaginal bleeding in pregnancy    3. Miscarriage    4. Diabetic ketoacidosis without coma associated with type 2 diabetes mellitus ([facility] [address]    5. Acute cystitis without hematuria          DISPOSITION/PLAN   DISPOSITION        PATIENT REFERREDTO:  No follow-up provider specified.     DISCHARGEMEDICATIONS:  New Prescriptions    No medications on file          (Please note that portions of this note were completed with a voice recognition program.  Efforts were made to edit the dictations but occasionally words are mis-transcribed.)    Sarthak Benito MD (electronically signed)  Attending Emergency Physician         Sarthak Benito MD  11/02/21 2003

## 2021-11-02 NOTE — ED NOTES
Pt agreeable to IM ativan at this time  Dr. Yasir Harrison remains at bedside      Kaylee Farley, [facility]  11/02/21 9021

## 2021-11-02 NOTE — ED NOTES
Pt very tearful and agitated, yelling and crying.   Pt does not want ativan at this time    Dr. Erlin Escalona at [address]  11/02/21 8467

## 2021-11-03 LAB
ANION GAP SERPL CALCULATED.3IONS-SCNC: 12 MMOL/L (ref 3–16)
ANION GAP SERPL CALCULATED.3IONS-SCNC: 13 MMOL/L (ref 3–16)
ANION GAP SERPL CALCULATED.3IONS-SCNC: 14 MMOL/L (ref 3–16)
ANION GAP SERPL CALCULATED.3IONS-SCNC: 9 MMOL/L (ref 3–16)
BASOPHILS ABSOLUTE: 0 K/UL (ref 0–0.2)
BASOPHILS RELATIVE PERCENT: 0.6 %
BUN BLDV-MCNC: 10 MG/DL (ref 7–20)
BUN BLDV-MCNC: 7 MG/DL (ref 7–20)
BUN BLDV-MCNC: 8 MG/DL (ref 7–20)
BUN BLDV-MCNC: 9 MG/DL (ref 7–20)
CALCIUM SERPL-MCNC: 7.5 MG/DL (ref 8.3–10.6)
CALCIUM SERPL-MCNC: 8.3 MG/DL (ref 8.3–10.6)
CALCIUM SERPL-MCNC: 8.5 MG/DL (ref 8.3–10.6)
CALCIUM SERPL-MCNC: 8.6 MG/DL (ref 8.3–10.6)
CHLORIDE BLD-SCNC: 100 MMOL/L (ref 99–110)
CHLORIDE BLD-SCNC: 101 MMOL/L (ref 99–110)
CHLORIDE BLD-SCNC: 96 MMOL/L (ref 99–110)
CHLORIDE BLD-SCNC: 99 MMOL/L (ref 99–110)
CO2: 19 MMOL/L (ref 21–32)
CO2: 20 MMOL/L (ref 21–32)
CREAT SERPL-MCNC: 0.5 MG/DL (ref 0.6–1.1)
CREAT SERPL-MCNC: 0.6 MG/DL (ref 0.6–1.1)
EKG ATRIAL RATE: 118 BPM
EKG ATRIAL RATE: 135 BPM
EKG DIAGNOSIS: NORMAL
EKG DIAGNOSIS: NORMAL
EKG P AXIS: 51 DEGREES
EKG P AXIS: 68 DEGREES
EKG P-R INTERVAL: 126 MS
EKG P-R INTERVAL: 138 MS
EKG Q-T INTERVAL: 298 MS
EKG Q-T INTERVAL: 324 MS
EKG QRS DURATION: 68 MS
EKG QRS DURATION: 76 MS
EKG QTC CALCULATION (BAZETT): 447 MS
EKG QTC CALCULATION (BAZETT): 454 MS
EKG R AXIS: 47 DEGREES
EKG R AXIS: 58 DEGREES
EKG T AXIS: 11 DEGREES
EKG T AXIS: 31 DEGREES
EKG VENTRICULAR RATE: 118 BPM
EKG VENTRICULAR RATE: 135 BPM
EOSINOPHILS ABSOLUTE: 0.1 K/UL (ref 0–0.6)
EOSINOPHILS RELATIVE PERCENT: 1.2 %
ESTIMATED AVERAGE GLUCOSE: 303.4 MG/DL
GFR AFRICAN AMERICAN: >60
GFR NON-AFRICAN AMERICAN: >60
GLUCOSE BLD-MCNC: 142 MG/DL (ref 70–99)
GLUCOSE BLD-MCNC: 144 MG/DL (ref 70–99)
GLUCOSE BLD-MCNC: 152 MG/DL (ref 70–99)
GLUCOSE BLD-MCNC: 153 MG/DL (ref 70–99)
GLUCOSE BLD-MCNC: 155 MG/DL (ref 70–99)
GLUCOSE BLD-MCNC: 156 MG/DL (ref 70–99)
GLUCOSE BLD-MCNC: 192 MG/DL (ref 70–99)
GLUCOSE BLD-MCNC: 211 MG/DL (ref 70–99)
GLUCOSE BLD-MCNC: 219 MG/DL (ref 70–99)
GLUCOSE BLD-MCNC: 222 MG/DL (ref 70–99)
GLUCOSE BLD-MCNC: 229 MG/DL (ref 70–99)
GLUCOSE BLD-MCNC: 232 MG/DL (ref 70–99)
GLUCOSE BLD-MCNC: 233 MG/DL (ref 70–99)
GLUCOSE BLD-MCNC: 253 MG/DL (ref 70–99)
GLUCOSE BLD-MCNC: 255 MG/DL (ref 70–99)
GLUCOSE BLD-MCNC: 271 MG/DL (ref 70–99)
GLUCOSE BLD-MCNC: 280 MG/DL (ref 70–99)
GLUCOSE BLD-MCNC: 310 MG/DL (ref 70–99)
GLUCOSE BLD-MCNC: 543 MG/DL (ref 70–99)
HBA1C MFR BLD: 12.2 %
HCT VFR BLD CALC: 33.4 % (ref 36–48)
HCT VFR BLD CALC: 33.6 % (ref 36–48)
HCT VFR BLD CALC: 34.8 % (ref 36–48)
HEMOGLOBIN: 10.8 G/DL (ref 12–16)
HEMOGLOBIN: 10.9 G/DL (ref 12–16)
HEMOGLOBIN: 11.6 G/DL (ref 12–16)
INR BLD: 0.99 (ref 0.88–1.12)
LYMPHOCYTES ABSOLUTE: 3.1 K/UL (ref 1–5.1)
LYMPHOCYTES RELATIVE PERCENT: 52.5 %
MAGNESIUM: 1.5 MG/DL (ref 1.8–2.4)
MAGNESIUM: 1.7 MG/DL (ref 1.8–2.4)
MAGNESIUM: 1.7 MG/DL (ref 1.8–2.4)
MAGNESIUM: 2.1 MG/DL (ref 1.8–2.4)
MCH RBC QN AUTO: 28.5 PG (ref 26–34)
MCHC RBC AUTO-ENTMCNC: 32.3 G/DL (ref 31–36)
MCV RBC AUTO: 88.4 FL (ref 80–100)
MONOCYTES ABSOLUTE: 0.5 K/UL (ref 0–1.3)
MONOCYTES RELATIVE PERCENT: 7.6 %
NEUTROPHILS ABSOLUTE: 2.3 K/UL (ref 1.7–7.7)
NEUTROPHILS RELATIVE PERCENT: 38.1 %
PDW BLD-RTO: 14.9 % (ref 12.4–15.4)
PERFORMED ON: ABNORMAL
PHOSPHORUS: 2.2 MG/DL (ref 2.5–4.9)
PHOSPHORUS: 2.7 MG/DL (ref 2.5–4.9)
PHOSPHORUS: 2.8 MG/DL (ref 2.5–4.9)
PHOSPHORUS: 3.1 MG/DL (ref 2.5–4.9)
PLATELET # BLD: 274 K/UL (ref 135–450)
PMV BLD AUTO: 7.9 FL (ref 5–10.5)
POTASSIUM SERPL-SCNC: 3.5 MMOL/L (ref 3.5–5.1)
POTASSIUM SERPL-SCNC: 3.8 MMOL/L (ref 3.5–5.1)
POTASSIUM SERPL-SCNC: 3.8 MMOL/L (ref 3.5–5.1)
POTASSIUM SERPL-SCNC: 5.4 MMOL/L (ref 3.5–5.1)
PROTHROMBIN TIME: 11.2 SEC (ref 9.9–12.7)
RBC # BLD: 3.8 M/UL (ref 4–5.2)
SODIUM BLD-SCNC: 129 MMOL/L (ref 136–145)
SODIUM BLD-SCNC: 129 MMOL/L (ref 136–145)
SODIUM BLD-SCNC: 132 MMOL/L (ref 136–145)
SODIUM BLD-SCNC: 133 MMOL/L (ref 136–145)
URINE CULTURE, ROUTINE: NORMAL
WBC # BLD: 5.9 K/UL (ref 4–11)

## 2021-11-03 PROCEDURE — 85018 HEMOGLOBIN: CPT

## 2021-11-03 PROCEDURE — 85014 HEMATOCRIT: CPT

## 2021-11-03 PROCEDURE — 6360000002 HC RX W HCPCS: Performed by: STUDENT IN AN ORGANIZED HEALTH CARE EDUCATION/TRAINING PROGRAM

## 2021-11-03 PROCEDURE — 02HV33Z INSERTION OF INFUSION DEVICE INTO SUPERIOR VENA CAVA, PERCUTANEOUS APPROACH: ICD-10-PCS | Performed by: STUDENT IN AN ORGANIZED HEALTH CARE EDUCATION/TRAINING PROGRAM

## 2021-11-03 PROCEDURE — 80048 BASIC METABOLIC PNL TOTAL CA: CPT

## 2021-11-03 PROCEDURE — 6360000002 HC RX W HCPCS: Performed by: HOSPITALIST

## 2021-11-03 PROCEDURE — C1751 CATH, INF, PER/CENT/MIDLINE: HCPCS

## 2021-11-03 PROCEDURE — 94761 N-INVAS EAR/PLS OXIMETRY MLT: CPT

## 2021-11-03 PROCEDURE — 84100 ASSAY OF PHOSPHORUS: CPT

## 2021-11-03 PROCEDURE — 36569 INSJ PICC 5 YR+ W/O IMAGING: CPT

## 2021-11-03 PROCEDURE — 2500000003 HC RX 250 WO HCPCS: Performed by: STUDENT IN AN ORGANIZED HEALTH CARE EDUCATION/TRAINING PROGRAM

## 2021-11-03 PROCEDURE — 76937 US GUIDE VASCULAR ACCESS: CPT

## 2021-11-03 PROCEDURE — 6370000000 HC RX 637 (ALT 250 FOR IP): Performed by: STUDENT IN AN ORGANIZED HEALTH CARE EDUCATION/TRAINING PROGRAM

## 2021-11-03 PROCEDURE — 6370000000 HC RX 637 (ALT 250 FOR IP): Performed by: HOSPITALIST

## 2021-11-03 PROCEDURE — 93010 ELECTROCARDIOGRAM REPORT: CPT | Performed by: INTERNAL MEDICINE

## 2021-11-03 PROCEDURE — 2580000003 HC RX 258: Performed by: HOSPITALIST

## 2021-11-03 PROCEDURE — 83735 ASSAY OF MAGNESIUM: CPT

## 2021-11-03 PROCEDURE — 99255 IP/OBS CONSLTJ NEW/EST HI 80: CPT | Performed by: OBSTETRICS & GYNECOLOGY

## 2021-11-03 PROCEDURE — 85610 PROTHROMBIN TIME: CPT

## 2021-11-03 PROCEDURE — 83036 HEMOGLOBIN GLYCOSYLATED A1C: CPT

## 2021-11-03 PROCEDURE — 2580000003 HC RX 258: Performed by: STUDENT IN AN ORGANIZED HEALTH CARE EDUCATION/TRAINING PROGRAM

## 2021-11-03 PROCEDURE — 85025 COMPLETE CBC W/AUTO DIFF WBC: CPT

## 2021-11-03 PROCEDURE — 2500000003 HC RX 250 WO HCPCS: Performed by: HOSPITALIST

## 2021-11-03 PROCEDURE — 2000000000 HC ICU R&B

## 2021-11-03 RX ORDER — DEXTROSE MONOHYDRATE 25 G/50ML
12.5 INJECTION, SOLUTION INTRAVENOUS PRN
Status: DISCONTINUED | OUTPATIENT
Start: 2021-11-03 | End: 2021-11-04 | Stop reason: HOSPADM

## 2021-11-03 RX ORDER — DULOXETIN HYDROCHLORIDE 20 MG/1
20 CAPSULE, DELAYED RELEASE ORAL DAILY
Status: DISCONTINUED | OUTPATIENT
Start: 2021-11-03 | End: 2021-11-04 | Stop reason: HOSPADM

## 2021-11-03 RX ORDER — NICOTINE POLACRILEX 4 MG
15 LOZENGE BUCCAL PRN
Status: DISCONTINUED | OUTPATIENT
Start: 2021-11-03 | End: 2021-11-04 | Stop reason: HOSPADM

## 2021-11-03 RX ORDER — CALCIUM GLUCONATE 20 MG/ML
1000 INJECTION, SOLUTION INTRAVENOUS ONCE
Status: COMPLETED | OUTPATIENT
Start: 2021-11-03 | End: 2021-11-03

## 2021-11-03 RX ORDER — DEXTROSE MONOHYDRATE 50 MG/ML
100 INJECTION, SOLUTION INTRAVENOUS PRN
Status: DISCONTINUED | OUTPATIENT
Start: 2021-11-03 | End: 2021-11-04 | Stop reason: HOSPADM

## 2021-11-03 RX ORDER — DEXTROSE AND SODIUM CHLORIDE 5; .45 G/100ML; G/100ML
INJECTION, SOLUTION INTRAVENOUS CONTINUOUS
Status: DISCONTINUED | OUTPATIENT
Start: 2021-11-03 | End: 2021-11-03

## 2021-11-03 RX ORDER — GABAPENTIN 300 MG/1
600 CAPSULE ORAL 3 TIMES DAILY
Status: DISCONTINUED | OUTPATIENT
Start: 2021-11-03 | End: 2021-11-04 | Stop reason: HOSPADM

## 2021-11-03 RX ORDER — TIZANIDINE 4 MG/1
4 TABLET ORAL EVERY 8 HOURS PRN
Status: DISCONTINUED | OUTPATIENT
Start: 2021-11-03 | End: 2021-11-04 | Stop reason: HOSPADM

## 2021-11-03 RX ADMIN — SODIUM CHLORIDE: 9 INJECTION, SOLUTION INTRAVENOUS at 02:24

## 2021-11-03 RX ADMIN — POTASSIUM PHOSPHATE, MONOBASIC POTASSIUM PHOSPHATE, DIBASIC 10 MMOL: 224; 236 INJECTION, SOLUTION, CONCENTRATE INTRAVENOUS at 12:45

## 2021-11-03 RX ADMIN — INSULIN HUMAN 21 UNITS: 100 INJECTION, SUSPENSION SUBCUTANEOUS at 12:37

## 2021-11-03 RX ADMIN — GABAPENTIN 600 MG: 300 CAPSULE ORAL at 21:10

## 2021-11-03 RX ADMIN — INSULIN HUMAN 21 UNITS: 100 INJECTION, SUSPENSION SUBCUTANEOUS at 17:24

## 2021-11-03 RX ADMIN — GABAPENTIN 600 MG: 300 CAPSULE ORAL at 10:29

## 2021-11-03 RX ADMIN — INSULIN LISPRO 3 UNITS: 100 INJECTION, SOLUTION INTRAVENOUS; SUBCUTANEOUS at 21:10

## 2021-11-03 RX ADMIN — SODIUM CHLORIDE, PRESERVATIVE FREE 10 ML: 5 INJECTION INTRAVENOUS at 08:40

## 2021-11-03 RX ADMIN — ACETAMINOPHEN 650 MG: 325 TABLET ORAL at 08:38

## 2021-11-03 RX ADMIN — CEFTRIAXONE 1000 MG: 1 INJECTION, POWDER, FOR SOLUTION INTRAMUSCULAR; INTRAVENOUS at 13:56

## 2021-11-03 RX ADMIN — ACETAMINOPHEN 650 MG: 325 TABLET ORAL at 18:44

## 2021-11-03 RX ADMIN — SODIUM CHLORIDE, PRESERVATIVE FREE 10 ML: 5 INJECTION INTRAVENOUS at 21:00

## 2021-11-03 RX ADMIN — GABAPENTIN 600 MG: 300 CAPSULE ORAL at 13:45

## 2021-11-03 RX ADMIN — CALCIUM GLUCONATE 1000 MG: 20 INJECTION, SOLUTION INTRAVENOUS at 07:03

## 2021-11-03 RX ADMIN — MAGNESIUM SULFATE HEPTAHYDRATE 1000 MG: 1 INJECTION, SOLUTION INTRAVENOUS at 08:59

## 2021-11-03 RX ADMIN — MAGNESIUM SULFATE HEPTAHYDRATE 1000 MG: 1 INJECTION, SOLUTION INTRAVENOUS at 11:01

## 2021-11-03 RX ADMIN — DEXTROSE AND SODIUM CHLORIDE: 5; 450 INJECTION, SOLUTION INTRAVENOUS at 07:00

## 2021-11-03 RX ADMIN — INSULIN HUMAN 6.6 UNITS/HR: 1 INJECTION, SOLUTION INTRAVENOUS at 02:28

## 2021-11-03 RX ADMIN — TIZANIDINE 4 MG: 4 TABLET ORAL at 10:29

## 2021-11-03 RX ADMIN — INSULIN LISPRO 2 UNITS: 100 INJECTION, SOLUTION INTRAVENOUS; SUBCUTANEOUS at 13:40

## 2021-11-03 RX ADMIN — DULOXETINE HYDROCHLORIDE 20 MG: 20 CAPSULE, DELAYED RELEASE ORAL at 10:29

## 2021-11-03 RX ADMIN — INSULIN LISPRO 3 UNITS: 100 INJECTION, SOLUTION INTRAVENOUS; SUBCUTANEOUS at 17:21

## 2021-11-03 RX ADMIN — INSULIN LISPRO 1 UNITS: 100 INJECTION, SOLUTION INTRAVENOUS; SUBCUTANEOUS at 21:10

## 2021-11-03 RX ADMIN — TIZANIDINE 4 MG: 4 TABLET ORAL at 18:44

## 2021-11-03 RX ADMIN — POTASSIUM CHLORIDE, DEXTROSE MONOHYDRATE AND SODIUM CHLORIDE: 150; 5; 450 INJECTION, SOLUTION INTRAVENOUS at 03:22

## 2021-11-03 ASSESSMENT — PAIN DESCRIPTION - PROGRESSION
CLINICAL_PROGRESSION: NOT CHANGED
CLINICAL_PROGRESSION: NOT CHANGED

## 2021-11-03 ASSESSMENT — PAIN DESCRIPTION - FREQUENCY
FREQUENCY: INTERMITTENT
FREQUENCY: INTERMITTENT

## 2021-11-03 ASSESSMENT — PAIN SCALES - GENERAL
PAINLEVEL_OUTOF10: 3
PAINLEVEL_OUTOF10: 0
PAINLEVEL_OUTOF10: 0
PAINLEVEL_OUTOF10: 3
PAINLEVEL_OUTOF10: 0
PAINLEVEL_OUTOF10: 0

## 2021-11-03 ASSESSMENT — PAIN - FUNCTIONAL ASSESSMENT
PAIN_FUNCTIONAL_ASSESSMENT: PREVENTS OR INTERFERES SOME ACTIVE ACTIVITIES AND ADLS
PAIN_FUNCTIONAL_ASSESSMENT: PREVENTS OR INTERFERES SOME ACTIVE ACTIVITIES AND ADLS

## 2021-11-03 ASSESSMENT — PAIN DESCRIPTION - DESCRIPTORS
DESCRIPTORS: HEADACHE
DESCRIPTORS: HEADACHE

## 2021-11-03 ASSESSMENT — PAIN DESCRIPTION - LOCATION
LOCATION: HEAD
LOCATION: HEAD

## 2021-11-03 ASSESSMENT — PAIN DESCRIPTION - ONSET
ONSET: PROGRESSIVE
ONSET: PROGRESSIVE

## 2021-11-03 NOTE — PROGRESS NOTES
Hospitalist Progress Note      PCP: Laura Recinos MD    Date of Admission: 11/2/2021    Chief Complaint: Vaginal bleeding    Hospital Course: 36f admitted with vaginal bleeding after noticing spotting last week. Was found to be in DKA     Subjective: Complains of suprapubic pain, denies n/v      Medications:  Reviewed    Infusion Medications    dextrose      dextrose 5% and 0.45% NaCl with KCl 20 mEq 150 mL/hr at 11/03/21 0322    sodium chloride       Scheduled Medications    DULoxetine  20 mg Oral Daily    gabapentin  600 mg Oral TID    insulin NPH  21 Units SubCUTAneous BID AC    insulin lispro  0-6 Units SubCUTAneous TID WC    insulin lispro  0-3 Units SubCUTAneous Nightly    potassium phosphate IVPB  10 mmol IntraVENous Once    enoxaparin  40 mg SubCUTAneous Daily    lidocaine 1 % injection  5 mL IntraDERmal Once    sodium chloride flush  5-40 mL IntraVENous 2 times per day     PRN Meds: glucose, dextrose, glucagon (rDNA), dextrose, tiZANidine, dextrose, potassium chloride, magnesium sulfate, sodium phosphate IVPB **OR** sodium phosphate IVPB **OR** sodium phosphate IVPB, dextrose 5% and 0.45% NaCl with KCl 20 mEq, labetalol, acetaminophen, sodium chloride flush, sodium chloride      Intake/Output Summary (Last 24 hours) at 11/3/2021 1322  Last data filed at 11/3/2021 1252  Gross per 24 hour   Intake --   Output 550 ml   Net -550 ml       Physical Exam Performed:    /72   Pulse 84   Temp 97.9 °F (36.6 °C) (Oral)   Resp 13   Ht 5' 8\" (1.727 m)   Wt 297 lb 9.9 oz (135 kg)   LMP 09/11/2021 (Approximate)   SpO2 100%   BMI 45.25 kg/m²     General appearance: No apparent distress, appears stated age and cooperative. HEENT: Pupils equal, round, and reactive to light. Conjunctivae/corneas clear. Neck: Supple, with full range of motion. No jugular venous distention. Trachea midline. Respiratory:  Normal respiratory effort.  No use of accessory muscle, no intercostal Date Noted    DKA (diabetic ketoacidosis) ([facility] [address] [E11.10] 11/02/2021    Miscarriage [O03.9] 11/02/2021    DKA, type 1, not at goal [facility]) [E10.10] 11/02/2021    Diabetic neuropathy associated with type 2 diabetes mellitus ([facility] [address] [E11.40] 02/02/2021    Hypertension [I10]      1) DKA  - AG closed, bicarb 20  - NPH, dc Insulin drip  - continue nph bid, corrective ISS. Denies prior episode of dka    2) Questionable gestational sac  - OB consulted  - patient expressing concern as she was told in 2018 she had blighted ovum diagnosed 2018 that went full term     3) Anemia  - trend, tele        DVT Prophylaxis: lovenox  Diet: ADULT DIET;  Regular; 3 carb choices (45 gm/meal)  Code Status: Full Code         Morris Lake MD

## 2021-11-03 NOTE — CARE COORDINATION
Lisinopril      cough    Augmentin [Amoxicillin-Pot Clavulanate] Rash       MEDICATIONS     DULoxetine  20 mg Oral Daily    gabapentin  600 mg Oral TID    insulin NPH  21 Units SubCUTAneous BID AC    insulin lispro  0-6 Units SubCUTAneous TID WC    insulin lispro  0-3 Units SubCUTAneous Nightly    potassium phosphate IVPB  10 mmol IntraVENous Once    cefTRIAXone (ROCEPHIN) IV  1,000 mg IntraVENous Q24H    enoxaparin  40 mg SubCUTAneous Daily    lidocaine 1 % injection  5 mL IntraDERmal Once    sodium chloride flush  5-40 mL IntraVENous 2 times per day       Objective        Patient Active Problem List   Diagnosis Code    Hypertension I10    Asthma J45.909    Morbid obesity ([facility] E66.01    Appendicitis, acute K35.80    Acute appendicitis K35.80    Diabetic neuropathy associated with type 2 diabetes mellitus ([facility] E11.40    DKA (diabetic ketoacidosis) ([facility]) E11.10    Miscarriage O03.9    DKA, type 1, not at goal ([facility] E10.10        BP (!) 140/80   Pulse 86   Temp 97.9 °F (36.6 °C) (Oral)   Resp 19   Ht 5' 8\" (1.727 m)   Wt 297 lb 9.9 oz (135 kg)   LMP 09/11/2021 (Approximate)   SpO2 100%   BMI 45.25 kg/m²     HgBA1c:    Lab Results   Component Value Date    LABA1C 12.2 11/03/2021       Recent Labs     11/03/21  1107 11/03/21  1154 11/03/21  1254 11/03/21  1340   POCGLU 233* 219* 152* 222*       BUN/Creatinine:    Lab Results   Component Value Date    BUN 7 11/03/2021    CREATININE 0.5 11/03/2021       Assessment        Diabetes Management and Education    Does the patient have a Primary Care Physician? No     Does the patient require new medication instruction? Yes, TBD. Reports diarrhea with Metformin. Previous experience with Insulin. Reviewed current plan.      Person responsible for administration of Insulin/Medication:        [x] Self     [] Caregiver       [] Spouse       [] Other Family Member   []  Other      Level of patient/caregiver understanding able to:       [] Verbalized insulin pens and 4mm pen needles and glucometer/strips/lancets       Teaching Time Diabetes Education:  30 minutes     Electronically signed by Sharmila Aguilar on 11/3/2021 at 4:15 PM

## 2021-11-03 NOTE — CARE COORDINATION
Dropped off PCP list & Crista Atwood information. Pt asleep so left at bedside, nurse aware.     Baldo Carter RN, BSN,   [phone]  Electronically signed by Baldo Carter RN on 11/3/2021 at 11:44 AM

## 2021-11-03 NOTE — CONSULTS
Consults   Department of Gynecology History and Physical      CHIEF COMPLAINT: Abnormal vaginal bleeding    History obtained from patient    HISTORY OF PRESENT ILLNESS:    The patient is a 39 y.o. [id number] female with significant past medical history of diabetes who presents with DKA and poorly controlled diabetes. HB A1C 12.2. She had a quant in the ER on 10/25 883 and repeat quant  1865. She is having some spotting. Previously saw Blair Vee. She's interested in having btl. 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 Last menstrual period:    2. Menses: n/a  3. Contraception: n/a  4. Sexually transmitted disease history: h/o trich, chlamydia  5.  Number of sexual partners in the last 6 months: 1    Past Ob History  2 term   1 ectopic pregnancy treated with mtx  1   2 mab  Current pregnancy    Meds:  Current Facility-Administered Medications:     glucose (GLUTOSE) 40 % oral gel 15 g, 15 g, Oral, PRN, John M Jose, DO    dextrose 50 % IV solution, 12.5 g, IntraVENous, PRN, John M Jose, DO    glucagon (rDNA) injection 1 mg, 1 mg, IntraMUSCular, PRN, John M Jose, DO    dextrose 5 % solution, 100 mL/hr, IntraVENous, PRN, John M Jose, DO    DULoxetine (CYMBALTA) extended release capsule 20 mg, 20 mg, Oral, Daily, Day ROSENBERG Dwight Crawford MD, 20 mg at 11/03/21 1029    gabapentin (NEURONTIN) capsule 600 mg, 600 mg, Oral, TID, Susie Lopez MD, 600 mg at 11/03/21 1345    tiZANidine (ZANAFLEX) tablet 4 mg, 4 mg, Oral, Q8H PRN, Susie Lopez MD, 4 mg at 11/03/21 1029    insulin NPH (HUMULIN N;NOVOLIN N) injection vial 21 Units, 21 Units, SubCUTAneous, BID AC, Susie Lopez MD, 21 Units at 11/03/21 1237    insulin lispro (HUMALOG) injection vial 0-6 Units, 0-6 Units, SubCUTAneous, TID WC, Susie Lopez MD, 2 Units at 11/03/21 1340    insulin lispro (HUMALOG) injection vial 0-3 Units, 0-3 Units, SubCUTAneous, Nightly, Susie Lopez MD    cefTRIAXone (ROCEPHIN) 1000 mg IVPB in 50 mL D5W minibag, 1,000 mg, IntraVENous, Q24H, Susie Lopez MD, Stopped at 11/03/21 1427    dextrose 50 % IV solution, 12.5 g, IntraVENous, PRN, John NALINI Garcia, DO    potassium chloride 10 mEq/100 mL IVPB (Peripheral Line), 10 mEq, IntraVENous, PRN, John NALINI Garcia, DO    magnesium sulfate 1000 mg in dextrose 5% 100 mL IVPB, 1,000 mg, IntraVENous, PRN, John NALINI Garcia, DO, Stopped at 11/03/21 1223    sodium phosphate 10 mmol in dextrose 5 % 250 mL IVPB, 10 mmol, IntraVENous, PRN **OR** sodium phosphate 15 mmol in dextrose 5 % 250 mL IVPB, 15 mmol, IntraVENous, PRN **OR** sodium phosphate 20 mmol in dextrose 5 % 500 mL IVPB, 20 mmol, IntraVENous, PRN, John NALINI Siegelni, DO    enoxaparin (LOVENOX) injection 40 mg, 40 mg, SubCUTAneous, Daily, John Garcia, DO    dextrose 5 % and 0.45 % NaCl with KCl 20 mEq infusion, , IntraVENous, Continuous PRN, John Garcia, DO, Last Rate: 150 mL/hr at 11/03/21 0322, New Bag at 11/03/21 0322    labetalol (NORMODYNE;TRANDATE) injection 5 mg, 5 mg, IntraVENous, Q6H PRN, John NALINI Garcia DO    acetaminophen (TYLENOL) tablet 650 mg, 650 mg, Oral, Q6H PRN, John Garcia DO, 650 mg at 11/03/21 0838    lidocaine PF 1 % injection 5 mL, 5 mL, IntraDERmal, Once, John Garcia DO    sodium chloride flush 0.9 % injection 5-40 mL, 5-40 mL, IntraVENous, 2 times per day, John M Jose, DO, 10 mL at 11/03/21 0840    sodium chloride flush 0.9 % injection 5-40 mL, 5-40 mL, IntraVENous, PRN, John M Jose, DO    0.9 % sodium chloride infusion, 25 mL, IntraVENous, PRN, John M Jose, DO     Allergies:  Lisinopril and Augmentin [amoxicillin-pot clavulanate]     Social History:  TOBACCO:  denies  ETOH: denies  DRUGS:  denies    Family History:       Problem Relation Age of Onset    High Blood Pressure Mother     High Blood Pressure Father     Asthma Father     Cancer Maternal Grandmother         breast    Breast Cancer Maternal Grandmother     Diabetes Paternal Grandmother        ROS:     General ROS: negative  Respiratory ROS: no cough, SOB or wheezing  Cardiovascular ROS: no chest pain or dyspnea on exertion  Gastrointestinal ROS: no abdominal pain, change in bowel habits, or black or bloody stools  Genito-Urinary ROS: positive for - change in menstrual cycle  no dysuria, trouble of voiding or hematuria  Musculoskeletal ROS: negative    PHYSICAL EXAM:    Vitals:  BP (!) 140/80   Pulse 86   Temp 97.9 °F (36.6 °C) (Oral)   Resp 19   Ht 5' 8\" (1.727 m)   Wt 297 lb 9.9 oz (135 kg)   LMP 09/11/2021 (Approximate)   SpO2 100%   BMI 45.25 kg/m²     CONSTITUTIONAL:  awake, alert, cooperative, no apparent distress, and appears stated age  NECK:  Supple, symmetrical, trachea midline, no adenopathy, thyroid symmetric, not enlarged and no tenderness, skin normal  HEMATOLOGIC/LYMPHATICS:  no cervical lymphadenopathy  BACK:  symmetric  LUNGS:  No increased work of breathing, good air exchange  CARDIOVASCULAR: No periferal edema  ABDOMEN:  Soft, non-distended, non-tender, no masses palpated, no hepatosplenomegally  CHEST/BREASTS:  Breasts symmetrical, skin without lesion(s), no nipple retraction or dimpling, no nipple discharge, no masses palpated, no axillary or supraclavicular adenopathy  MUSCULOSKELETAL:  tone is normal  NEUROLOGIC:  Awake, alert, oriented to name, place and time.     SKIN:  normal skin color, texture, turgor  External Genitalia: def  Urethral Meatus: def  Urethra: def  Bladder:  def  Vagina: def  Cervix: def  Uterus:  def  Adenexa: def    Labs:    CBC:   Lab Results   Component Value Date    WBC 5.9 11/03/2021    RBC 3.80 11/03/2021    HGB 10.9 11/03/2021    HCT 33.4 11/03/2021    MCV 88.4 11/03/2021    RDW 14.9 11/03/2021     11/03/2021     BMP:    Lab Results   Component Value Date     11/03/2021    K 3.5 11/03/2021    K 4.1 11/02/2021    CL 99 11/03/2021    CO2 20 11/03/2021    BUN 7 11/03/2021     HFP:    Lab Results   Component Value Date    PROT 8.3 11/02/2021    PROT 7.2 02/28/2013     CMP:    Lab Results   Component Value Date     11/03/2021    K 3.5 11/03/2021    K 4.1 11/02/2021    CL 99 11/03/2021    CO2 20 11/03/2021    BUN 7 11/03/2021    PROT 8.3 11/02/2021    PROT 7.2 02/28/2013     FLP:    Lab Results   Component Value Date    CHOL 142 07/22/2014    TRIG 118 07/22/2014    HDL 57 07/22/2014     U/A:  No components found for: [place], USPGRAV, UPH, UPROTEIN, UGLUCOSE, UKETONE, UBILI, UBLOOD, Richard, UUROBIL, [place], [facility], [place], [place], [place], [facility], [place]  TSH:    Lab Results   Component Value Date    TSH 1.46 07/22/2014     Imaging: US     FIRST TRIMESTER OBSTETRIC ULTRASOUND       11/2/2021       TECHNIQUE:   Transvaginal first trimester obstetric pelvic ultrasound was performed with   color Doppler flow evaluation.; Transabdominal and transvaginal first   trimester obstetric pelvic ultrasound was performed with color Doppler flow   evaluation.       COMPARISON:   10/25/2021       HISTORY:   ORDERING SYSTEM PROVIDED HISTORY: Rule out ectopic   TECHNOLOGIST PROVIDED HISTORY:       Reason for exam:->Rule out ectopic       Beta .6       FINDINGS:   Uterus: 10.5 x 6.3 x 6.8 cm.  There is a endometrial body, hypoechoic   structure identified, measuring an average of 0.68 cm in diameter.  There is   minimal eccentric echogenicity or evidence of implantation.  No subchorionic   hemorrhage is seen.  No fetal pole or fetal heart motion is identified.       Right ovary: 4.3 x 2.8 x 2.8 cm.  Normal spectral Doppler.  5.7 cm follicular   cyst is noted.  No specific follow-up is recommended.       Left ovary: Not identified       Free fluid: None identified       Measurements:       Estimated gestational age by current ultrasound: 5 weeks 2 days, RAQUEL   07/03/2022       Estimated gestational by LMP: 7 weeks 4 days, RAQUEL 06/17/2022           Impression   Questionable intrauterine gestational sac structure, correlating with 5 weeks   2 days.  Fetal demise, anembryonic pregnancy, focal fluid or   pseudogestational sac of ectopic pregnancy are all differential possibilities.             ASSESSMENT AND PLAN:  Threatened ab, DKA, elevated HBA1C, obesity    <principal problem not specified>       I discussed miscarriages, specifically what they are, why they happen, what risk factors she has, what would the risk of recurrence be, and what is the follow up. All of her questions were answered. Will follow with serial quants in order to determine if her levels are increasing or decreasing. I have presented reasonable alternatives to the patient's proposed care, treatment, and services. The discussion I have done encompassed risks, benefits, and side effects related to the alternatives and the risks related to not receiving the proposed care, treatment, and services. All questions answered. 85 min >50% of time was spent discussing findings, management, and treatment options.       Electronically signed by Carson Recinos MD on 92/9/3476 at 4:50 PM

## 2021-11-03 NOTE — PROGRESS NOTES
Late documentation due to providing patient care:     2305: Pt arrived to room 2111 from the ED. Shift handoff with Ron RN. Pt very anxious and nervous. Weak while ambulating to the bed. Educated pt about the need for a PICC line d/t poor venous access, and pt agreed to PICC placement. Consent signed and placed in pt chart. 2314: Pt's BG taken - 278mg/dL. No interventions at this time d/t no IV access. Dr. Phan Members aware. [facility] to [facility]. Timeout completed and consent form checked. 3802: Insulin gtt started to initiate DKA Insulin Infusion Protocol. 5802: Lab called with critical Potassium of 5.4mmol/L. PerfectServe sent to Dr. Phan Members. Change maintenance fluids to D5 1/2 Normal Saline. See new orders. 0730: Shift handoff with Leif Khan.

## 2021-11-03 NOTE — PROGRESS NOTES
4 Eyes Skin Assessment     NAME:  Huey Mcmahon  YOB: 1985  MEDICAL RECORD NUMBER:  [id number]    The patient is being assess for  Shift Handoff    I agree that 2 RN's have performed a thorough Head to Toe Skin Assessment on the patient. ALL assessment sites listed below have been assessed. Areas assessed by both nurses:    Head, Face, Ears, Shoulders, Back, Chest, Arms, Elbows, Hands, Sacrum. Buttock, Coccyx, Ischium and Legs. Feet and Heels        Does the Patient have a Wound?  No noted wound(s)       Joesph Prevention initiated:  Yes   Wound Care Orders initiated:  NA    Pressure Injury (Stage 3,4, Unstageable, DTI, NWPT, and Complex wounds) if present place consult order under [de-identified] NA    New and Established Ostomies if present place consult order under : NA      Nurse 1 eSignature: Electronically signed by Basilio Looney RN on 11/3/21 at 7:49 AM EDT    **SHARE this note so that the co-signing nurse is able to place an eSignature**    Nurse 2 eSignature: Electronically signed by Kelli Diaz RN on 11/3/21 at 2:43 PM EDT

## 2021-11-03 NOTE — ED NOTES
Pt refusing PICC line placement, spoke with Dr. Raciel Menendez about pt's refusal.       Michelle Singh RN  11/02/21 4685

## 2021-11-03 NOTE — ED NOTES
Pt transported to floor with this RN, bedside report given to 2W ICU RN Selena Tenorio.       Ron Hollins RN  11/02/21 0457

## 2021-11-03 NOTE — PROGRESS NOTES
[id number]: Patient HR went from normal sinus rhythm into v-tach. HR flipped in and out of V-tach for 3 minutes. Patient stated they could feel their heart racing. BP remained stable. EKG ordered, but by time EKG machine was brought into room patient was in and remained in NSR. Blood was drawn and sent to lab. VSS at this time. Patient no longer complains of any pain or heart racing feelings.

## 2021-11-03 NOTE — H&P
300MG) 10/14/21 11/13/21  Lucila Hearn MD   tiZANidine (ZANAFLEX) 4 MG tablet Take 1 tablet by mouth every 8 hours as needed (muscle pain) 10/13/21   Lucila Hearn MD   amLODIPine (NORVASC) 10 MG tablet TAKE ONE TABLET BY MOUTH DAILY 9/11/21   Lucila Hearn MD   glipiZIDE (GLUCOTROL) 10 MG tablet TAKE ONE TABLET BY MOUTH TWICE A DAY 9/11/21   Lucila Hearn MD   amitriptyline (ELAVIL) 25 MG tablet TAKE ONE TABLET BY MOUTH ONCE NIGHTLY 8/13/21   Lucila Hearn MD   metoprolol succinate (TOPROL XL) 25 MG extended release tablet TAKE 1/2 TABLET BY MOUTH DAILY 8/13/21   Lucila Hearn MD   amitriptyline (ELAVIL) 25 MG tablet TAKE ONE TABLET BY MOUTH ONCE NIGHTLY *STOP CYMBALTA, STOP TRAZODONE* 8/13/21   Lucila Hearn MD   DULoxetine (CYMBALTA) 20 MG extended release capsule TAKE ONE CAPSULE BY MOUTH DAILY 7/18/21   Lucila Hearn MD   aspirin EC 81 MG EC tablet Take 1 tablet by mouth daily 2/19/21   Lucila Hearn MD   Insulin NPH Isophane & Regular (HUMULIN 70/30 KWIKPEN) (70-30) 100 UNIT per ML injection pen 30 units under skin AM before breakfast  And 10 units under skin PM before dinner 1/4/21   Lucila Hearn MD   metFORMIN (GLUCOPHAGE) 500 MG tablet Take one tab twice a day 1/4/21   Lucila Hearn MD   ondansetron (ZOFRAN) 4 MG tablet Take 1 tablet by mouth every 8 hours as needed for Nausea or Vomiting (prn nausea or vomiting)  Patient not taking: Reported on 2/19/2021 12/30/20   Lucila Hearn MD   acetaminophen (APAP EXTRA STRENGTH) 500 MG tablet One tab 4 times 11/20/18   Lucila Hearn MD       Allergies:  Lisinopril and Augmentin [amoxicillin-pot clavulanate]    Social History:  The patient currently lives     TOBACCO:   reports that she has never smoked. She has never used smokeless tobacco.  ETOH:   reports no history of alcohol use. Family History:  Reviewed in detail and negative for DM, Early CAD, Cancer, CVA.  Positive as follows:        Problem Relation Age of Onset    High Blood Pressure Mother     High Blood Pressure Father     Asthma Father     Cancer Maternal Grandmother         breast    Breast Cancer Maternal Grandmother     Diabetes Paternal Grandmother        REVIEW OF SYSTEMS:    as noted in the HPI. All other systems reviewed and negative. PHYSICAL EXAM:    BP (!) 119/103   Pulse 83   Temp 98.5 °F (36.9 °C) (Oral)   Resp 14   Ht 5' 8\" (1.727 m)   Wt 297 lb 9.9 oz (135 kg)   LMP 09/11/2021 (Approximate)   SpO2 100%   BMI 45.25 kg/m²     General appearance: Alert and oriented although seems to be depressed at times, somewhat anxious at times about being in the hospital, no acute respiratory distress  HEENT Normal cephalic, atraumatic without obvious deformity. Pupils equal, round, and reactive to light. Extra ocular muscles intact. Conjunctivae/corneas clear. Dry mucous membranes   Neck: Supple, no JVD  Lungs: Diminished breath sounds bilaterally but overall clear  Heart: Intermittently tachycardic but overall regular rhythm  Abdomen: Soft, nontender, nondistended, active bowel sounds  Extremities: No rashes  Skin: Skin color, texture, turgor normal.  No rashes or lesions. Neurologic: Alert and oriented X 3, neurovascularly intact with sensory/motor intact upper extremities/lower extremities, bilaterally. Cranial nerves: II-XII intact, grossly non-focal.  Mental status: Alert, oriented, thought content appropriate. Capillary Refill: Acceptable  < 3 seconds  Peripheral Pulses: +3 Easily felt, not easily obliterated with pressure      Obstetric ultrasound:  Questionable intrauterine gestational sac structure, correlating with 5 weeks   2 days.  Fetal demise, anembryonic pregnancy, focal fluid or   pseudogestational sac of ectopic pregnancy are all differential possibilities.       Continued imaging and clinical follow-up is recommended.          CBC   Recent Labs     11/02/21 1711   WBC 6.9   HGB 15.1   HCT 47.0         RENAL  Recent Labs     11/02/21 1711 11/03/21  0149   * 132*   K 4.1 3.8   CL 96* 100   CO2 13* 20*   PHOS  --  2.8   BUN 12 10   CREATININE 0.8 0.6     LFT'S  Recent Labs     11/02/21  1711   AST 13*   ALT 10   BILITOT 0.3   ALKPHOS 113     COAG  No results for input(s): INR in the last 72 hours. CARDIAC ENZYMES  No results for input(s): CKTOTAL, CKMB, CKMBINDEX, TROPONINI in the last 72 hours.     U/A:    Lab Results   Component Value Date    COLORU YELLOW 11/02/2021    WBCUA 14 11/02/2021    RBCUA 7 11/02/2021    BACTERIA 1+ 11/02/2021    CLARITYU CLOUDY 11/02/2021    SPECGRAV >1.030 11/02/2021    LEUKOCYTESUR Negative 11/02/2021    BLOODU LARGE 11/02/2021    GLUCOSEU >=1000 11/02/2021    GLUCOSEU 500 04/06/2012    AMORPHOUS 3+ 07/22/2014       ABG  No results found for: FEN4EFK, BEART, P8LBRPWT, PHART, THGBART, MIH6PUO, PO2ART, BJA5MGF        Active Hospital Problems    Diagnosis Date Noted    DKA (diabetic k[facility]acidosis) ([facility] [E11.10] 11/02/2021    Miscarriage [O03.9] 11/02/2021    DKA, type 1, not at goal [facility]) [E10.10] 11/02/2021    Diabetic neuropathy associated with type 2 diabetes mellitus ([facility] [E11.40] 02/02/2021    Hypertension [I10]          PHYSICIANS CERTIFICATION:    I certify that Judit Craft is expected to be hospitalized for greater than 2 midnights based on the following assessment and plan:      ASSESSMENT/PLAN:  · DKA  · Miscarriage  · Hypertension  · Diabetic neuropathy    Plan:  · DKA noted, start patient on insulin IV infusion with IV fluids and repeating labs every 4 with BMP/mag/phos and every 1 hour Accu-Cheks  · Holding patient's home medications, consider reevaluating patient's need for certain medications given possible miscarriage versus potentially viable intrauterine pregnancy although more convinced that this is a miscarriage to pregnancy  · OB/GYN consult for miscarriage in the morning      DVT Prophylaxis: Lovenox  Diet: Diet NPO  Code Status: Full Code  PT/OT Eval Status: Ambulatory    Dispo -pending clinical course       John Unger, DO    Thank you Erika Crandall MD for the opportunity to be involved in this patient's care. If you have any questions or concerns please feel free to contact me at [phone].

## 2021-11-03 NOTE — PROGRESS NOTES
PICC PLACEMENT      Upon arrival to place PICC line assessed chart for issues related to picc placement, check for consent, and did time out with JENNIFER Marie. Pt. Tolerated PICC placement well, no difficulty accessing right cephalic vein and 3CG technology used to verify PICC tip placement. Positive P wave with no negative deflection. Printed wave form and placed In chart. Reported off to JENNIFER Marie ok to use line and to replace all existing IV tubing.

## 2021-11-03 NOTE — CARE COORDINATION
INITIAL CASE MANAGEMENT ASSESSMENT    Reviewed chart, met with patient to assess possible discharge needs. Explained Case Management role/services. Living Situation: verified address, lives in a 2 story house with 5 LILI (15 steps to her bedroom) with her brother & her 2 children    ADLs: independent     Transportation: active , car is in the parking lot & she will drive herself home     Medications: has not been able to fill scripts or see doctor due to new insurance, [facility] & may need pablo meds, will give [facility] information    PCP: Dominic Hudson MD but has been unable to see recently due to new insurance, will give list of PCPs per insurnace    PLAN/COMMENTS: except for PCP/meds issue due to new insurance no other needs, plan is [place] home at [place]    CM provided contact information for patient or family to call with any questions. CM will follow and assist as needed.     Celina Heller RN, BSN,   [phone]    Electronically signed by Celina Heller RN on 11/3/2021 at 10:09 AM

## 2021-11-04 VITALS
HEART RATE: 109 BPM | WEIGHT: 293 LBS | RESPIRATION RATE: 16 BRPM | HEIGHT: 68 IN | OXYGEN SATURATION: 99 % | TEMPERATURE: 98 F | SYSTOLIC BLOOD PRESSURE: 103 MMHG | BODY MASS INDEX: 44.41 KG/M2 | DIASTOLIC BLOOD PRESSURE: 79 MMHG

## 2021-11-04 LAB
BASOPHILS ABSOLUTE: 0 K/UL (ref 0–0.2)
BASOPHILS RELATIVE PERCENT: 0.3 %
EOSINOPHILS ABSOLUTE: 0.1 K/UL (ref 0–0.6)
EOSINOPHILS RELATIVE PERCENT: 1.3 %
GLUCOSE BLD-MCNC: 225 MG/DL (ref 70–99)
GLUCOSE BLD-MCNC: 254 MG/DL (ref 70–99)
GLUCOSE BLD-MCNC: 255 MG/DL (ref 70–99)
GLUCOSE BLD-MCNC: 255 MG/DL (ref 70–99)
GONADOTROPIN, CHORIONIC (HCG) QUANT: 1662 MIU/ML
HCT VFR BLD CALC: 33.2 % (ref 36–48)
HEMOGLOBIN: 10.9 G/DL (ref 12–16)
LYMPHOCYTES ABSOLUTE: 2.6 K/UL (ref 1–5.1)
LYMPHOCYTES RELATIVE PERCENT: 40.6 %
MCH RBC QN AUTO: 28.7 PG (ref 26–34)
MCHC RBC AUTO-ENTMCNC: 32.8 G/DL (ref 31–36)
MCV RBC AUTO: 87.7 FL (ref 80–100)
MONOCYTES ABSOLUTE: 0.6 K/UL (ref 0–1.3)
MONOCYTES RELATIVE PERCENT: 9 %
NEUTROPHILS ABSOLUTE: 3.1 K/UL (ref 1.7–7.7)
NEUTROPHILS RELATIVE PERCENT: 48.8 %
PDW BLD-RTO: 14.9 % (ref 12.4–15.4)
PERFORMED ON: ABNORMAL
PLATELET # BLD: 264 K/UL (ref 135–450)
PMV BLD AUTO: 7.9 FL (ref 5–10.5)
RBC # BLD: 3.78 M/UL (ref 4–5.2)
WBC # BLD: 6.3 K/UL (ref 4–11)

## 2021-11-04 PROCEDURE — 6360000002 HC RX W HCPCS: Performed by: HOSPITALIST

## 2021-11-04 PROCEDURE — 2580000003 HC RX 258: Performed by: STUDENT IN AN ORGANIZED HEALTH CARE EDUCATION/TRAINING PROGRAM

## 2021-11-04 PROCEDURE — 94761 N-INVAS EAR/PLS OXIMETRY MLT: CPT

## 2021-11-04 PROCEDURE — 2580000003 HC RX 258: Performed by: NURSE PRACTITIONER

## 2021-11-04 PROCEDURE — 99232 SBSQ HOSP IP/OBS MODERATE 35: CPT | Performed by: OBSTETRICS & GYNECOLOGY

## 2021-11-04 PROCEDURE — 6370000000 HC RX 637 (ALT 250 FOR IP): Performed by: HOSPITALIST

## 2021-11-04 PROCEDURE — 6360000002 HC RX W HCPCS: Performed by: NURSE PRACTITIONER

## 2021-11-04 PROCEDURE — 2580000003 HC RX 258: Performed by: HOSPITALIST

## 2021-11-04 PROCEDURE — 84702 CHORIONIC GONADOTROPIN TEST: CPT

## 2021-11-04 PROCEDURE — 85025 COMPLETE CBC W/AUTO DIFF WBC: CPT

## 2021-11-04 RX ORDER — AMITRIPTYLINE HYDROCHLORIDE 25 MG/1
25 TABLET, FILM COATED ORAL NIGHTLY
Qty: 7 TABLET | Refills: 0 | Status: SHIPPED | OUTPATIENT
Start: 2021-11-04 | End: 2021-11-11 | Stop reason: SDUPTHER

## 2021-11-04 RX ORDER — INSULIN HUMAN 100 [IU]/ML
INJECTION, SUSPENSION SUBCUTANEOUS
Qty: 5 PEN | Refills: 0 | Status: SHIPPED | OUTPATIENT
Start: 2021-11-04 | End: 2022-06-29

## 2021-11-04 RX ORDER — METOPROLOL SUCCINATE 25 MG/1
12.5 TABLET, EXTENDED RELEASE ORAL DAILY
Qty: 15 TABLET | Refills: 3 | Status: SHIPPED | OUTPATIENT
Start: 2021-11-04 | End: 2021-11-11 | Stop reason: SDUPTHER

## 2021-11-04 RX ORDER — TIZANIDINE 4 MG/1
4 TABLET ORAL EVERY 8 HOURS PRN
Qty: 21 TABLET | Refills: 0 | Status: SHIPPED | OUTPATIENT
Start: 2021-11-04 | End: 2021-11-22 | Stop reason: SDUPTHER

## 2021-11-04 RX ORDER — GLIPIZIDE 10 MG/1
10 TABLET ORAL
Qty: 14 TABLET | Refills: 0 | Status: SHIPPED | OUTPATIENT
Start: 2021-11-04 | End: 2021-11-11 | Stop reason: SDUPTHER

## 2021-11-04 RX ORDER — AMLODIPINE BESYLATE 10 MG/1
10 TABLET ORAL DAILY
Qty: 7 TABLET | Refills: 0 | Status: SHIPPED | OUTPATIENT
Start: 2021-11-04 | End: 2022-07-14 | Stop reason: DRUGHIGH

## 2021-11-04 RX ORDER — GABAPENTIN 300 MG/1
600 CAPSULE ORAL 3 TIMES DAILY
Qty: 90 CAPSULE | Refills: 3 | Status: SHIPPED | OUTPATIENT
Start: 2021-11-04 | End: 2021-11-11 | Stop reason: SDUPTHER

## 2021-11-04 RX ADMIN — INSULIN HUMAN 21 UNITS: 100 INJECTION, SUSPENSION SUBCUTANEOUS at 16:00

## 2021-11-04 RX ADMIN — INSULIN LISPRO 3 UNITS: 100 INJECTION, SOLUTION INTRAVENOUS; SUBCUTANEOUS at 09:00

## 2021-11-04 RX ADMIN — GABAPENTIN 600 MG: 300 CAPSULE ORAL at 09:00

## 2021-11-04 RX ADMIN — TIZANIDINE 4 MG: 4 TABLET ORAL at 03:34

## 2021-11-04 RX ADMIN — SODIUM CHLORIDE 25 ML: 9 INJECTION, SOLUTION INTRAVENOUS at 14:13

## 2021-11-04 RX ADMIN — GABAPENTIN 600 MG: 300 CAPSULE ORAL at 13:34

## 2021-11-04 RX ADMIN — DULOXETINE HYDROCHLORIDE 20 MG: 20 CAPSULE, DELAYED RELEASE ORAL at 09:49

## 2021-11-04 RX ADMIN — CEFTRIAXONE 1000 MG: 1 INJECTION, POWDER, FOR SOLUTION INTRAMUSCULAR; INTRAVENOUS at 14:13

## 2021-11-04 RX ADMIN — INSULIN LISPRO 3 UNITS: 100 INJECTION, SOLUTION INTRAVENOUS; SUBCUTANEOUS at 17:00

## 2021-11-04 RX ADMIN — INSULIN HUMAN 21 UNITS: 100 INJECTION, SUSPENSION SUBCUTANEOUS at 08:00

## 2021-11-04 RX ADMIN — POTASSIUM CHLORIDE: 2 INJECTION, SOLUTION, CONCENTRATE INTRAVENOUS at 03:45

## 2021-11-04 RX ADMIN — INSULIN LISPRO 3 UNITS: 100 INJECTION, SOLUTION INTRAVENOUS; SUBCUTANEOUS at 12:00

## 2021-11-04 ASSESSMENT — PAIN SCALES - GENERAL
PAINLEVEL_OUTOF10: 0
PAINLEVEL_OUTOF10: 0

## 2021-11-04 NOTE — CARE COORDINATION
[facility]  Hyperglycemia Event      NAME: Beau Singer  MEDICAL RECORD NUMBER:  [id number]  AGE: 39 y.o. GENDER: female  : 1985  EPISODE DATE:  2021     Data     Recent Labs     21  1254 21  1340 21  1717 21  2049 21  0007 21  0904   POCGLU 152* 222* 255* 232* 225* 254*       Medications  Scheduled Medications:   DULoxetine  20 mg Oral Daily    gabapentin  600 mg Oral TID    insulin NPH  21 Units SubCUTAneous BID AC    insulin lispro  0-6 Units SubCUTAneous TID WC    insulin lispro  0-3 Units SubCUTAneous Nightly    cefTRIAXone (ROCEPHIN) IV  1,000 mg IntraVENous Q24H    enoxaparin  40 mg SubCUTAneous Daily    lidocaine 1 % injection  5 mL IntraDERmal Once    sodium chloride flush  5-40 mL IntraVENous 2 times per day       Diet  Current diet/supplement order: ADULT DIET; Regular; 3 carb choices (45 gm/meal)     Recorded PO:   last meal in flowsheets      Action      Physician Notified of event: Yes   Idalia Alvarado MD    Recommend increasing correction scale and adding Prandial Humalog.         Electronically signed by Miracle Berry RN on 2021 at 9:18 AM

## 2021-11-04 NOTE — PROGRESS NOTES
Patient alert and oriented x4, discharged to home with documented belongings. PICC line removed with no complications. Walked to main elevators. Reviewed discharge, follow up, and medication instructions with patient and patient verbalized understanding. Prescriptions signed and handed to patient. Questions and concerns addressed.  Electronically signed by Rose Fernandez RN on 11/4/2021 at 7:12 PM

## 2021-11-04 NOTE — CARE COORDINATION
[facility]  Diabetes Education   Progress Note       NAME:  Nael Schafer  MEDICAL RECORD NUMBER:  [id number]  AGE: 39 y.o. GENDER: female  : 1985  TODAY'S DATE:  2021    Subjective   Reason for Diabetic Education Evaluation and Assessment: hyperglycemia     Cassie is distressed and tearful. She expresses concern that she is not having vaginal bleeding like her previous miscarriage experiences. She expresses concern for fetal material remaining and causing health issues.       Visit Type: follow-up      Nael Schafer is a 39 y.o. female referred by:     [] Physician  [] Nursing  [x] Chart Review   [] Other:     PAST MEDICAL HISTORY        Diagnosis Date    Asthma     Hypertension     Miscarriage     Morbid obesity (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        PAST SURGICAL HISTORY    Past Surgical History:   Procedure Laterality Date    APPENDECTOMY  2018     Laparoscopic appendectomy     CHOLECYSTECTOMY, LAPAROSCOPIC      LAPAROSCOPIC APPENDECTOMY N/A 2018    LAPAROSCOPIC APPENDECTOMY performed by Dayton Garzon MD at [address]  2014    COLPOSCOPY OF VULVA, VAGINA AND CERVIX WITH CO-2 LASER       FAMILY HISTORY    Family History   Problem Relation Age of Onset    High Blood Pressure Mother     High Blood Pressure Father     Asthma Father     Cancer Maternal Grandmother         breast    Breast Cancer Maternal Grandmother     Diabetes Paternal Grandmother        SOCIAL HISTORY    Social History     Tobacco Use    Smoking status: Never Smoker    Smokeless tobacco: Never Used   Substance Use Topics    Alcohol use: No    Drug use: Not Currently     Types: Marijuana (Weed)       ALLERGIES    Allergies   Allergen Reactions    Lisinopril      cough    Augmentin [Amoxicillin-Pot Clavulanate] Rash       MEDICATIONS     DULoxetine  20 mg Oral Daily    gabapentin  600 mg Oral TID    insulin NPH  21 Units SubCUTAneous BID AC    insulin lispro  0-6 Units SubCUTAneous TID WC    insulin lispro  0-3 Units SubCUTAneous Nightly    cefTRIAXone (ROCEPHIN) IV  1,000 mg IntraVENous Q24H    enoxaparin  40 mg SubCUTAneous Daily    lidocaine 1 % injection  5 mL IntraDERmal Once    sodium chloride flush  5-40 mL IntraVENous 2 times per day       Objective        Patient Active Problem List   Diagnosis Code    Hypertension I10    Asthma J45.909    Morbid obesity ([facility] [address] E66.01    Appendicitis, acute K35.80    Acute appendicitis K35.80    Diabetic neuropathy associated with type 2 diabetes mellitus ([facility] [address] E11.40    DKA (diabetic ketoacidosis) ([facility] E11.10    Miscarriage O03.9    DKA, type 1, not at goal [facility]) E10.10    Vaginal bleeding N93.9        /79   Pulse 109   Temp 98 °F (36.7 °C) (Oral)   Resp 16   Ht 5' 8\" (1.727 m)   Wt (!) 308 lb 6.8 oz (139.9 kg)   LMP 09/11/2021 (Approximate)   SpO2 99%   BMI 46.90 kg/m²     HgBA1c:    Lab Results   Component Value Date    LABA1C 12.2 11/03/2021       Recent Labs     11/04/21  0007 11/04/21  0904 11/04/21  1146 11/04/21  1549   POCGLU 225* 254* 255* 255*       BUN/Creatinine:    Lab Results   Component Value Date    BUN 9 11/03/2021    CREATININE 0.6 11/03/2021       Assessment        Diabetes Management and Education    Does the patient have a Primary Care Physician? No.                    Does the patient/caregiver understand S/S of Hyperglycemia? No:     Reviewed symptoms, prevention and treatment. Level of patient/caregiver understanding able to:        [] Verbalized Understanding   [] Demonstrated Understanding       [] Teach Back       [x] Needs Reinforcement     []  Other:           Plan        Ongoing diabetes education and blood glucose monitoring. Education session suspended due emotional distress regarding non-diabetes concerns. [facility]. Notified Dr. Lebron Sanchez of concerns about current state of limited vaginal discharge and discharge plan. Recommend meds to beds for all diabetes medication.            Teaching Time Diabetes Education:  10 minutes     Electronically signed by Mando Huffman on 11/4/2021 at 3:52 PM

## 2021-11-04 NOTE — PROGRESS NOTES
Patient sitting up in bed, awake, eating breakfast, a&ox4. Patient denies pain at this time. Patient tolerating PO intake, n/v not mentioned. Patient denies pain. Patient asking about blood work collected for hcg levels, I told her the MD will discuss results with her when he round. Patient ambulating around room fine. Patient IV fluids infusing as ordered. Patient needs assessed and addressed. Call light and bedside table within reach. Will continue to monitor and reassess.

## 2021-11-04 NOTE — DISCHARGE SUMMARY
[facility]   Discharge Summary    Patient:  Jaime Guzman  YOB: 1985    MRN: [id number]   Acct: [de-identified]    Primary Care Physician: Dominic Hudson MD    Admit date:  11/2/2021    Discharge date:   11/4/2021      Discharge Diagnoses: Active Problems:    Hypertension    Diabetic neuropathy associated with type 2 diabetes mellitus ([facility] [address]    DKA (diabetic ketoacidosis) ([facility] [address]    Miscarriage    DKA, type 1, not at goal [facility])    Vaginal bleeding           Admitted for: (HPI) vaginal bleeding    Hospital Course:36f admitted with DKA, miscarriage. Required ICU admit with insulin gtts per dka protocol, Gyne consult for miscarriage. Has remained hemodynamically stable, afebrile, tolerating PO and will be discharged stable / improved with outpatient follow up for continued hcg monitoring as per Gyne. Consultants:  OB-Gyne - Dr. Hamilton     Discharge Medications:       Medication List      START taking these medications    gabapentin 300 MG capsule  Commonly known as: NEURONTIN  Take 2 capsules by mouth 3 times daily for 7 days. Replaces: gabapentin 600 MG tablet        CHANGE how you take these medications    amitriptyline 25 MG tablet  Commonly known as: ELAVIL  Take 1 tablet by mouth nightly  What changed: See the new instructions. amLODIPine 10 MG tablet  Commonly known as: NORVASC  Take 1 tablet by mouth daily for 7 days  What changed: See the new instructions. glipiZIDE 10 MG tablet  Commonly known as: GLUCOTROL  Take 1 tablet by mouth 2 times daily (before meals)  What changed: See the new instructions. metoprolol succinate 25 MG extended release tablet  Commonly known as: TOPROL XL  Take 0.5 tablets by mouth daily for 7 days  What changed: See the new instructions.         CONTINUE taking these medications    acetaminophen 500 MG tablet  Commonly known as: APAP Extra Strength  One tab 4 times     aspirin EC 81 MG EC tablet  Take 1 tablet by mouth daily     DULoxetine 20 MG extended release capsule  Commonly known as: CYMBALTA  TAKE ONE CAPSULE BY MOUTH DAILY     HumuLIN 70/30 KwikPen (70-30) 100 UNIT per ML injection pen  Generic drug: Insulin NPH Isophane & Regular  30 units under skin AM before breakfast  And 10 units under skin PM before dinner     metFORMIN 500 MG tablet  Commonly known as: GLUCOPHAGE  Take one tab twice a day     ondansetron 4 MG tablet  Commonly known as: ZOFRAN  Take 1 tablet by mouth every 8 hours as needed for Nausea or Vomiting (prn nausea or vomiting)     tiZANidine 4 MG tablet  Commonly known as: ZANAFLEX  Take 1 tablet by mouth every 8 hours as needed (muscle pain)        STOP taking these medications    gabapentin 600 MG tablet  Commonly known as: NEURONTIN  Replaced by: gabapentin 300 MG capsule           Where to Get Your Medications      These medications were sent to [facility] [place] [address] Jose Angel [phone]  [address] [place] [id number]    Phone: [phone]   · amitriptyline 25 MG tablet  · amLODIPine 10 MG tablet  · tiZANidine 4 MG tablet     You can get these medications from any pharmacy    Bring a paper prescription for each of these medications  · gabapentin 300 MG capsule  · glipiZIDE 10 MG tablet  · HumuLIN 70/30 KwikPen (70-30) 100 UNIT per ML injection pen  · metFORMIN 500 MG tablet  · metoprolol succinate 25 MG extended release tablet           Physical Exam:    Vitals:  Vitals:    11/04/21 0928 11/04/21 0955 11/04/21 1150 11/04/21 1550   BP:   (!) 138/96 103/79   Pulse:  80 91 109   Resp:   18 16   Temp:   98 °F (36.7 °C) 98 °F (36.7 °C)   TempSrc:   Oral Oral   SpO2: 99%  99% 99%   Weight:       Height:         Weight: Weight: (!) 308 lb 6.8 oz (139.9 kg)     24 hour intake/output:    Intake/Output Summary (Last 24 hours) at 11/4/2021 1733  Last data filed at 11/4/2021 1449  Gross per 24 hour   Intake 512.9 ml   Output 450 ml   Net 62.9 ml       General appearance - alert, well appearing, and in no distress  Chest - no dsitress on room air  Heart - normal rate, regular rhythm   Abdomen - soft, nontender, nondistended   Obese: Yes; Protuberant: Yes   Neurological - alert, oriented, normal speech   Extremities - peripheral pulses normal, no pedal edema     Radiology reports as per the Radiologist  Radiology: US OB LESS THAN 14 WEEKS SINGLE OR FIRST GESTATION    Result Date: 11/2/2021  EXAMINATION: FIRST TRIMESTER OBSTETRIC ULTRASOUND 11/2/2021 TECHNIQUE: Transvaginal first trimester obstetric pelvic ultrasound was performed with color Doppler flow evaluation.; Transabdominal and transvaginal first trimester obstetric pelvic ultrasound was performed with color Doppler flow evaluation. COMPARISON: 10/25/2021 HISTORY: ORDERING SYSTEM PROVIDED HISTORY: Rule out ectopic TECHNOLOGIST PROVIDED HISTORY: Reason for exam:->Rule out ectopic Beta .6 FINDINGS: Uterus: 10.5 x 6.3 x 6.8 cm. There is a endometrial body, hypoechoic structure identified, measuring an average of 0.68 cm in diameter. There is minimal eccentric echogenicity or evidence of implantation. No subchorionic hemorrhage is seen. No fetal pole or fetal heart motion is identified. Right ovary: 4.3 x 2.8 x 2.8 cm. Normal spectral Doppler. 1.5 cm follicular cyst is noted. No specific follow-up is recommended. Left ovary: Not identified Free fluid: None identified Measurements: Estimated gestational age by current ultrasound: 5 weeks 2 days, RAQUEL 07/03/2022 Estimated gestational by LMP: 7 weeks 4 days, RAQUEL 06/17/2022     Questionable intrauterine gestational sac structure, correlating with 5 weeks 2 days. Fetal demise, anembryonic pregnancy, focal fluid or pseudogestational sac of ectopic pregnancy are all differential possibilities. Continued imaging and clinical follow-up is recommended.      US OB TRANSVAGINAL    Result Date: 11/2/2021  EXAMINATION: FIRST TRIMESTER OBSTETRIC ULTRASOUND 11/2/2021 TECHNIQUE: Transvaginal first trimester obstetric pelvic ultrasound was performed with color Doppler flow evaluation.; Transabdominal and transvaginal first trimester obstetric pelvic ultrasound was performed with color Doppler flow evaluation. COMPARISON: 10/25/2021 HISTORY: ORDERING SYSTEM PROVIDED HISTORY: Rule out ectopic TECHNOLOGIST PROVIDED HISTORY: Reason for exam:->Rule out ectopic Beta .6 FINDINGS: Uterus: 10.5 x 6.3 x 6.8 cm. There is a endometrial body, hypoechoic structure identified, measuring an average of 0.68 cm in diameter. There is minimal eccentric echogenicity or evidence of implantation. No subchorionic hemorrhage is seen. No fetal pole or fetal heart motion is identified. Right ovary: 4.3 x 2.8 x 2.8 cm. Normal spectral Doppler. 1.5 cm follicular cyst is noted. No specific follow-up is recommended. Left ovary: Not identified Free fluid: None identified Measurements: Estimated gestational age by current ultrasound: 5 weeks 2 days, RAQUEL 07/03/2022 Estimated gestational by LMP: 7 weeks 4 days, RAQUEL 06/17/2022     Questionable intrauterine gestational sac structure, correlating with 5 weeks 2 days. Fetal demise, anembryonic pregnancy, focal fluid or pseudogestational sac of ectopic pregnancy are all differential possibilities. Continued imaging and clinical follow-up is recommended. Results for orders placed or performed during the hospital encounter of 11/02/21   Culture, Urine    Specimen: Urine, clean catch   Result Value Ref Range    Urine Culture, Routine       <10,000 CFU/ml mixed skin/urogenital aury.  No further workup   CBC Auto Differential   Result Value Ref Range    WBC 6.9 4.0 - 11.0 K/uL    RBC 5.32 (H) 4.00 - 5.20 M/uL    Hemoglobin 15.1 12.0 - 16.0 g/dL    Hematocrit 47.0 36.0 - 48.0 %    MCV 88.3 80.0 - 100.0 fL    MCH 28.3 26.0 - 34.0 pg    MCHC 32.1 31.0 - 36.0 g/dL    RDW 14.9 12.4 - 15.4 %    Platelets 187 637 - 133 K/uL    MPV 8.1 5.0 - 10.5 fL    Neutrophils % 53.4 %    Lymphocytes % 38.6 %    Monocytes % 6.8 %    Eosinophils % 0.7 %    Basophils % 0.5 %    Neutrophils Absolute 3.7 1.7 - 7.7 K/uL    Lymphocytes Absolute 2.7 1.0 - 5.1 K/uL    Monocytes Absolute 0.5 0.0 - 1.3 K/uL    Eosinophils Absolute 0.1 0.0 - 0.6 K/uL    Basophils Absolute 0.0 0.0 - 0.2 K/uL   Comprehensive Metabolic Panel w/ Reflex to MG   Result Value Ref Range    Sodium 134 (L) 136 - 145 mmol/L    Potassium reflex Magnesium 4.1 3.5 - 5.1 mmol/L    Chloride 96 (L) 99 - 110 mmol/L    CO2 13 (LL) 21 - 32 mmol/L    Anion Gap 25 (H) 3 - 16    Glucose 371 (H) 70 - 99 mg/dL    BUN 12 7 - 20 mg/dL    CREATININE 0.8 0.6 - 1.1 mg/dL    GFR Non-African American >60 >60    GFR African American >60 >60    Calcium 9.5 8.3 - 10.6 mg/dL    Total Protein 8.3 (H) 6.4 - 8.2 g/dL    Albumin 4.2 3.4 - 5.0 g/dL    Albumin/Globulin Ratio 1.0 (L) 1.1 - 2.2    Total Bilirubin 0.3 0.0 - 1.0 mg/dL    Alkaline Phosphatase 113 40 - 129 U/L    ALT 10 10 - 40 U/L    AST 13 (L) 15 - 37 U/L   HCG, Quantitative, Pregnancy   Result Value Ref Range    hCG Quant 1865.0 <5.0 mIU/mL   Urinalysis Reflex to Culture    Specimen: Urine, clean catch   Result Value Ref Range    Color, UA YELLOW Straw/Yellow    Clarity, UA CLOUDY (A) Clear    Glucose, Ur >=1000 (A) Negative mg/dL    Bilirubin Urine Negative Negative    Ketones, Urine >=80 (A) Negative mg/dL    Specific Gravity, UA >1.030 1.005 - 1.030    Blood, Urine LARGE (A) Negative    pH, UA 6.0 5.0 - 8.0    Protein, UA TRACE (A) Negative mg/dL    Urobilinogen, Urine 0.2 <2.0 E.U./dL    Nitrite, Urine Negative Negative    Leukocyte Esterase, Urine Negative Negative    Microscopic Examination YES     Urine Type NotGiven     Urine Reflex to Culture Yes    Microscopic Urinalysis   Result Value Ref Range    Bacteria, UA 1+ (A) None Seen /HPF    Hyaline Casts, UA 1 0 - 8 /LPF    WBC, UA 14 (H) 0 - 5 /HPF    RBC, UA 7 (H) 0 - 4 /HPF    Epithelial Cells, UA 7 (H) 0 - 5 /HPF   Beta-Hydroxybutyrate Result Value Ref Range    Beta-Hydroxybutyrate 1.82 (H) 0.00 - 0.27 mmol/L   Basic Metabolic Panel   Result Value Ref Range    Sodium 132 (L) 136 - 145 mmol/L    Potassium 3.8 3.5 - 5.1 mmol/L    Chloride 100 99 - 110 mmol/L    CO2 20 (L) 21 - 32 mmol/L    Anion Gap 12 3 - 16    Glucose 310 (H) 70 - 99 mg/dL    BUN 10 7 - 20 mg/dL    CREATININE 0.6 0.6 - 1.1 mg/dL    GFR Non-African American >60 >60    GFR African American >60 >60    Calcium 8.3 8.3 - 10.6 mg/dL   Basic Metabolic Panel   Result Value Ref Range    Sodium 129 (L) 136 - 145 mmol/L    Potassium 5.4 (H) 3.5 - 5.1 mmol/L    Chloride 101 99 - 110 mmol/L    CO2 19 (L) 21 - 32 mmol/L    Anion Gap 9 3 - 16    Glucose 543 (H) 70 - 99 mg/dL    BUN 8 7 - 20 mg/dL    CREATININE 0.6 0.6 - 1.1 mg/dL    GFR Non-African American >60 >60    GFR African American >60 >60    Calcium 7.5 (L) 8.3 - 10.6 mg/dL   Basic Metabolic Panel   Result Value Ref Range    Sodium 133 (L) 136 - 145 mmol/L    Potassium 3.5 3.5 - 5.1 mmol/L    Chloride 99 99 - 110 mmol/L    CO2 20 (L) 21 - 32 mmol/L    Anion Gap 14 3 - 16    Glucose 156 (H) 70 - 99 mg/dL    BUN 7 7 - 20 mg/dL    CREATININE 0.5 (L) 0.6 - 1.1 mg/dL    GFR Non-African American >60 >60    GFR African American >60 >60    Calcium 8.6 8.3 - 10.6 mg/dL   Magnesium   Result Value Ref Range    Magnesium 1.70 (L) 1.80 - 2.40 mg/dL   Magnesium   Result Value Ref Range    Magnesium 1.50 (L) 1.80 - 2.40 mg/dL   Magnesium   Result Value Ref Range    Magnesium 1.70 (L) 1.80 - 2.40 mg/dL   Phosphorus   Result Value Ref Range    Phosphorus 2.8 2.5 - 4.9 mg/dL   Phosphorus   Result Value Ref Range    Phosphorus 2.2 (L) 2.5 - 4.9 mg/dL   Phosphorus   Result Value Ref Range    Phosphorus 2.7 2.5 - 4.9 mg/dL   CBC Auto Differential   Result Value Ref Range    WBC 5.9 4.0 - 11.0 K/uL    RBC 3.80 (L) 4.00 - 5.20 M/uL    Hemoglobin 10.8 (L) 12.0 - 16.0 g/dL    Hematocrit 33.6 (L) 36.0 - 48.0 %    MCV 88.4 80.0 - 100.0 fL    MCH 28.5 26.0 - 34.0 pg    MCHC 32.3 31.0 - 36.0 g/dL    RDW 14.9 12.4 - 15.4 %    Platelets 974 855 - 881 K/uL    MPV 7.9 5.0 - 10.5 fL    Neutrophils % 38.1 %    Lymphocytes % 52.5 %    Monocytes % 7.6 %    Eosinophils % 1.2 %    Basophils % 0.6 %    Neutrophils Absolute 2.3 1.7 - 7.7 K/uL    Lymphocytes Absolute 3.1 1.0 - 5.1 K/uL    Monocytes Absolute 0.5 0.0 - 1.3 K/uL    Eosinophils Absolute 0.1 0.0 - 0.6 K/uL    Basophils Absolute 0.0 0.0 - 0.2 K/uL   Hemoglobin and Hematocrit, Blood   Result Value Ref Range    Hemoglobin 10.9 (L) 12.0 - 16.0 g/dL    Hematocrit 33.4 (L) 36.0 - 48.0 %   Hemoglobin and Hematocrit, Blood   Result Value Ref Range    Hemoglobin 11.6 (L) 12.0 - 16.0 g/dL    Hematocrit 34.8 (L) 36.0 - 48.0 %   Protime-INR   Result Value Ref Range    Protime 11.2 9.9 - 12.7 sec    INR 0.99 0.88 - 1.12   Hemoglobin A1C   Result Value Ref Range    Hemoglobin A1C 12.2 See comment %    eAG 303.4 mg/dL   Basic Metabolic Panel   Result Value Ref Range    Sodium 129 (L) 136 - 145 mmol/L    Potassium 3.8 3.5 - 5.1 mmol/L    Chloride 96 (L) 99 - 110 mmol/L    CO2 20 (L) 21 - 32 mmol/L    Anion Gap 13 3 - 16    Glucose 253 (H) 70 - 99 mg/dL    BUN 9 7 - 20 mg/dL    CREATININE 0.6 0.6 - 1.1 mg/dL    GFR Non-African American >60 >60    GFR African American >60 >60    Calcium 8.5 8.3 - 10.6 mg/dL   Magnesium   Result Value Ref Range    Magnesium 2.10 1.80 - 2.40 mg/dL   Phosphorus   Result Value Ref Range    Phosphorus 3.1 2.5 - 4.9 mg/dL   CBC Auto Differential   Result Value Ref Range    WBC 6.3 4.0 - 11.0 K/uL    RBC 3.78 (L) 4.00 - 5.20 M/uL    Hemoglobin 10.9 (L) 12.0 - 16.0 g/dL    Hematocrit 33.2 (L) 36.0 - 48.0 %    MCV 87.7 80.0 - 100.0 fL    MCH 28.7 26.0 - 34.0 pg    MCHC 32.8 31.0 - 36.0 g/dL    RDW 14.9 12.4 - 15.4 %    Platelets 214 834 - 766 K/uL    MPV 7.9 5.0 - 10.5 fL    Neutrophils % 48.8 %    Lymphocytes % 40.6 %    Monocytes % 9.0 %    Eosinophils % 1.3 %    Basophils % 0.3 %    Neutrophils Absolute 3.1 1.7 - 7.7 K/uL    Lymphocytes Absolute 2.6 1.0 - 5.1 K/uL    Monocytes Absolute 0.6 0.0 - 1.3 K/uL    Eosinophils Absolute 0.1 0.0 - 0.6 K/uL    Basophils Absolute 0.0 0.0 - 0.2 K/uL   HCG, Quantitative, Pregnancy   Result Value Ref Range    hCG Quant 1662.0 <5.0 mIU/mL   POCT Glucose   Result Value Ref Range    POC Glucose 439 (H) 70 - 99 mg/dl    Performed on ACCU-CHEK    POCT Glucose   Result Value Ref Range    POC Glucose 376 (H) 70 - 99 mg/dl    Performed on ACCU-CHEK    POCT Glucose   Result Value Ref Range    POC Glucose 278 (H) 70 - 99 mg/dl    Performed on ACCU-CHEK    POCT Glucose   Result Value Ref Range    POC Glucose 271 (H) 70 - 99 mg/dl    Performed on ACCU-CHEK    POCT Glucose   Result Value Ref Range    POC Glucose 280 (H) 70 - 99 mg/dl    Performed on ACCU-CHEK    POCT Glucose   Result Value Ref Range    POC Glucose 211 (H) 70 - 99 mg/dl    Performed on ACCU-CHEK    POCT Glucose   Result Value Ref Range    POC Glucose 192 (H) 70 - 99 mg/dl    Performed on ACCU-CHEK    POCT Glucose   Result Value Ref Range    POC Glucose 144 (H) 70 - 99 mg/dl    Performed on ACCU-CHEK    POCT Glucose   Result Value Ref Range    POC Glucose 142 (H) 70 - 99 mg/dl    Performed on ACCU-CHEK    POCT Glucose   Result Value Ref Range    POC Glucose 153 (H) 70 - 99 mg/dl    Performed on ACCU-CHEK    POCT Glucose   Result Value Ref Range    POC Glucose 155 (H) 70 - 99 mg/dl    Performed on ACCU-CHEK    POCT Glucose   Result Value Ref Range    POC Glucose 229 (H) 70 - 99 mg/dl    Performed on ACCU-CHEK    POCT Glucose   Result Value Ref Range    POC Glucose 233 (H) 70 - 99 mg/dl    Performed on ACCU-CHEK    POCT Glucose   Result Value Ref Range    POC Glucose 219 (H) 70 - 99 mg/dl    Performed on ACCU-CHEK    POCT Glucose   Result Value Ref Range    POC Glucose 152 (H) 70 - 99 mg/dl    Performed on ACCU-CHEK    POCT Glucose   Result Value Ref Range    POC Glucose 222 (H) 70 - 99 mg/dl    Performed on ACCU-CHEK    POCT Glucose   Result Value Ref Range    POC Glucose 255 (H) 70 - 99 mg/dl    Performed on ACCU-CHEK    POCT Glucose   Result Value Ref Range    POC Glucose 232 (H) 70 - 99 mg/dl    Performed on ACCU-CHEK    POCT Glucose   Result Value Ref Range    POC Glucose 225 (H) 70 - 99 mg/dl    Performed on ACCU-CHEK    POCT Glucose   Result Value Ref Range    POC Glucose 254 (H) 70 - 99 mg/dl    Performed on ACCU-CHEK    POCT Glucose   Result Value Ref Range    POC Glucose 255 (H) 70 - 99 mg/dl    Performed on ACCU-CHEK    POCT Glucose   Result Value Ref Range    POC Glucose 255 (H) 70 - 99 mg/dl    Performed on ACCU-CHEK    EKG 12 Lead   Result Value Ref Range    Ventricular Rate 135 BPM    Atrial Rate 135 BPM    P-R Interval 126 ms    QRS Duration 68 ms    Q-T Interval 298 ms    QTc Calculation (Bazett) 447 ms    P Axis 68 degrees    R Axis 58 degrees    T Axis 31 degrees    Diagnosis       Sinus tachycardia  Poor data quality, interpretation may be adversely affectedNonspecific T wave abnormality  possiblyWhen compared with ECG of 10-NOV-2017 22:00,Poor data quality ekgs limits comparisonNo significant change evidentConfirmed by [facility] GILL[facility] VALDES MD ([id number]) on 11/3/2021 2:15:51 PM   EKG 12 Lead   Result Value Ref Range    Ventricular Rate 118 BPM    Atrial Rate 118 BPM    P-R Interval 138 ms    QRS Duration 76 ms    Q-T Interval 324 ms    QTc Calculation (Bazett) 454 ms    P Axis 51 degrees    R Axis 47 degrees    T Axis 11 degrees    Diagnosis       Sinus tachycardia** * Poor data quality, interpretation may be adversely affectedNonspecific T wave abnormalityAbnormal ECGWhen compared with ECG of 02-NOV-2021 16:06, (unconfirmed)No significant change evidentConfirmed by [facility] GILLIAN VALDES MD ([id number]) on 11/3/2021 2:16:41 PM   TYPE AND SCREEN   Result Value Ref Range    ABO/Rh CANCELED     Antibody Screen NEG    ABO/RH   Result Value Ref Range    ABO/Rh O POS        Diet:  ADULT DIET;  Regular; 3 carb choices (45 gm/meal)    Activity:  Activity as tolerated (Patient may move about with assist as indicated or with supervision.)    Follow-up:  in the next few days with Ulices Cerda MD      Disposition: home    Condition: Stable      Time Spent: 35 minutes    Electronically signed by Feliciano Pride MD on 11/4/2021 at 5:33 PM    Discharging Hospitalist

## 2021-11-04 NOTE — PROGRESS NOTES
pts feeling better today. She reports decreased vag bleeding. She was transferred from ICU. I discussed that her quant decreased from [place] to 1662. She's resting now. I discussed that her decrease in quants means that she is miscarrying and that may take days to weeks. Recommend weekly quants until 0. Assess:  Missed ab. DKA, obesity  Plan:  Pt will need weekly quant hcgs until 0. She may follow up with me next week to she may f/u with her established ob/gyn Dr Linnea Mcleod. All questions answered. Will sign off. Please recontact me again if needed. 25 min >50% of time was spent discussing findings, management, and treatment options.

## 2021-11-04 NOTE — PROGRESS NOTES
Perfect serve message sent to on call NP, Caroline Morejon, needing clarification on current IV fluid orders and patient blood glucose. Received new orders for IV fluids, see orders and MAR.

## 2021-11-06 ENCOUNTER — TELEPHONE (OUTPATIENT)
Dept: PRIMARY CARE CLINIC | Age: 36
End: 2021-11-06

## 2021-11-06 NOTE — TELEPHONE ENCOUNTER
[facility] Transitions Initial Follow Up Call    Outreach made within 2 business days of discharge: Yes    Patient: Reina De La Torre Patient : 1985   MRN: <[id number]>  Reason for Admission: There are no discharge diagnoses documented for the most recent discharge. Discharge Date: 21       Spoke with: Reina De La Torre    Discharge department/facility: [address] Interactive Patient Contact:  Was patient able to fill all prescriptions: Yes  Was patient instructed to bring all medications to the follow-up visit: Yes  Is patient taking all medications as directed in the discharge summary? Yes  Does patient understand their discharge instructions: Yes  Does patient have questions or concerns that need addressed prior to 7-14 day follow up office visit: no    Scheduled appointment with PCP within 7-14 days    Follow Up  No future appointments.     Izabela Ralph

## 2021-11-11 ENCOUNTER — OFFICE VISIT (OUTPATIENT)
Dept: PRIMARY CARE CLINIC | Age: 36
End: 2021-11-11
Payer: MEDICARE

## 2021-11-11 VITALS
OXYGEN SATURATION: 99 % | TEMPERATURE: 97.3 F | HEART RATE: 104 BPM | HEIGHT: 67 IN | WEIGHT: 293 LBS | BODY MASS INDEX: 45.99 KG/M2 | SYSTOLIC BLOOD PRESSURE: 163 MMHG | DIASTOLIC BLOOD PRESSURE: 110 MMHG

## 2021-11-11 DIAGNOSIS — R07.9 CHEST PAIN, UNSPECIFIED TYPE: ICD-10-CM

## 2021-11-11 DIAGNOSIS — G89.29 CHRONIC PAIN OF RIGHT KNEE: ICD-10-CM

## 2021-11-11 DIAGNOSIS — Z13.29 SCREENING FOR THYROID DISORDER: ICD-10-CM

## 2021-11-11 DIAGNOSIS — M25.561 CHRONIC PAIN OF RIGHT KNEE: ICD-10-CM

## 2021-11-11 DIAGNOSIS — R00.0 ELEVATED PULSE RATE: ICD-10-CM

## 2021-11-11 DIAGNOSIS — R94.31 ABNORMAL EKG: ICD-10-CM

## 2021-11-11 DIAGNOSIS — E11.42 DIABETIC POLYNEUROPATHY ASSOCIATED WITH TYPE 2 DIABETES MELLITUS (HCC): ICD-10-CM

## 2021-11-11 DIAGNOSIS — I16.0 HYPERTENSIVE URGENCY: ICD-10-CM

## 2021-11-11 DIAGNOSIS — Z01.84 IMMUNITY STATUS TESTING: ICD-10-CM

## 2021-11-11 DIAGNOSIS — I10 ESSENTIAL HYPERTENSION: ICD-10-CM

## 2021-11-11 DIAGNOSIS — R00.0 RAPID HEART BEAT: ICD-10-CM

## 2021-11-11 DIAGNOSIS — E10.10 DIABETIC KETOACIDOSIS WITHOUT COMA ASSOCIATED WITH TYPE 1 DIABETES MELLITUS (HCC): Primary | ICD-10-CM

## 2021-11-11 DIAGNOSIS — E11.65 UNCONTROLLED TYPE 2 DIABETES MELLITUS WITH HYPERGLYCEMIA (HCC): ICD-10-CM

## 2021-11-11 DIAGNOSIS — O03.9 MISCARRIAGE: ICD-10-CM

## 2021-11-11 PROCEDURE — 99215 OFFICE O/P EST HI 40 MIN: CPT | Performed by: NURSE PRACTITIONER

## 2021-11-11 PROCEDURE — 1111F DSCHRG MED/CURRENT MED MERGE: CPT | Performed by: NURSE PRACTITIONER

## 2021-11-11 RX ORDER — VALSARTAN 80 MG/1
80 TABLET ORAL DAILY
Qty: 30 TABLET | Refills: 3 | Status: SHIPPED | OUTPATIENT
Start: 2021-11-11 | End: 2022-06-29 | Stop reason: SDUPTHER

## 2021-11-11 RX ORDER — ACETAMINOPHEN 500 MG
TABLET ORAL
Qty: 120 TABLET | Refills: 3 | Status: SHIPPED | OUTPATIENT
Start: 2021-11-11 | End: 2022-07-14

## 2021-11-11 RX ORDER — ONDANSETRON 4 MG/1
4 TABLET, FILM COATED ORAL EVERY 8 HOURS PRN
Qty: 20 TABLET | Refills: 0 | Status: CANCELLED | OUTPATIENT
Start: 2021-11-11

## 2021-11-11 RX ORDER — ASPIRIN 81 MG/1
81 TABLET ORAL DAILY
Qty: 90 TABLET | Refills: 1 | Status: CANCELLED | OUTPATIENT
Start: 2021-11-11

## 2021-11-11 RX ORDER — FLUCONAZOLE 150 MG/1
150 TABLET ORAL ONCE
Qty: 1 TABLET | Refills: 3 | Status: SHIPPED | OUTPATIENT
Start: 2021-11-11 | End: 2021-11-11

## 2021-11-11 RX ORDER — METOPROLOL SUCCINATE 25 MG/1
25 TABLET, EXTENDED RELEASE ORAL DAILY
Qty: 90 TABLET | Refills: 1 | Status: SHIPPED | OUTPATIENT
Start: 2021-11-11 | End: 2021-11-29 | Stop reason: SDUPTHER

## 2021-11-11 RX ORDER — GLIPIZIDE 10 MG/1
10 TABLET ORAL
Qty: 60 TABLET | Refills: 3 | Status: SHIPPED | OUTPATIENT
Start: 2021-11-11 | End: 2022-06-29

## 2021-11-11 RX ORDER — AMITRIPTYLINE HYDROCHLORIDE 25 MG/1
25 TABLET, FILM COATED ORAL NIGHTLY
Qty: 30 TABLET | Refills: 0 | Status: SHIPPED | OUTPATIENT
Start: 2021-11-11 | End: 2022-01-25

## 2021-11-11 RX ORDER — GABAPENTIN 300 MG/1
600 CAPSULE ORAL 3 TIMES DAILY
Qty: 90 CAPSULE | Refills: 3 | Status: SHIPPED | OUTPATIENT
Start: 2021-11-11 | End: 2022-06-29

## 2021-11-11 RX ORDER — TIZANIDINE 4 MG/1
4 TABLET ORAL EVERY 8 HOURS PRN
Qty: 21 TABLET | Refills: 0 | Status: CANCELLED | OUTPATIENT
Start: 2021-11-11 | End: 2021-11-18

## 2021-11-11 NOTE — PROGRESS NOTES
Post-Discharge Transitional Care Management Services or Hospital Follow Up      Krysten Cole   YOB: 1985    Date of Office Visit:  2021  Date of Hospital Admission: 21  Date of Hospital Discharge: 21  Risk of hospital readmission (high >=14%. Medium >=10%) :Readmission Risk Score: 9.7      Care management risk score Rising risk (score 2-5) and Complex Care (Scores >=6): 6     Non face to face  following discharge, date last encounter closed (first attempt may have been earlier): 2021  1:15 PM    Call initiated 2 business days of discharge: Yes    Patient Active Problem List   Diagnosis    Hypertension    Asthma    Morbid [facility] ([facility]    Appendicitis, acute    Acute appendicitis    [facility]    DKA (diabetic ketoacidosis) ([facility]    Miscarriage    DKA, type 1, not at goal [facility])    Vaginal bleeding       Allergies   Allergen Reactions    Lisinopril      cough    Augmentin [Amoxicillin-Pot Clavulanate] Rash       Medications listed as ordered at the time of discharge from hospital    Medications marked \"taking\" at this time  Outpatient Medications Marked as Taking for the 21 encounter (Office Visit) with ALIYA Robbins CNP   Medication Sig Dispense Refill    acetaminophen (APAP EXTRA STRENGTH) 500 MG tablet One tab 4 times 120 tablet 3    amitriptyline (ELAVIL) 25 MG tablet Take 1 tablet by mouth nightly 30 tablet 0    metoprolol succinate (TOPROL XL) 25 MG extended release tablet Take 1 tablet by mouth daily 90 tablet 1    valsartan (DIOVAN) 80 MG tablet Take 1 tablet by mouth daily 30 tablet 3    [] fluconazole (DIFLUCAN) 150 MG tablet Take 1 tablet by mouth once for 1 dose 1 tablet 3    gabapentin (NEURONTIN) 300 MG capsule Take 2 capsules by mouth 3 times daily for 30 days.  90 capsule 3    glipiZIDE (GLUCOTROL) 10 MG tablet Take 1 tablet by mouth 2 times daily (before meals) 60 tablet 3    dapagliflozin (FARXIGA) 10 MG tablet Take 0.5 tablets by mouth every morning [id number] 04/2024 14 tablet 0        Medications patient taking as of now reconciled against medications ordered at time of hospital discharge: Yes    Chief Complaint   Patient presents with   [address] from Hospital       History of Present illness - Follow up of Hospital diagnosis(es):Miscarriage, type 2 diabetes, hypertensive urgency, DKA     Inpatient course: Discharge summary reviewed- see chart. Interval history/Current status: stable    A comprehensive review of systems was negative except for what was noted in the HPI. Vitals:    11/11/21 1142 11/11/21 1146   BP: (!) 141/100 (!) 163/110   Pulse: 104    Temp: 97.3 °F (36.3 °C)    SpO2: 99%    Weight: 294 lb 6.4 oz (133.5 kg)    Height: 5' 7\" (1.702 m)      Body mass index is 46.11 kg/m². Wt Readings from Last 3 Encounters:   11/11/21 294 lb 6.4 oz (133.5 kg)   11/04/21 (!) 308 lb 6.8 oz (139.9 kg)   10/25/21 293 lb 3.4 oz (133 kg)     BP Readings from Last 3 Encounters:   11/11/21 (!) 163/110   11/04/21 103/79   10/25/21 (!) 162/92      She reports her most recent hospitalization was her  7th pregnancy, has only 2 living children (16 12 yo). Diagnosed with gestational diabetes with 2nd child whom is 15, which was her first diagnosis of diabetes. Chart states Type I and II diabetes. Took medication after pregnancy for a short period, blood sugars improved and medication discontinued. Gradually blood sugar has increased, states she has not taken care of herself. 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 Very tearful throughout appointment regarding loss pregnancy. States she does not have a boyfriend, not using birthcontrol.          Physical Exam:  General Appearance: alert and oriented to person, place and time, well developed and well- nourished, in no acute distress  Skin: warm and dry, no rash or erythema  Head: normocephalic and atraumatic  Eyes: pupils equal, round, and reactive to light, extraocular eye movements intact, conjunctivae normal  ENT: tympanic membrane, external ear and ear canal normal bilaterally, nose without deformity, nasal mucosa and turbinates normal without polyps  Neck: supple and non-tender without mass, no thyromegaly or thyroid nodules, no cervical lymphadenopathy  Pulmonary/Chest: clear to auscultation bilaterally- no wheezes, rales or rhonchi, normal air movement, no respiratory distress  Cardiovascular: normal rate, regular rhythm, normal S1 and S2, no murmurs, rubs, clicks, or gallops, distal pulses intact, no carotid bruits  Abdomen: soft, non-tender, non-distended, normal bowel sounds, no masses or organomegaly  Extremities: no cyanosis, clubbing or edema  Musculoskeletal: normal range of motion, no joint swelling, deformity or tenderness  Neurologic: reflexes normal and symmetric, no cranial nerve deficit, gait, coordination and speech normal      Assessment/Plan:  1. Chronic pain of right knee    - acetaminophen (APAP EXTRA STRENGTH) 500 MG tablet; One tab 4 times  Dispense: 120 tablet; Refill: 3    2. Abnormal EKG  -Resolved  - metoprolol succinate (TOPROL XL) 25 MG extended release tablet; Take 1 tablet by mouth daily  Dispense: 90 tablet; Refill: 1    3. Elevated pulse rate  -Resolved  - metoprolol succinate (TOPROL XL) 25 MG extended release tablet; Take 1 tablet by mouth daily  Dispense: 90 tablet; Refill: 1    4. Type 2 diabetes mellitus, uncontrolled, with neuropathy ([facility])  -70/ 30 Insulin- Increase AM dose 30 in am/ QPM 15 in evening  Monitor blood sugar 3-4 times daily  Obtain glucometer  Start MD Linda, Endocrinology, [address] [place] [address] Individual Diabetes Education (Non Care Coord Patient), [facility]  - dapagliflozin (FARXIGA) 10 MG tablet; Take 0.5 tablets by mouth every morning [id number] 04/2024  Dispense: 14 tablet;  Refill: 0  - FRUCTOSAMINE; Future  - C-PEPTIDE; Future  - MICROALBUMIN / CREATININE URINE RATIO; Future  - INSULIN ANTIBODY; Future    5. Diabetic polyneuropathy associated with type 2 diabetes mellitus (HCC)  -70/ 30 Insulin- Increase AM dose 30 in am/ QPM 15 in evening  Monitor blood sugar 3-4 times daily  Obtain glucometer  Start Farxiga   - gabapentin (NEURONTIN) 300 MG capsule; Take 2 capsules by mouth 3 times daily for 30 days. Dispense: 90 capsule; Refill: 3  - FRUCTOSAMINE; Future  - C-PEPTIDE; Future  - MICROALBUMIN / CREATININE URINE RATIO; Future  - INSULIN ANTIBODY; Future    6. Essential hypertension  -Start Diovan daily    7. Chest pain, unspecified type  -Resolved    8. Rapid heart beat  -Continue Metopolol    9. Diabetic ketoacidosis without coma associated with type 1 diabetes mellitus ([facility]  -Have labs drawn  - WI DISCHARGE MEDS RECONCILED W/ CURRENT OUTPATIENT MED LIST    10. Miscarriage  -Make appointment with Juan Luis Chappell from sexual activity  -Follow up with Gynecology  - WI DISCHARGE MEDS RECONCILED W/ CURRENT OUTPATIENT MED LIST    11. Hypertensive urgency  -Take Diovan daily  - WI DISCHARGE MEDS RECONCILED W/ CURRENT OUTPATIENT MED LIST    12. Uncontrolled type 2 diabetes mellitus with hyperglycemia (HCC)  -70/ 30 Insulin- Increase AM dose 30 in am/ QPM 15 in evening  Monitor blood sugar 3-4 times daily  Obtain glucometer  Start Farxiga   - valsartan (DIOVAN) 80 MG tablet; Take 1 tablet by mouth daily  Dispense: 30 tablet; Refill: 3  - glipiZIDE (GLUCOTROL) 10 MG tablet; Take 1 tablet by mouth 2 times daily (before meals)  Dispense: 60 tablet; Refill: 3  - dapagliflozin (FARXIGA) 10 MG tablet; Take 0.5 tablets by mouth every morning [id number] 04/2024  Dispense: 14 tablet; Refill: 0    13. Immunity status testing    - VARICELLA ZOSTER ANTIBODY, IGG; Future    14. Screening for thyroid disorder    - T4, Free; Future  - TSH with Reflex;  Future        Medical Decision Making: high complexity

## 2021-11-17 ENCOUNTER — TELEPHONE (OUTPATIENT)
Dept: PRIMARY CARE CLINIC | Age: 36
End: 2021-11-17

## 2021-11-17 NOTE — TELEPHONE ENCOUNTER
Patient needs a refill on ZANAFLEX 4 mg she was in the hospital and doctor gave her a week supply  of this medication and she is out of her ZANAFLEX  Please send to pharmacy please be advise

## 2021-11-18 DIAGNOSIS — M25.561 CHRONIC PAIN OF RIGHT KNEE: ICD-10-CM

## 2021-11-18 DIAGNOSIS — G89.29 CHRONIC PAIN OF RIGHT KNEE: ICD-10-CM

## 2021-11-18 NOTE — TELEPHONE ENCOUNTER
Patient need a refill on tiZANidine (ZANAFLEX) 4 MG tablet, she was given 21 pills while in the hospital, came in for hospital f/u on 11/11/21, now she needs refills

## 2021-11-19 ENCOUNTER — TELEPHONE (OUTPATIENT)
Dept: PHARMACY | Age: 36
End: 2021-11-19

## 2021-11-22 ENCOUNTER — VIRTUAL VISIT (OUTPATIENT)
Dept: PHARMACY | Age: 36
End: 2021-11-22
Payer: MEDICARE

## 2021-11-22 DIAGNOSIS — E11.65 UNCONTROLLED TYPE 2 DIABETES MELLITUS WITH HYPERGLYCEMIA (HCC): Primary | ICD-10-CM

## 2021-11-22 DIAGNOSIS — G89.29 CHRONIC PAIN OF RIGHT KNEE: ICD-10-CM

## 2021-11-22 DIAGNOSIS — M25.561 CHRONIC PAIN OF RIGHT KNEE: ICD-10-CM

## 2021-11-22 PROCEDURE — 99214 OFFICE O/P EST MOD 30 MIN: CPT

## 2021-11-22 RX ORDER — TIZANIDINE 4 MG/1
4 TABLET ORAL 2 TIMES DAILY PRN
Qty: 60 TABLET | Refills: 0 | Status: SHIPPED | OUTPATIENT
Start: 2021-11-22 | End: 2022-01-04

## 2021-11-22 NOTE — PROGRESS NOTES
or unspecified type diabetes mellitus without mention of complication, not stated as uncontrolled     Unspecified noninflammatory disorder of vulva and perineum        HPI:   1. Uncontrolled type 2 diabetes mellitus with hyperglycemia (HCC)        Social History     Tobacco Use    Smoking status: Never Smoker    Smokeless tobacco: Never Used   Substance Use Topics    Alcohol use: No       Pertinent Labs:    Lab Results   Component Value Date    LABA1C 12.2 11/03/2021    LABA1C 12.5 02/02/2021    LABA1C 12.2 11/20/2018       Lab Results   Component Value Date    CREATININE 0.6 11/03/2021    BUN 9 11/03/2021     (L) 11/03/2021    K 3.8 11/03/2021    CL 96 (L) 11/03/2021    CO2 20 (L) 11/03/2021       Lab Results   Component Value Date    MALBCR 10.4 02/28/2013       Lab Results   Component Value Date    CHOL 142 07/22/2014    TRIG 118 07/22/2014    HDL 57 07/22/2014    LDLCALC 61 07/22/2014     No results found for: LDLDIRECT    Lab Results   Component Value Date    ALT 10 11/02/2021       Weight:  Wt Readings from Last 3 Encounters:   11/11/21 294 lb 6.4 oz (133.5 kg)   11/04/21 (!) 308 lb 6.8 oz (139.9 kg)   10/25/21 293 lb 3.4 oz (133 kg)       Immunizations:   Most Recent Immunizations   Administered Date(s) Administered    COVID-19, Pfizer, PF, 30mcg/0.3mL 11/04/2021       Blood Glucose Assessment     Home Blood Glucose Results:     Has been keeping track, but left this upstairs. She is now downstairs. Checks BG in the am and at bedtime. Last evening BG at 9pm was > 300. At least one hr after eating. Over the past week, lowest BG in the 80's in the afternoon. Prior to her last appt with her PCP, was having BG readings that were \"Hi\". No BG readings > 400 since her last PCP appt.      Thorough Medication Assessment     Current medications:  Current Outpatient Medications   Medication Sig Dispense Refill    tiZANidine (ZANAFLEX) 4 MG tablet Take 1 tablet by mouth 2 times daily as goal is to be \"more consistent. \"    She admits to not liking shots. It hurts where she gives the shot. She is not taking metformin due to diarrhea. I asked if she was open to trying the extended release formula. She is open to this. This was discouraged by Dr. Adilia Rose in the past. She doesn't remember why. Encouraged to set goals. Big and small. Include her children in her plans and care. She made a comment about checking her BG at least one hour after dinner and then taking her insulin. Patient's current eating patterns:   Did not go into specifics.     - Beverages - She was drinking ~ 4 regular sodas per day. Has cut back and drinking some diet instead. Mireille Garvey Physical current activity:   - did not talk about specifics. Encouraged activity. Weight management plan:   - encouraged weight loss. General Diabetes Education:   Symptoms  A1c target  Weight loss  Physical Activity with a goal of 30 minutes 5 times a week  Healthy eating patterns, My Plate Method of Eating, and Carbohydrate Counting  Blood Glucose and Meal Log  Management of low and high blood glucose readings  Blood pressure control with a goal of < 130/80  Cholesterol control and possible statin medication  Annual Microalbumin / Creatinine Ratio, annual eye exam, and comprehensive annual foot exam and proper self foot care  Vaccinations    Diabetes booklet given: yes, mailed     - Current Smoking Assessment:   Non-smoker    Physician Follow-up for Diabetes: Yes      Further Assessment / Plan     1. Uncontrolled type 2 diabetes mellitus with hyperglycemia ([facility]        - Diabetes Medication Changes or Recommendations Made Today:   no medication changes.       Take you Humulin 70/30 insulin 30 minutes before a meal.     Sent info on healthy sleeping habits.     - Weight Loss Plan:   Decrease portions, Increase healthy choices and Reduce soda    - Physical Activity Plan:   goal of 30 minutes 5 days a week    - Hypertension:   Blood pressure goal less than 130/80    - Hyperlipidemia:   educated about lifestyle modification including reducing saturated fats, choosing lean meats, fruits and vegetables, nuts, whole grains, and regular activity    - Smoking Cessation Plan  Non-smoker    - Annual Microalbumin / Creatinine Urine Ratio:   ARB    - Reinforced importance of an annual eye exam and annual comprehensive foot exam - wears glasses    - Annual influenza vaccine:   Recommended annual influenza vaccine during influenza season. - COVID-19 vaccine:  Current with initial COVID-19 vaccine series    - Change in eating pattern:  Work to eliminate sugary beverages  Mediterranean eating pattern  Low carbohydrates   Carbohydrate counting  My Plate method of eating    Recommendations to the physician:  - consider metformin ER 500mg daily     [address] Follow-up   Diabetes Service   Yes    Electronically signed by Lorene Cavazos PharmD on 2021 at 3:11 PM      CLINICAL PHARMACY CONSULT: MED RECONCILIATION/REVIEW [address] [facility] in place:   Yes   Recommendation Provided To: Patient/Caregiver: 3 via Telephone   Intervention Detail: Adherence Monitorin and Vaccine Recommended/Administered   Gap Closed?: No    Total # of Interventions Recommended: 3   Total # of Interventions Accepted: 0   Intervention Accepted By: Patient/Caregiver: 0   Time Spent (min): 75

## 2021-11-22 NOTE — PATIENT INSTRUCTIONS
1. Record your sugars and your insulin doses on the attached log sheet. You can also log your meals and carbohydrates. Take this to your appointments. 2. Start looking at carbohydrates and work to keep these in the recommended range. 3. Continue to f/u with your PCP. 4. Take you Humulin 70/30 insulin 30 minutes before a meal.     1. Make these medication changes:   - no changes     2. Work to lose weight. This will help improve blood sugar control. 3. Work to improve or maintain Physical Activity:    - goal of 30 minutes 5 days a week. 4. Keep good blood pressure control:   - goal < 130/80    5. Keep good cholesterol control    6. Work to quit smoking or remain smoke free    7. Your doctor will check a urine test each year called a Microalbumin / Creatinine Urine Ratio. This looks at how Diabetes may be affecting your kidney function. 8. You should get an eye exam every year    9. Your doctor will perform a comprehensive foot exam every year. Follow the foot care recommendations provided in our Diabetes booklet. 10. Get your flu vaccine every year during flu season usual starting in October    11. Get your pneumonia vaccine. Please see our booklet for recommended vaccines and how often. 12. Make these changes to your eating and drinking patterns:  [x] Work to eliminate sugary beverages  [x] Mediterranean eating pattern  [x] Low carbohydrate eating pattern  [x] My plate method  [x] Carbohydrate counting    13. Please seek medical advice if you have blood sugars that run in the 300's or above. 14. If you have symptoms of low blood sugar such as feeling shaky, lightheaded, dizzy, sleepy, or confused then test your sugar. 15. If you have low blood sugar of less than 70mg/dL, then eat or drink something with 15 grams of carbohydrates like a half cup of juice (4 oz) or 3-4 glucose tablets. Recheck your sugar in 15 minutes.  If your blood glucose is less than 70mg/dL again, have another 15 grams of carbohydrates. Continue until above 70mg/dL. Call your physician if you experience low blood sugars. 16. Please call any time with questions or concerns at [phone].

## 2021-11-29 ENCOUNTER — VIRTUAL VISIT (OUTPATIENT)
Dept: PRIMARY CARE CLINIC | Age: 36
End: 2021-11-29
Payer: MEDICARE

## 2021-11-29 DIAGNOSIS — M25.561 CHRONIC PAIN OF RIGHT KNEE: ICD-10-CM

## 2021-11-29 DIAGNOSIS — G89.29 CHRONIC PAIN OF RIGHT KNEE: ICD-10-CM

## 2021-11-29 DIAGNOSIS — E11.42 DIABETIC POLYNEUROPATHY ASSOCIATED WITH TYPE 2 DIABETES MELLITUS (HCC): ICD-10-CM

## 2021-11-29 DIAGNOSIS — I10 ESSENTIAL HYPERTENSION: ICD-10-CM

## 2021-11-29 DIAGNOSIS — R00.0 ELEVATED PULSE RATE: ICD-10-CM

## 2021-11-29 DIAGNOSIS — E11.65 UNCONTROLLED TYPE 2 DIABETES MELLITUS WITH HYPERGLYCEMIA (HCC): Primary | ICD-10-CM

## 2021-11-29 DIAGNOSIS — R94.31 ABNORMAL EKG: ICD-10-CM

## 2021-11-29 PROCEDURE — 1036F TOBACCO NON-USER: CPT | Performed by: NURSE PRACTITIONER

## 2021-11-29 PROCEDURE — 2022F DILAT RTA XM EVC RTNOPTHY: CPT | Performed by: NURSE PRACTITIONER

## 2021-11-29 PROCEDURE — G8484 FLU IMMUNIZE NO ADMIN: HCPCS | Performed by: NURSE PRACTITIONER

## 2021-11-29 PROCEDURE — 1111F DSCHRG MED/CURRENT MED MERGE: CPT | Performed by: NURSE PRACTITIONER

## 2021-11-29 PROCEDURE — G8427 DOCREV CUR MEDS BY ELIG CLIN: HCPCS | Performed by: NURSE PRACTITIONER

## 2021-11-29 PROCEDURE — 99214 OFFICE O/P EST MOD 30 MIN: CPT | Performed by: NURSE PRACTITIONER

## 2021-11-29 PROCEDURE — G8417 CALC BMI ABV UP PARAM F/U: HCPCS | Performed by: NURSE PRACTITIONER

## 2021-11-29 PROCEDURE — 3046F HEMOGLOBIN A1C LEVEL >9.0%: CPT | Performed by: NURSE PRACTITIONER

## 2021-11-29 RX ORDER — GABAPENTIN 600 MG/1
600 TABLET ORAL 3 TIMES DAILY
Qty: 90 TABLET | Refills: 3 | Status: SHIPPED | OUTPATIENT
Start: 2021-11-29 | End: 2022-06-29 | Stop reason: SDUPTHER

## 2021-11-29 RX ORDER — GLIPIZIDE 10 MG/1
10 TABLET ORAL
Qty: 60 TABLET | Refills: 3 | Status: CANCELLED | OUTPATIENT
Start: 2021-11-29 | End: 2021-12-29

## 2021-11-29 RX ORDER — METOPROLOL SUCCINATE 25 MG/1
25 TABLET, EXTENDED RELEASE ORAL DAILY
Qty: 90 TABLET | Refills: 1 | Status: SHIPPED | OUTPATIENT
Start: 2021-11-29 | End: 2022-06-29 | Stop reason: SDUPTHER

## 2021-11-29 RX ORDER — AMLODIPINE BESYLATE 10 MG/1
10 TABLET ORAL DAILY
Qty: 7 TABLET | Refills: 0 | Status: CANCELLED | OUTPATIENT
Start: 2021-11-29 | End: 2021-12-06

## 2021-11-29 RX ORDER — AMITRIPTYLINE HYDROCHLORIDE 25 MG/1
25 TABLET, FILM COATED ORAL NIGHTLY
Qty: 30 TABLET | Refills: 0 | Status: CANCELLED | OUTPATIENT
Start: 2021-11-29 | End: 2021-12-29

## 2021-11-29 RX ORDER — INSULIN HUMAN 100 [IU]/ML
INJECTION, SUSPENSION SUBCUTANEOUS
Qty: 5 PEN | Refills: 0 | Status: CANCELLED | OUTPATIENT
Start: 2021-11-29

## 2021-11-29 RX ORDER — DULOXETIN HYDROCHLORIDE 20 MG/1
CAPSULE, DELAYED RELEASE ORAL
Qty: 30 CAPSULE | Refills: 3 | Status: CANCELLED | OUTPATIENT
Start: 2021-11-29

## 2021-11-29 RX ORDER — ACETAMINOPHEN 500 MG
TABLET ORAL
Qty: 120 TABLET | Refills: 3 | Status: CANCELLED | OUTPATIENT
Start: 2021-11-29

## 2021-11-29 RX ORDER — GABAPENTIN 300 MG/1
600 CAPSULE ORAL 3 TIMES DAILY
Qty: 90 CAPSULE | Refills: 3 | Status: CANCELLED | OUTPATIENT
Start: 2021-11-29 | End: 2021-12-29

## 2021-11-29 NOTE — PROGRESS NOTES
Krysten Cole (:  1985) is a 39 y.o. female,Established patient, here for evaluation of the following chief complaint(s): 2 Week Follow-Up         ASSESSMENT/PLAN:  1. Chronic pain of right knee  -     gabapentin (NEURONTIN) 600 MG tablet; Take 1 tablet by mouth 3 times daily for 30 days. , Disp-90 tablet, R-3Normal  2. Essential hypertension  -Continue current medications. 3. Type 2 diabetes mellitus, uncontrolled, with neuropathy (HCC)  -Continue current medications. -     dapagliflozin (FARXIGA) 5 MG tablet; Take 1 tablet by mouth every morning [id number] 2024, Disp-90 tablet, R-1Normal  -     URINALYSIS; Future  4. Uncontrolled type 2 diabetes mellitus with hyperglycemia (HCC)  -Continue current medications. -     dapagliflozin (FARXIGA) 5 MG tablet; Take 1 tablet by mouth every morning [id number] 2024, Disp-90 tablet, R-1Normal  -     URINALYSIS; Future  6. Diabetic polyneuropathy associated with type 2 diabetes mellitus ([facility]  -Continue current medications. -     gabapentin (NEURONTIN) 600 MG tablet; Take 1 tablet by mouth 3 times daily for 30 days. , Disp-90 tablet, R-3Normal  7. Abnormal EKG  -Resolved  8. Elevated pulse rate  -     metoprolol succinate (TOPROL XL) 25 MG extended release tablet; Take 1 tablet by mouth daily, Disp-90 tablet, R-1Normal      No follow-ups on file. SUBJECTIVE/OBJECTIVE:  Hypertension  This is a chronic problem. The current episode started more than 1 year ago. The problem is uncontrolled. Pertinent negatives include no chest pain, headaches, palpitations or shortness of breath. Risk factors for coronary artery disease include diabetes mellitus, stress and obesity. Past treatments include beta blockers, angiotensin blockers, calcium channel blockers and lifestyle changes. The current treatment provides mild improvement. Compliance problems include diet and exercise. Diabetes  She presents for her follow-up diabetic visit. She has type 2 diabetes mellitus.  Her disease course has been stable. There are no hypoglycemic associated symptoms. Pertinent negatives for hypoglycemia include no dizziness, headaches or nervousness/anxiousness. There are no diabetic associated symptoms. Pertinent negatives for diabetes include no chest pain and no polyuria. There are no hypoglycemic complications. Symptoms are stable. Diabetic complications include peripheral neuropathy. Risk factors for coronary artery disease include diabetes mellitus, hypertension and obesity. Current diabetic treatment includes insulin injections and oral agent (triple therapy). She is compliant with treatment most of the time. Her weight is stable. Meal planning includes avoidance of concentrated sweets. An ACE inhibitor/angiotensin II receptor blocker is being taken. NPH 70/30 taking 30 units at bedtime and 20 units in the morning. Has not taken Metformin due to side effects, upsets stomach. 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 Dietitian sent information through the mail regarding diet. Last week blood sugar was 200-300. Blood sugar has decreased since stopped drinking Soda. Apppointment with Endocrinolgy in January. Monitor blood sugar 3-4 times daily        Review of Systems   Constitutional: Negative for activity change and fever. HENT: Negative for congestion. Eyes: Negative for visual disturbance. Respiratory: Negative for chest tightness and shortness of breath. Cardiovascular: Negative for chest pain, palpitations and leg swelling. Hypertension   Gastrointestinal: Negative for abdominal pain, constipation and diarrhea. Endocrine: Negative for polyuria. Diabetes   Genitourinary: Negative for dysuria. Musculoskeletal: Negative for arthralgias and myalgias. Skin: Negative for rash. Neurological: Negative for dizziness, light-headedness and headaches.    Psychiatric/Behavioral: Negative for agitation, decreased concentration and sleep disturbance. The patient is not nervous/anxious. Patient-Reported Vitals 11/29/2021   Patient-Reported Weight 290 lb   Patient-Reported Height 5 7      Limited due to virtual visit  Physical Exam  Vitals reviewed. Constitutional:       Appearance: She is well-developed. HENT:      Head: Normocephalic. Right Ear: Hearing and external ear normal.      Left Ear: Hearing and external ear normal.      Nose: Nose normal.   Eyes:      General: Lids are normal. Vision grossly intact. Pulmonary:      Effort: Pulmonary effort is normal.   Skin:     Findings: No rash. Neurological:      Mental Status: She is alert and oriented to person, place, and time. GCS: GCS eye subscore is 4. GCS verbal subscore is 5. GCS motor subscore is 6. Psychiatric:         Speech: Speech normal.         Behavior: Behavior normal. Behavior is cooperative. On this date 11/29/2021 I have spent 25 minutes reviewing previous notes, test results and face to face (virtual) with the patient discussing the diagnosis and importance of compliance with the treatment plan as well as documenting on the day of the visit. Lalitha Parada, was evaluated through a synchronous (real-time) audio-video encounter. The patient (or guardian if applicable) is aware that this is a billable service. Verbal consent to proceed has been obtained within the past 12 months. The visit was conducted pursuant to the emergency declaration under the [facility], [address] [place] [address] authority and the [employer] General Act. Patient identification was verified, and a caregiver was present when appropriate. The patient was located in a state where the provider was credentialed to provide care. An electronic signature was used to authenticate this note.     --Mark Chapin, APRN - CNP

## 2021-11-30 PROBLEM — E10.10 DKA, TYPE 1, NOT AT GOAL (HCC): Status: RESOLVED | Noted: 2021-11-02 | Resolved: 2021-11-30

## 2021-11-30 PROBLEM — I10 ESSENTIAL HYPERTENSION: Status: ACTIVE | Noted: 2021-11-30

## 2021-11-30 PROBLEM — E11.65 UNCONTROLLED TYPE 2 DIABETES MELLITUS WITH HYPERGLYCEMIA (HCC): Status: ACTIVE | Noted: 2021-11-30

## 2021-11-30 PROBLEM — E11.10 DKA (DIABETIC KETOACIDOSIS) (HCC): Status: RESOLVED | Noted: 2021-11-02 | Resolved: 2021-11-30

## 2021-11-30 ASSESSMENT — ENCOUNTER SYMPTOMS
ABDOMINAL PAIN: 0
CHEST TIGHTNESS: 0
SHORTNESS OF BREATH: 0
CONSTIPATION: 0
DIARRHEA: 0

## 2021-11-30 ASSESSMENT — VISUAL ACUITY: OU: 1

## 2021-12-06 ENCOUNTER — TELEPHONE (OUTPATIENT)
Dept: PHARMACY | Age: 36
End: 2021-12-06

## 2021-12-06 NOTE — TELEPHONE ENCOUNTER
I tried to reach Cassie for our virtual visit today. No answer at 9:10 and no answer at 9:20am.    Left a message each time to please return my call. At this time we will have to reschedule.      Santhosh Ireland, PharmD, [address] [place] [address] Service  [phone]

## 2021-12-30 DIAGNOSIS — M25.561 CHRONIC PAIN OF RIGHT KNEE: ICD-10-CM

## 2021-12-30 DIAGNOSIS — G89.29 CHRONIC PAIN OF RIGHT KNEE: ICD-10-CM

## 2021-12-30 NOTE — TELEPHONE ENCOUNTER
Medication:   Requested Prescriptions     Pending Prescriptions Disp Refills    tiZANidine (ZANAFLEX) 4 MG tablet [Pharmacy Med Name: tiZANidine HCL 4MG TABLET] 60 tablet 0     Sig: TAKE ONE TABLET BY MOUTH TWICE A DAY AS NEEDED FOR MUSCLE PAIN        Last Filled:      Patient Phone Number: [phone] (home) [phone] (work)    Last appt: 11/29/2021   Next appt: Visit date not found    Last OARRS: No flowsheet data found.

## 2022-01-04 RX ORDER — TIZANIDINE 4 MG/1
TABLET ORAL
Qty: 60 TABLET | Refills: 0 | Status: SHIPPED | OUTPATIENT
Start: 2022-01-04 | End: 2022-02-14

## 2022-02-09 DIAGNOSIS — M25.561 CHRONIC PAIN OF RIGHT KNEE: ICD-10-CM

## 2022-02-09 DIAGNOSIS — G89.29 CHRONIC PAIN OF RIGHT KNEE: ICD-10-CM

## 2022-02-09 NOTE — TELEPHONE ENCOUNTER
Medication:   Requested Prescriptions     Pending Prescriptions Disp Refills    tiZANidine (ZANAFLEX) 4 MG tablet [Pharmacy Med Name: tiZANidine HCL 4MG TABLET] 60 tablet 0     Sig: TAKE ONE TABLET BY MOUTH TWICE A DAY AS NEEDED FOR MUSCLE PAIN        Last Filled:      Patient Phone Number: [phone] (home) [phone] (work)    Last appt: 11/29/2021   Next appt: Visit date not found    Last OARRS: No flowsheet data found.

## 2022-02-11 DIAGNOSIS — M25.561 CHRONIC PAIN OF RIGHT KNEE: ICD-10-CM

## 2022-02-11 DIAGNOSIS — G89.29 CHRONIC PAIN OF RIGHT KNEE: ICD-10-CM

## 2022-02-11 NOTE — TELEPHONE ENCOUNTER
Medication:   Requested Prescriptions     Pending Prescriptions Disp Refills    tiZANidine (ZANAFLEX) 4 MG tablet 60 tablet 0        Last Filled:      Patient Phone Number: [phone] (home) [phone] (work)    Last appt: 11/29/2021   Next appt: Visit date not found    Last OARRS: No flowsheet data found.

## 2022-02-14 RX ORDER — TIZANIDINE 4 MG/1
TABLET ORAL
Qty: 60 TABLET | Refills: 0 | OUTPATIENT
Start: 2022-02-14

## 2022-02-14 RX ORDER — TIZANIDINE 4 MG/1
TABLET ORAL
Qty: 60 TABLET | Refills: 0 | Status: SHIPPED | OUTPATIENT
Start: 2022-02-14 | End: 2022-03-18

## 2022-02-22 DIAGNOSIS — Z79.4 TYPE 2 DIABETES MELLITUS WITHOUT COMPLICATION, WITH LONG-TERM CURRENT USE OF INSULIN (HCC): ICD-10-CM

## 2022-02-22 DIAGNOSIS — E11.9 TYPE 2 DIABETES MELLITUS WITHOUT COMPLICATION, WITH LONG-TERM CURRENT USE OF INSULIN (HCC): ICD-10-CM

## 2022-02-23 RX ORDER — DULOXETIN HYDROCHLORIDE 20 MG/1
CAPSULE, DELAYED RELEASE ORAL
Qty: 30 CAPSULE | Refills: 3 | Status: SHIPPED | OUTPATIENT
Start: 2022-02-23 | End: 2022-06-29

## 2022-03-13 ENCOUNTER — APPOINTMENT (OUTPATIENT)
Dept: GENERAL RADIOLOGY | Age: 37
End: 2022-03-13
Payer: MEDICARE

## 2022-03-13 ENCOUNTER — HOSPITAL ENCOUNTER (EMERGENCY)
Age: 37
Discharge: HOME | End: 2022-03-13
Payer: MEDICARE

## 2022-03-13 VITALS
DIASTOLIC BLOOD PRESSURE: 120 MMHG | BODY MASS INDEX: 45.52 KG/M2 | WEIGHT: 290 LBS | HEIGHT: 67 IN | OXYGEN SATURATION: 99 % | TEMPERATURE: 96.9 F | SYSTOLIC BLOOD PRESSURE: 188 MMHG | HEART RATE: 92 BPM | RESPIRATION RATE: 18 BRPM

## 2022-03-13 DIAGNOSIS — S91.209A NAIL AVULSION OF TOE, INITIAL ENCOUNTER: Primary | ICD-10-CM

## 2022-03-13 LAB
CHP ED QC CHECK: YES
GLUCOSE BLD-MCNC: 427 MG/DL
GLUCOSE BLD-MCNC: 427 MG/DL (ref 70–99)
PERFORMED ON: ABNORMAL

## 2022-03-13 PROCEDURE — 73660 X-RAY EXAM OF TOE(S): CPT

## 2022-03-13 PROCEDURE — 99284 EMERGENCY DEPT VISIT MOD MDM: CPT

## 2022-03-13 PROCEDURE — 6370000000 HC RX 637 (ALT 250 FOR IP): Performed by: PHYSICIAN ASSISTANT

## 2022-03-13 RX ORDER — IBUPROFEN 800 MG/1
800 TABLET ORAL EVERY 8 HOURS PRN
Qty: 20 TABLET | Refills: 0 | Status: SHIPPED | OUTPATIENT
Start: 2022-03-13 | End: 2022-07-14

## 2022-03-13 RX ORDER — CEPHALEXIN 500 MG/1
500 CAPSULE ORAL ONCE
Status: COMPLETED | OUTPATIENT
Start: 2022-03-13 | End: 2022-03-13

## 2022-03-13 RX ORDER — CEPHALEXIN 500 MG/1
500 CAPSULE ORAL 4 TIMES DAILY
Qty: 28 CAPSULE | Refills: 0 | Status: SHIPPED | OUTPATIENT
Start: 2022-03-13 | End: 2022-03-20

## 2022-03-13 RX ORDER — HYDROCODONE BITARTRATE AND ACETAMINOPHEN 5; 325 MG/1; MG/1
1 TABLET ORAL EVERY 6 HOURS PRN
Qty: 8 TABLET | Refills: 0 | Status: SHIPPED | OUTPATIENT
Start: 2022-03-13 | End: 2022-03-18

## 2022-03-13 RX ORDER — IBUPROFEN 400 MG/1
800 TABLET ORAL ONCE
Status: COMPLETED | OUTPATIENT
Start: 2022-03-13 | End: 2022-03-13

## 2022-03-13 RX ADMIN — CEPHALEXIN 500 MG: 500 CAPSULE ORAL at 13:25

## 2022-03-13 RX ADMIN — IBUPROFEN 800 MG: 400 TABLET, FILM COATED ORAL at 13:25

## 2022-03-13 ASSESSMENT — PAIN DESCRIPTION - DESCRIPTORS: DESCRIPTORS: BURNING;THROBBING

## 2022-03-13 ASSESSMENT — PAIN DESCRIPTION - ORIENTATION: ORIENTATION: RIGHT;LEFT

## 2022-03-13 ASSESSMENT — PAIN SCALES - GENERAL
PAINLEVEL_OUTOF10: 6
PAINLEVEL_OUTOF10: 6

## 2022-03-13 ASSESSMENT — PAIN DESCRIPTION - LOCATION: LOCATION: HAND;FOOT

## 2022-03-13 ASSESSMENT — PAIN DESCRIPTION - FREQUENCY: FREQUENCY: CONTINUOUS

## 2022-03-13 ASSESSMENT — PAIN DESCRIPTION - PAIN TYPE: TYPE: ACUTE PAIN

## 2022-03-13 ASSESSMENT — PAIN - FUNCTIONAL ASSESSMENT: PAIN_FUNCTIONAL_ASSESSMENT: PREVENTS OR INTERFERES SOME ACTIVE ACTIVITIES AND ADLS

## 2022-03-13 ASSESSMENT — PAIN SCALES - WONG BAKER: WONGBAKER_NUMERICALRESPONSE: 6

## 2022-03-13 NOTE — ED PROVIDER NOTES
[address] [facility]        Pt Name: Natalee Short  MRN: [id number]  [place] 1985  Date of evaluation: 3/13/2022  Provider: PEPE Nunez  PCP: ALIYA Slater CNP  Note Started: 1:16 PM EDT     The ED Attending Physician was available for consultation but did not see or evaluate this patient. CHIEF COMPLAINT       Chief Complaint   Patient presents with    Foot Injury     Pt. concerned for infection in R great toe after loosing toenail and c/o generalized swelling/edema. HISTORY OF PRESENT ILLNESS   (Location, Timing/Onset, Context/Setting, Quality, Duration, Modifying Factors, Severity, Associated Signs and Symptoms)  Note limiting factors. Chief Complaint: Right great toe injury, pain, swelling, nail avulsion    Natalee Short is a 40 y.o. female who presents reporting that 2 days ago while at work a cart ran over her foot, specifically the big toe, and it caused pain. She says yesterday the toenail came off, and there was some swelling and some fluid discharge beneath the nail. She says today it is a little less swollen than it was yesterday but is still quite painful, looks like it might be infected, and she is very concerned that she has been losing her toe. She says she is an insulin-dependent diabetic, thinks her sugar is not very well controlled. Denies any numbness. Denies bleeding. Denies injury to any other parts of the body. Nursing Notes were all reviewed and agreed with or any disagreements were addressed in the HPI. REVIEW OF SYSTEMS    (2-9 systems for level 4, 10 or more for level 5)     Review of Systems    Positives and pertinent negatives as per HPI.      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        SURGICAL HISTORY     Past Surgical History:   Procedure Laterality Date    APPENDECTOMY  12/14/2018     Laparoscopic appendectomy     CHOLECYSTECTOMY, LAPAROSCOPIC  2/07    LAPAROSCOPIC APPENDECTOMY N/A 12/14/2018    LAPAROSCOPIC APPENDECTOMY performed by Genevieve Ledezma MD at [address] [place] [address]  07/18/2014    COLPOSCOPY OF VULVA, VAGINA AND CERVIX WITH CO-2 LASER       CURRENTMEDICATIONS       Discharge Medication List as of 3/13/2022  3:19 PM      CONTINUE these medications which have NOT CHANGED    Details   DULoxetine (CYMBALTA) 20 MG extended release capsule TAKE ONE CAPSULE BY MOUTH DAILY, Disp-30 capsule, R-3Normal      tiZANidine (ZANAFLEX) 4 MG tablet TAKE ONE TABLET BY MOUTH TWICE A DAY AS NEEDED FOR MUSCLE PAIN, Disp-60 tablet, R-0Normal      amitriptyline (ELAVIL) 25 MG tablet TAKE ONE TABLET BY MOUTH EVERY NIGHT AT BEDTIME, Disp-30 tablet, R-3Normal      dapagliflozin (FARXIGA) 5 MG tablet Take 1 tablet by mouth every morning [id number] 04/2024, Disp-90 tablet, R-1Normal      metoprolol succinate (TOPROL XL) 25 MG extended release tablet Take 1 tablet by mouth daily, Disp-90 tablet, R-1Normal      gabapentin (NEURONTIN) 600 MG tablet Take 1 tablet by mouth 3 times daily for 30 days. , Disp-90 tablet, R-3Normal      acetaminophen (APAP EXTRA STRENGTH) 500 MG tablet One tab 4 times, Disp-120 tablet, R-3Normal      valsartan (DIOVAN) 80 MG tablet Take 1 tablet by mouth daily, Disp-30 tablet, R-3Normal      gabapentin (NEURONTIN) 300 MG capsule Take 2 capsules by mouth 3 times daily for 30 days. , Disp-90 capsule, R-3Normal      glipiZIDE (GLUCOTROL) 10 MG tablet Take 1 tablet by mouth 2 times daily (before meals), Disp-60 tablet, R-3Make office appointmentNormal      Insulin NPH Isophane & Regular (HUMULIN 70/30 KWIKPEN) (70-30) 100 UNIT per ML injection pen 30 units under skin AM before breakfast  And 10 units under skin PM before dinner, Disp-5 pen, R-0Print      amLODIPine (NORVASC) 10 MG tablet Take 1 tablet by mouth daily for 7 days, Disp-7 tablet, R-0Normal             ALLERGIES     Lisinopril and Augmentin [amoxicillin-pot clavulanate]    FAMILYHISTORY       Family History   Problem Relation Age of Onset    High Blood Pressure Mother     High Blood Pressure Father     Asthma Father     Cancer Maternal Grandmother         breast    Breast Cancer Maternal Grandmother     Diabetes Paternal Grandmother         SOCIAL HISTORY       Social History     Tobacco Use    Smoking status: Never Smoker    Smokeless tobacco: Never Used   Substance Use Topics    Alcohol use: No    Drug use: Not Currently     Types: Marijuana (Weed)       SCREENINGS    Moore Coma Scale  Eye Opening: Spontaneous  Best Verbal Response: Oriented  Best Motor Response: Obeys commands  Mika Coma Scale Score: 15      PHYSICAL EXAM    (up to 7 for level 4, 8 or more for level 5)     ED Triage Vitals [03/13/22 1303]   BP Temp Temp Source Pulse Resp SpO2 Height Weight   (!) 188/120 96.9 °F (36.1 °C) Oral 92 18 99 % 5' 7\" (1.702 m) 290 lb (131.5 kg)       Physical Exam  Vitals and nursing note reviewed. Constitutional:       General: She is not in acute distress. Appearance: Normal appearance. She is not ill-appearing. HENT:      Head: Normocephalic and atraumatic. Nose: Nose normal.   Eyes:      General:         Right eye: No discharge. Left eye: No discharge. Pulmonary:      Effort: Pulmonary effort is normal. No respiratory distress. Musculoskeletal:         General: Normal range of motion. Cervical back: Normal range of motion. Comments: Complete avulsion of the nail of the right great toe, with some superficial skin abraded, no active bleeding, no fluctuance. Mildly swollen. Tender to palpation. See image below.   Capillary refill less than 3 seconds and sensation to light touch grossly intact throughout the affected toe. Normal examination of the right foot otherwise. Skin:     General: Skin is warm and dry. Neurological:      General: No focal deficit present. Mental Status: She is alert and oriented to person, place, and time. Psychiatric:         Mood and Affect: Mood normal.         Behavior: Behavior normal.             DIAGNOSTIC RESULTS   LABS:    Labs Reviewed   POCT GLUCOSE - Abnormal; Notable for the following components:       Result Value    POC Glucose 427 (*)     All other components within normal limits   POCT GLUCOSE - Normal       When ordered only abnormal lab results are displayed. All other labs were within normal range or not returned as of this dictation. EKG: When ordered, EKG's are interpreted by the Emergency Department Physician in the absence of a cardiologist.  Please see their note for interpretation of EKG. RADIOLOGY:   Non-plain film images such as CT, Ultrasound and MRI are read by the radiologist. Plain radiographic images are visualized and preliminarily interpreted by the ED Provider with the below findings:    Interpretation per the Radiologist below, if available at the time of this note:    XR TOE LEFT (MIN 2 VIEWS)   Final Result   1. No fracture or dislocation. 2. No acute soft tissue abnormality appreciated. 3. No retained radiopaque foreign body. Follow-up imaging recommended if pain persists or worsens following   conservative management. CONSULTS:  None    PROCEDURES   Unless otherwise noted below, none.      Procedures    EMERGENCY DEPARTMENT COURSE and DIFFERENTIAL DIAGNOSIS/MDM:   Vitals:    Vitals:    03/13/22 1303   BP: (!) 188/120   Pulse: 92   Resp: 18   Temp: 96.9 °F (36.1 °C)   TempSrc: Oral   SpO2: 99%   Weight: 290 lb (131.5 kg)   Height: 5' 7\" (1.702 m)       Patient was given the following medications:  Medications   cephALEXin (KEFLEX) capsule 500 mg (500 mg Oral Given 3/13/22 1325)   ibuprofen (ADVIL;MOTRIN) tablet 800 mg (800 mg Oral Given 3/13/22 1325)           X-ray negative for fracture. No abscess. Toenail completely avulsed. Patient will be treated for possible infection, with first dose of antibiotic given in the ED. She will be discharged with prescriptions for pain medication as well, and given referral for podiatry. The patient verbalized understanding and agreement with this plan of care. The patient was advised to return to the emergency department if symptoms should significantly worsen or if new and concerning symptoms should appear. I estimate there is LOW risk for UNSTABLE FRACTURE, COMPARTMENT SYNDROME, DEEP VENOUS THROMBOSIS, SEPTIC ARTHRITIS, NEUROVASCULAR COMPROMISE, TENDON/LIGAMENT RUPTURE, SEVERE CELLULITIS, SEPSIS or NECROTIZING FASCIITIS, thus I consider the discharge disposition reasonable. CRITICAL CARE TIME   None    FINAL IMPRESSION      1. Nail avulsion of toe, initial encounter          DISPOSITION/PLAN   DISPOSITION Decision To Discharge 03/13/2022 03:07:20 PM      PATIENT REFERRED TO:  Elen Mendoza DPM  [address]  [address]    Schedule an appointment as soon as possible for a visit   For podiatry follow-up care      DISCHARGE MEDICATIONS:  Discharge Medication List as of 3/13/2022  3:19 PM      START taking these medications    Details   cephALEXin (KEFLEX) 500 MG capsule Take 1 capsule by mouth 4 times daily for 7 days, Disp-28 capsule, R-0Print      ibuprofen (ADVIL;MOTRIN) 800 MG tablet Take 1 tablet by mouth every 8 hours as needed for Pain, Disp-20 tablet, R-0Print      HYDROcodone-acetaminophen (NORCO) 5-325 MG per tablet Take 1 tablet by mouth every 6 hours as needed (for extra pain control) for up to 5 days. , Disp-8 tablet, R-0Print             DISCONTINUED MEDICATIONS:  Discharge Medication List as of 3/13/2022  3:19 PM               (Please note that portions of this note were completed with a voice recognition program.  Efforts were made to edit the dictations but occasionally words are mis-transcribed.)    PEPE Squires (electronically signed)       Edgar Squires  03/13/22 2054

## 2022-03-13 NOTE — ED TRIAGE NOTES
Pt. presents to ED with R great toe nail that has fallen off after she accidentally ran over her toe with cart at work Fri. Toenail fell off yesterday and pt. states \"there was a bunch of fluid that oozed out\". Pt. concerned for infection. Pt. Has DMII. Sugar to be checked.

## 2022-03-17 DIAGNOSIS — M25.561 CHRONIC PAIN OF RIGHT KNEE: ICD-10-CM

## 2022-03-17 DIAGNOSIS — G89.29 CHRONIC PAIN OF RIGHT KNEE: ICD-10-CM

## 2022-03-18 RX ORDER — TIZANIDINE 4 MG/1
TABLET ORAL
Qty: 60 TABLET | Refills: 3 | Status: SHIPPED | OUTPATIENT
Start: 2022-03-18 | End: 2022-06-29

## 2022-03-18 NOTE — TELEPHONE ENCOUNTER
Medication:   Requested Prescriptions     Pending Prescriptions Disp Refills    tiZANidine (ZANAFLEX) 4 MG tablet [Pharmacy Med Name: tiZANidine HCL 4MG TABLET] 60 tablet 0     Sig: TAKE ONE TABLET BY MOUTH TWICE A DAY AS NEEDED FOR MUSCLE PAIN        Last Filled:      Patient Phone Number: [phone] (home) [phone] (work)    Last appt: 11/29/2021   Next appt: Visit date not found    Last OARRS: No flowsheet data found.

## 2022-04-06 ENCOUNTER — TELEPHONE (OUTPATIENT)
Dept: PRIMARY CARE CLINIC | Age: 37
End: 2022-04-06

## 2022-04-06 NOTE — TELEPHONE ENCOUNTER
I have attempted to contact this patient by telephone, but the ph # is no good. [phone]. Needs appointment for Dm check up! Please schedule.

## 2022-04-11 ENCOUNTER — TELEPHONE (OUTPATIENT)
Dept: PRIMARY CARE CLINIC | Age: 37
End: 2022-04-11

## 2022-04-11 NOTE — TELEPHONE ENCOUNTER
----- Message from ALIYA Haskins CNP sent at 4/8/2022  2:35 PM EDT -----  Regarding: OV  Schedule office visit within 4 weeks

## 2022-04-15 LAB — PAP SMEAR, EXTERNAL: NORMAL

## 2022-04-19 LAB
GONADOTROPIN, CHORIONIC (HCG) QUANT: 9351 MIU/ML
PROGESTERONE LEVEL: 13.7 NG/ML

## 2022-05-10 DIAGNOSIS — M25.561 CHRONIC PAIN OF RIGHT KNEE: ICD-10-CM

## 2022-05-10 DIAGNOSIS — E11.42 DIABETIC POLYNEUROPATHY ASSOCIATED WITH TYPE 2 DIABETES MELLITUS (HCC): ICD-10-CM

## 2022-05-10 DIAGNOSIS — G89.29 CHRONIC PAIN OF RIGHT KNEE: ICD-10-CM

## 2022-05-16 RX ORDER — GABAPENTIN 600 MG/1
TABLET ORAL
Qty: 90 TABLET | Refills: 3 | OUTPATIENT
Start: 2022-05-16 | End: 2022-06-15

## 2022-06-29 ENCOUNTER — OFFICE VISIT (OUTPATIENT)
Dept: PRIMARY CARE CLINIC | Age: 37
End: 2022-06-29
Payer: MEDICARE

## 2022-06-29 VITALS
TEMPERATURE: 97.3 F | HEART RATE: 103 BPM | WEIGHT: 290.6 LBS | RESPIRATION RATE: 18 BRPM | SYSTOLIC BLOOD PRESSURE: 145 MMHG | DIASTOLIC BLOOD PRESSURE: 96 MMHG | BODY MASS INDEX: 45.51 KG/M2

## 2022-06-29 DIAGNOSIS — O03.9 MISCARRIAGE: ICD-10-CM

## 2022-06-29 DIAGNOSIS — Z79.4 TYPE 2 DIABETES MELLITUS WITHOUT COMPLICATION, WITH LONG-TERM CURRENT USE OF INSULIN (HCC): ICD-10-CM

## 2022-06-29 DIAGNOSIS — E11.42 DIABETIC POLYNEUROPATHY ASSOCIATED WITH TYPE 2 DIABETES MELLITUS (HCC): ICD-10-CM

## 2022-06-29 DIAGNOSIS — E11.9 TYPE 2 DIABETES MELLITUS WITHOUT COMPLICATION, WITH LONG-TERM CURRENT USE OF INSULIN (HCC): ICD-10-CM

## 2022-06-29 DIAGNOSIS — I10 ESSENTIAL HYPERTENSION: ICD-10-CM

## 2022-06-29 LAB — HBA1C MFR BLD: 9.6 %

## 2022-06-29 PROCEDURE — 3046F HEMOGLOBIN A1C LEVEL >9.0%: CPT | Performed by: FAMILY MEDICINE

## 2022-06-29 PROCEDURE — 83036 HEMOGLOBIN GLYCOSYLATED A1C: CPT | Performed by: FAMILY MEDICINE

## 2022-06-29 PROCEDURE — 99214 OFFICE O/P EST MOD 30 MIN: CPT | Performed by: FAMILY MEDICINE

## 2022-06-29 PROCEDURE — G8417 CALC BMI ABV UP PARAM F/U: HCPCS | Performed by: FAMILY MEDICINE

## 2022-06-29 PROCEDURE — 2022F DILAT RTA XM EVC RTNOPTHY: CPT | Performed by: FAMILY MEDICINE

## 2022-06-29 PROCEDURE — G8427 DOCREV CUR MEDS BY ELIG CLIN: HCPCS | Performed by: FAMILY MEDICINE

## 2022-06-29 PROCEDURE — 1036F TOBACCO NON-USER: CPT | Performed by: FAMILY MEDICINE

## 2022-06-29 RX ORDER — AMITRIPTYLINE HYDROCHLORIDE 25 MG/1
TABLET, FILM COATED ORAL
Qty: 30 TABLET | Refills: 3 | Status: SHIPPED | OUTPATIENT
Start: 2022-06-29 | End: 2022-07-14 | Stop reason: ALTCHOICE

## 2022-06-29 RX ORDER — METOPROLOL SUCCINATE 25 MG/1
25 TABLET, EXTENDED RELEASE ORAL DAILY
Qty: 90 TABLET | Refills: 1 | Status: SHIPPED | OUTPATIENT
Start: 2022-06-29 | End: 2022-06-29 | Stop reason: SDUPTHER

## 2022-06-29 RX ORDER — VALSARTAN 80 MG/1
80 TABLET ORAL DAILY
Qty: 90 TABLET | Refills: 1 | Status: SHIPPED | OUTPATIENT
Start: 2022-06-29

## 2022-06-29 RX ORDER — INSULIN HUMAN 100 [IU]/ML
INJECTION, SUSPENSION SUBCUTANEOUS
COMMUNITY
End: 2022-06-29 | Stop reason: SDUPTHER

## 2022-06-29 RX ORDER — METOPROLOL SUCCINATE 50 MG/1
50 TABLET, EXTENDED RELEASE ORAL DAILY
Qty: 90 TABLET | Refills: 1 | Status: SHIPPED
Start: 2022-06-29 | End: 2022-07-14 | Stop reason: DRUGHIGH

## 2022-06-29 RX ORDER — DULOXETINE 40 MG/1
40 CAPSULE, DELAYED RELEASE ORAL DAILY
Qty: 90 CAPSULE | Refills: 1 | Status: SHIPPED | OUTPATIENT
Start: 2022-06-29 | End: 2022-07-14 | Stop reason: SDUPTHER

## 2022-06-29 RX ORDER — INSULIN HUMAN 100 [IU]/ML
INJECTION, SUSPENSION SUBCUTANEOUS
Qty: 5 PEN | Refills: 3 | Status: SHIPPED | OUTPATIENT
Start: 2022-06-29 | End: 2022-07-14 | Stop reason: SDUPTHER

## 2022-06-29 RX ORDER — GABAPENTIN 600 MG/1
600 TABLET ORAL 3 TIMES DAILY
Qty: 90 TABLET | Refills: 3 | Status: SHIPPED | OUTPATIENT
Start: 2022-06-29 | End: 2022-07-29

## 2022-06-29 RX ORDER — NIFEDIPINE 30 MG/1
30 TABLET, FILM COATED, EXTENDED RELEASE ORAL 2 TIMES DAILY
COMMUNITY
End: 2022-06-29

## 2022-06-29 RX ORDER — INSULIN ASPART 100 [IU]/ML
24 INJECTION, SOLUTION INTRAVENOUS; SUBCUTANEOUS
COMMUNITY
Start: 2022-05-17 | End: 2022-06-29 | Stop reason: SDUPTHER

## 2022-06-29 RX ORDER — INSULIN ASPART 100 [IU]/ML
24 INJECTION, SOLUTION INTRAVENOUS; SUBCUTANEOUS
Qty: 5 PEN | Refills: 3 | Status: SHIPPED | OUTPATIENT
Start: 2022-06-29 | End: 2022-06-29 | Stop reason: SDUPTHER

## 2022-06-29 RX ORDER — INSULIN ASPART 100 [IU]/ML
INJECTION, SOLUTION INTRAVENOUS; SUBCUTANEOUS
Qty: 5 PEN | Refills: 3 | Status: SHIPPED | OUTPATIENT
Start: 2022-06-29

## 2022-06-29 ASSESSMENT — ENCOUNTER SYMPTOMS
ABDOMINAL PAIN: 0
BLOOD IN STOOL: 0
SHORTNESS OF BREATH: 0
DIARRHEA: 0
CONSTIPATION: 0

## 2022-06-29 NOTE — PROGRESS NOTES
2022     Louise Mcadams (:  1985) is a 40 y.o. female, here for evaluation of the following medical concerns:    HPI  Patient is 40years old male with the poorly controlled diabetes, hypertension, morbid obesity, history of asthma presented to the office for follow-up she reported that she has a recent miscarriage the third time and decided not to have anymore pregnancy. He would like to discuss current treatment including Diovan which was stopped during pregnancy and her diabetes medication. He will she was on Diovan 80 mg daily and Toprol-XL 25 mg daily as well as nifedipine. Her blood pressure today is somewhat elevated at 145/96 with a pulse of 103. She has diabetes and HbA1c was elevated at 12% 6 months ago today it came down to 9.6% somewhat better but still elevated patient takes Humulin N insulin 35 units in the morning and 35 units at suppertime. She also take NovoLog insulin 24 units at breakfast and 24 units at dinner but reported that she has low blood sugar especially in the morning. She was prescribed Laquetta Cords but not taking it anymore She has  diabetic neuropathy would like refill of amitriptyline and gabapentin. Was also on duloxetine 20 mg daily for anxiety but was wondering could be increased. She denies headache nausea vomiting, denies fever and chills, denies chest pain or shortness of breath, denies bowel or urinary disturbance. Review of Systems   Constitutional: Negative for activity change and appetite change. Eyes: Negative for visual disturbance. Respiratory: Negative for shortness of breath. Cardiovascular: Negative for chest pain and leg swelling. Gastrointestinal: Negative for abdominal pain, blood in stool, constipation and diarrhea. Genitourinary: Negative for difficulty urinating, frequency, hematuria, menstrual problem and urgency. Neurological: Negative for dizziness and syncope. Psychiatric/Behavioral: Negative for behavioral problems. Prior to Visit Medications    Medication Sig Taking? Authorizing Provider   insulin NPH (HUMULIN N KWIKPEN) 100 UNIT/ML injection pen 40 In AM;  40 At bedtime Yes Malik Caceres MD   insulin aspart (NOVOLOG FLEXPEN) 100 UNIT/ML injection pen Inject 24 Units into the skin Daily with supper 20 units at breakfast; 20 at dinner. Yes Malik Caceres MD   amitriptyline (ELAVIL) 25 MG tablet TAKE ONE TABLET BY MOUTH EVERY NIGHT AT BEDTIME Yes Malik Caceres MD   DULoxetine 40 MG CPEP Take 40 mg by mouth daily Yes Malik Caceres MD   valsartan (DIOVAN) 80 MG tablet Take 1 tablet by mouth daily Yes Malik Caceres MD   gabapentin (NEURONTIN) 600 MG tablet Take 1 tablet by mouth 3 times daily for 30 days. Yes Malik Caceres MD   metoprolol succinate (TOPROL XL) 50 MG extended release tablet Take 1 tablet by mouth daily Yes Malik Caceres MD   ibuprofen (ADVIL;MOTRIN) 800 MG tablet Take 1 tablet by mouth every 8 hours as needed for Pain  Randolph, PA   dapagliflozin (FARXIGA) 5 MG tablet Take 1 tablet by mouth every morning [id number] 04/2024  ALIYA Gooden CNP   acetaminophen (APAP EXTRA STRENGTH) 500 MG tablet One tab 4 times  Patient taking differently: Take 500 mg by mouth every 6 hours as needed for Pain or Fever   ALIYA Gooden CNP   amLODIPine (NORVASC) 10 MG tablet Take 1 tablet by mouth daily for 7 days  John Butt MD        Social History     Tobacco Use    Smoking status: Never Smoker    Smokeless tobacco: Never Used   Substance Use Topics    Alcohol use: No        Vitals:    06/29/22 1633   BP: (!) 145/96   Pulse: (!) 103   Resp: 18   Temp: 97.3 °F (36.3 °C)   TempSrc: Temporal   Weight: 290 lb 9.6 oz (131.8 kg)     Estimated body mass index is 45.51 kg/m² as calculated from the following:    Height as of 3/13/22: 5' 7\" (1.702 m). Weight as of this encounter: 290 lb 9.6 oz (131.8 kg). Physical Exam  Constitutional:       Appearance: She is well-developed. HENT:      Head: Normocephalic. Right Ear: External ear normal.      Nose: Nose normal.   Eyes:      Conjunctiva/sclera: Conjunctivae normal.      Pupils: Pupils are equal, round, and reactive to light. Neck:      Thyroid: No thyromegaly. Cardiovascular:      Rate and Rhythm: Normal rate and regular rhythm. Heart sounds: Normal heart sounds. No murmur heard. No friction rub. Pulmonary:      Effort: Pulmonary effort is normal.      Breath sounds: Normal breath sounds. Abdominal:      General: Bowel sounds are normal.      Palpations: Abdomen is soft. There is no mass. Musculoskeletal:         General: Normal range of motion. Cervical back: Normal range of motion and neck supple. Lymphadenopathy:      Cervical: No cervical adenopathy. Skin:     General: Skin is warm. Findings: No rash. Neurological:      Mental Status: She is alert and oriented to person, place, and time. ASSESSMENT/PLAN:  1. Miscarriage  With her gynecologist.    2. Type 2 diabetes mellitus without complication, with long-term current use of insulin ([address]  Still poorly controlled. Will increase Humulin N to 40 units twice a day continue NovoLog insulin 20 units breakfast and supper  - POCT glycosylated hemoglobin (Hb A1C)  - insulin NPH (HUMULIN N KWIKPEN) 100 UNIT/ML injection pen; 40 In AM;  40 At bedtime  Dispense: 5 pen; Refill: 3  - insulin aspart (NOVOLOG FLEXPEN) 100 UNIT/ML injection pen; I 20 units at breakfast; 20 at dinner. Dispense: 5 pen; Refill: 3  - DULoxetine 40 MG CPEP; Take 40 mg by mouth daily  Dispense: 90 capsule; Refill: 1    3. Diabetic polyneuropathy associated with type 2 diabetes mellitus (HCC)  Continue gabapentin 600 mg 3 times a day  - gabapentin (NEURONTIN) 600 MG tablet; Take 1 tablet by mouth 3 times daily for 30 days. Dispense: 90 tablet; Refill: 3    4.  Essential hypertension  Since she is done with pregnancy restart Diovan 80 mg daily as Toprol-XL at 50 mg daily  - valsartan (DIOVAN) 80 MG tablet; Take 1 tablet by mouth daily  Dispense: 90 tablet; Refill: 1  - metoprolol succinate (TOPROL XL) 50 MG extended release tablet; Take 1 tablet by mouth daily  Dispense: 90 tablet;  Refill: 1      RTC in 2 mos    An electronic signature was used to authenticate this note.    --Cecelia Reece MD on 6/29/2022 at 7:03 PM

## 2022-07-05 ENCOUNTER — NURSE TRIAGE (OUTPATIENT)
Dept: OTHER | Facility: CLINIC | Age: 37
End: 2022-07-05

## 2022-07-05 NOTE — TELEPHONE ENCOUNTER
Received call from Damian 22 at [facility] with Red Flag Complaint. Subjective: Caller states \"My throat hurts and I'm having a hard time swallowing. \"     Current Symptoms: Sore throat, cough, runny nose     Onset: x3 days     Associated Symptoms: NA     Pain Severity: 5/10; constant, sore    Temperature: Has not taken a temperature- denies chills and feeling feverish    What has been tried: Ibuprofen     LMP: Was not asked  Pregnant: Unknown    Recommended disposition: See PCP within 24 Hours    Care advice provided, patient verbalizes understanding; denies any other questions or concerns; instructed to call back for any new or worsening symptoms. Patient/Caller agrees with recommended disposition; writer provided warm transfer to Chelsea Lopez at [facility] for appointment scheduling. Attention Provider: Thank you for allowing me to participate in the care of your patient. The patient was connected to triage in response to information provided to the ECC/PSC. Please do not respond through this encounter as the response is not directed to a shared pool.       Reason for Disposition   Diabetes mellitus or weak immune system (e.g., HIV positive, cancer chemo, splenectomy, organ transplant, chronic steroids)    Protocols used: SORE THROAT-ADULT-

## 2022-07-12 RX ORDER — TIZANIDINE 4 MG/1
TABLET ORAL
Qty: 60 TABLET | Refills: 0 | Status: SHIPPED | OUTPATIENT
Start: 2022-07-12 | End: 2022-08-15 | Stop reason: SDUPTHER

## 2022-07-12 NOTE — TELEPHONE ENCOUNTER
Medication:   Requested Prescriptions     Pending Prescriptions Disp Refills    tiZANidine (ZANAFLEX) 4 MG tablet [Pharmacy Med Name: tiZANidine HCL 4MG TABLET] 60 tablet      Sig: TAKE ONE TABLET BY MOUTH TWICE A DAY AS NEEDED FOR MUSCLE SPASMS        Last Filled:      Patient Phone Number: [phone] (home) [phone] (work)    Last appt: 6/29/2022   Next appt: 7/14/2022    Last OARRS: No flowsheet data found.

## 2022-07-13 ASSESSMENT — ENCOUNTER SYMPTOMS
BLOOD IN STOOL: 0
CONSTIPATION: 0
SHORTNESS OF BREATH: 0
DIARRHEA: 0
ABDOMINAL PAIN: 0

## 2022-07-14 ENCOUNTER — OFFICE VISIT (OUTPATIENT)
Dept: PRIMARY CARE CLINIC | Age: 37
End: 2022-07-14
Payer: MEDICARE

## 2022-07-14 ENCOUNTER — TELEPHONE (OUTPATIENT)
Dept: ADMINISTRATIVE | Age: 37
End: 2022-07-14

## 2022-07-14 VITALS
SYSTOLIC BLOOD PRESSURE: 132 MMHG | DIASTOLIC BLOOD PRESSURE: 89 MMHG | OXYGEN SATURATION: 98 % | TEMPERATURE: 96.8 F | WEIGHT: 292.6 LBS | BODY MASS INDEX: 45.92 KG/M2 | HEART RATE: 81 BPM | HEIGHT: 67 IN

## 2022-07-14 DIAGNOSIS — O03.9 MISCARRIAGE: ICD-10-CM

## 2022-07-14 DIAGNOSIS — E11.69 OBESITY, DIABETES, AND HYPERTENSION SYNDROME (HCC): Primary | ICD-10-CM

## 2022-07-14 DIAGNOSIS — T83.31XA: ICD-10-CM

## 2022-07-14 DIAGNOSIS — Z13.29 SCREENING FOR THYROID DISORDER: ICD-10-CM

## 2022-07-14 DIAGNOSIS — G62.9 NEUROPATHY: ICD-10-CM

## 2022-07-14 DIAGNOSIS — E11.59 OBESITY, DIABETES, AND HYPERTENSION SYNDROME (HCC): Primary | ICD-10-CM

## 2022-07-14 DIAGNOSIS — Z13.31 POSITIVE DEPRESSION SCREENING: ICD-10-CM

## 2022-07-14 DIAGNOSIS — Z33.1: ICD-10-CM

## 2022-07-14 DIAGNOSIS — E66.9 OBESITY, DIABETES, AND HYPERTENSION SYNDROME (HCC): Primary | ICD-10-CM

## 2022-07-14 DIAGNOSIS — I15.2 OBESITY, DIABETES, AND HYPERTENSION SYNDROME (HCC): Primary | ICD-10-CM

## 2022-07-14 DIAGNOSIS — I10 ESSENTIAL HYPERTENSION: ICD-10-CM

## 2022-07-14 DIAGNOSIS — E11.65 UNCONTROLLED TYPE 2 DIABETES MELLITUS WITH HYPERGLYCEMIA (HCC): ICD-10-CM

## 2022-07-14 LAB
A/G RATIO: 1.5 (ref 1.1–2.2)
ALBUMIN SERPL-MCNC: 4 G/DL (ref 3.4–5)
ALP BLD-CCNC: 90 U/L (ref 40–129)
ALT SERPL-CCNC: 10 U/L (ref 10–40)
ANION GAP SERPL CALCULATED.3IONS-SCNC: 12 MMOL/L (ref 3–16)
AST SERPL-CCNC: 10 U/L (ref 15–37)
BILIRUB SERPL-MCNC: <0.2 MG/DL (ref 0–1)
BUN BLDV-MCNC: 13 MG/DL (ref 7–20)
C-REACTIVE PROTEIN: 15.5 MG/L (ref 0–5.1)
CALCIUM SERPL-MCNC: 9.3 MG/DL (ref 8.3–10.6)
CHLORIDE BLD-SCNC: 100 MMOL/L (ref 99–110)
CO2: 26 MMOL/L (ref 21–32)
CREAT SERPL-MCNC: 0.7 MG/DL (ref 0.6–1.1)
GFR AFRICAN AMERICAN: >60
GFR NON-AFRICAN AMERICAN: >60
GLUCOSE BLD-MCNC: 143 MG/DL (ref 70–99)
POTASSIUM SERPL-SCNC: 4.4 MMOL/L (ref 3.5–5.1)
SEDIMENTATION RATE, ERYTHROCYTE: 49 MM/HR (ref 0–20)
SODIUM BLD-SCNC: 138 MMOL/L (ref 136–145)
T4 FREE: 1 NG/DL (ref 0.9–1.8)
TOTAL PROTEIN: 6.6 G/DL (ref 6.4–8.2)
TSH REFLEX: 2.65 UIU/ML (ref 0.27–4.2)

## 2022-07-14 PROCEDURE — G8417 CALC BMI ABV UP PARAM F/U: HCPCS | Performed by: NURSE PRACTITIONER

## 2022-07-14 PROCEDURE — 1036F TOBACCO NON-USER: CPT | Performed by: NURSE PRACTITIONER

## 2022-07-14 PROCEDURE — 99214 OFFICE O/P EST MOD 30 MIN: CPT | Performed by: NURSE PRACTITIONER

## 2022-07-14 PROCEDURE — G8427 DOCREV CUR MEDS BY ELIG CLIN: HCPCS | Performed by: NURSE PRACTITIONER

## 2022-07-14 PROCEDURE — 3046F HEMOGLOBIN A1C LEVEL >9.0%: CPT | Performed by: NURSE PRACTITIONER

## 2022-07-14 PROCEDURE — 2022F DILAT RTA XM EVC RTNOPTHY: CPT | Performed by: NURSE PRACTITIONER

## 2022-07-14 RX ORDER — METOPROLOL SUCCINATE 25 MG/1
25 TABLET, EXTENDED RELEASE ORAL DAILY
Qty: 30 TABLET | Refills: 3 | Status: SHIPPED | OUTPATIENT
Start: 2022-07-14 | End: 2022-08-30 | Stop reason: SDUPTHER

## 2022-07-14 RX ORDER — DULOXETINE 40 MG/1
40 CAPSULE, DELAYED RELEASE ORAL DAILY
Qty: 90 CAPSULE | Refills: 1 | Status: SHIPPED | OUTPATIENT
Start: 2022-07-14

## 2022-07-14 RX ORDER — AMLODIPINE BESYLATE 5 MG/1
5 TABLET ORAL DAILY
Qty: 90 TABLET | Refills: 0 | Status: SHIPPED | OUTPATIENT
Start: 2022-07-14 | End: 2022-10-12

## 2022-07-14 RX ORDER — INSULIN HUMAN 100 [IU]/ML
INJECTION, SUSPENSION SUBCUTANEOUS
Qty: 5 PEN | Refills: 3 | Status: SHIPPED | OUTPATIENT
Start: 2022-07-14 | End: 2022-09-04 | Stop reason: ALTCHOICE

## 2022-07-14 SDOH — ECONOMIC STABILITY: FOOD INSECURITY: WITHIN THE PAST 12 MONTHS, YOU WORRIED THAT YOUR FOOD WOULD RUN OUT BEFORE YOU GOT MONEY TO BUY MORE.: OFTEN TRUE

## 2022-07-14 SDOH — ECONOMIC STABILITY: FOOD INSECURITY: WITHIN THE PAST 12 MONTHS, THE FOOD YOU BOUGHT JUST DIDN'T LAST AND YOU DIDN'T HAVE MONEY TO GET MORE.: NEVER TRUE

## 2022-07-14 ASSESSMENT — PATIENT HEALTH QUESTIONNAIRE - PHQ9
SUM OF ALL RESPONSES TO PHQ QUESTIONS 1-9: 17
8. MOVING OR SPEAKING SO SLOWLY THAT OTHER PEOPLE COULD HAVE NOTICED. OR THE OPPOSITE, BEING SO FIGETY OR RESTLESS THAT YOU HAVE BEEN MOVING AROUND A LOT MORE THAN USUAL: 0
5. POOR APPETITE OR OVEREATING: 1
SUM OF ALL RESPONSES TO PHQ QUESTIONS 1-9: 17
9. THOUGHTS THAT YOU WOULD BE BETTER OFF DEAD, OR OF HURTING YOURSELF: 0
4. FEELING TIRED OR HAVING LITTLE ENERGY: 2
1. LITTLE INTEREST OR PLEASURE IN DOING THINGS: 3
3. TROUBLE FALLING OR STAYING ASLEEP: 3
SUM OF ALL RESPONSES TO PHQ9 QUESTIONS 1 & 2: 5
7. TROUBLE CONCENTRATING ON THINGS, SUCH AS READING THE NEWSPAPER OR WATCHING TELEVISION: 3
SUM OF ALL RESPONSES TO PHQ QUESTIONS 1-9: 17
SUM OF ALL RESPONSES TO PHQ QUESTIONS 1-9: 17
10. IF YOU CHECKED OFF ANY PROBLEMS, HOW DIFFICULT HAVE THESE PROBLEMS MADE IT FOR YOU TO DO YOUR WORK, TAKE CARE OF THINGS AT HOME, OR GET ALONG WITH OTHER PEOPLE: 2
2. FEELING DOWN, DEPRESSED OR HOPELESS: 2
6. FEELING BAD ABOUT YOURSELF - OR THAT YOU ARE A FAILURE OR HAVE LET YOURSELF OR YOUR FAMILY DOWN: 3

## 2022-07-14 ASSESSMENT — SOCIAL DETERMINANTS OF HEALTH (SDOH): HOW HARD IS IT FOR YOU TO PAY FOR THE VERY BASICS LIKE FOOD, HOUSING, MEDICAL CARE, AND HEATING?: VERY HARD

## 2022-07-14 NOTE — PROGRESS NOTES
Twila Singh (:  1985) is a 40 y.o. female,Established patient, here for evaluation of the following chief complaint(s):  Medication Check (medication adjustment ), Miscarriage (2022), and Other (neuropathy )         ASSESSMENT/PLAN:  1. Obesity, diabetes, and hypertension  - Decrease NPH 30 units at bedtime  -Continue NPH 40 units QAM  Continue Novolog BID with meals  -Schedule appointment with Endocrinology  -Hold Novolog for FSBS less than 100  -consider GLP-1 for weight and diabetes control  -   [facility]  2. Type 2 diabetes mellitus without complication, with long-term current use of insulin (HCC)  -    Continue DULoxetine HCl 40 MG CPEP; Take 40 mg by mouth daily, Disp-90 capsule, R-1Normal  - Decrease    insulin NPH (HUMULIN N KWIKPEN) 100 UNIT/ML injection pen; 40 In AM;  30 At bedtime, Disp-5 pen, R-3Print  - Schedule appointment    Naun Oneal MD, Endocrinology, [facility]  -Consider GLP-1 for diabetes and weight control  3. Neuropathy  -     C-Reactive Protein; Future  -     Sedimentation Rate; Future  -continue Cymbalta  4. Miscarriage  -schedule appointment   Cathy West MD, Gynecology, [facility]  -Use condom with sexual intercourse  5. Intrauterine device contraceptive failure resulting in pregnancy- reports having [facility] at [facility]  -  Tony Velarde MD, Gynecology, [facility]  6. Screening for thyroid disorder  -     TSH with Reflex; Future  -     T4, Free; Future  7. Positive depression screening-denies SI/HI; PHQ-9 score 17  -given resource packet for Psychiatry and therapy  -will discuss medication at next appointment      Return in about 3 weeks (around 2022). Subjective    Had IUD placed at [facility], had positive pregnancy test at [facility] while being seen for back pain in May. Had miscarriage .  Denies abdominal pain or bleeding today.   SUBJECTIVE/OBJECTIVE:  HPI  Hypertension  This is a chronic problem. The current episode started more than 1 year ago. The problem is uncontrolled. Pertinent negatives include no chest pain, headaches, palpitations or shortness of breath. Risk factors for coronary artery disease include diabetes mellitus, stress and obesity. Past treatments include beta blockers, angiotensin blockers, calcium channel blockers and lifestyle changes. The current treatment provides mild improvement. Compliance problems include diet and exercise. Taking Toprol XL 25 mg daily, was originally prescribed 50 but most recent prescription obtained was 25 mg. Was taking Nifedipine during pregnancy but has discontinued. Diabetes  Had gestational diabetes with 2nd pregnancy. a1c improved with Metformin. Diagnosed with diabetes 7 years ago. Was prescribed insulin for 3-4 years prior, was not taking as prescribed. Miscarriage every year for last 3 years. At time of pregnancy She presents for her follow-up diabetic visit. Diabetic complications include peripheral neuropathy. Risk factors for coronary artery disease include diabetes mellitus, hypertension and obesity. Current diabetic treatment includes insulin injections and oral agent (triple therapy). She is compliant with treatment most of the time. Her weight is stable. Meal planning includes avoidance of concentrated sweets. An ACE inhibitor/angiotensin II receptor blocker is being taken. Has not scheduled appointment with Endocrinology, will place referral again today. Reports FSBS 80's- low 100's, lowest 60. Insulin regimen started during pregnancy. Taking NPH 40 units breakfast and bedtime; Novolog 20 units with breakfast and dinner. Unable to obtain Farxiga, needed PA.  BS ; states she is taking blood sugar 1 hour after lunch. Has stopped drinking Soda. Met with dietitian during pregnancy. Did not bring log to appointment. Has neuropathy of hands and feet. CO2 26 07/14/2022    BUN 13 07/14/2022    CREATININE 0.7 07/14/2022    GLUCOSE 143 (H) 07/14/2022    CALCIUM 9.3 07/14/2022    PROT 6.6 07/14/2022    LABALBU 4.0 07/14/2022    BILITOT <0.2 07/14/2022    ALKPHOS 90 07/14/2022    AST 10 (L) 07/14/2022    ALT 10 07/14/2022    LABGLOM >60 07/14/2022    GFRAA >60 07/14/2022    AGRATIO 1.5 07/14/2022    GLOB 3.2 06/14/2020     I have discontinued Cassie Brink's dapagliflozin and amitriptyline. I have also changed her amLODIPine, DULoxetine HCl, and HumuLIN N KwikPen. Additionally, I am having her start on metoprolol succinate. Lastly, I am having her maintain her valsartan, gabapentin, NovoLOG FlexPen, and tiZANidine. Physical Exam  Constitutional:       Appearance: She is well-developed. HENT:      Head: Normocephalic. Right Ear: External ear normal.      Nose: Nose normal.   Eyes:      Conjunctiva/sclera: Conjunctivae normal.      Pupils: Pupils are equal, round, and reactive to light. Neck:      Thyroid: No thyromegaly. Cardiovascular:      Rate and Rhythm: Normal rate and regular rhythm. Heart sounds: Normal heart sounds. No murmur heard. No friction rub. Pulmonary:      Effort: Pulmonary effort is normal.      Breath sounds: Normal breath sounds. Abdominal:      General: Bowel sounds are normal.      Palpations: Abdomen is soft. There is no mass. Musculoskeletal:         General: Normal range of motion. Cervical back: Normal range of motion and neck supple. Lymphadenopathy:      Cervical: No cervical adenopathy. Skin:     General: Skin is warm. Findings: No rash. Neurological:      Mental Status: She is alert and oriented to person, place, and time. On this date 7/14/2022 I have spent 30 minutes reviewing previous notes, test results and face to face with the patient discussing the diagnosis and importance of compliance with the treatment plan as well as documenting on the day of the visit.       An electronic signature was used to authenticate this note. --AILYA Hernandez - CNP   PHQ-9 score today: (PHQ-9 Total Score: 17), additional evaluation and assessment performed, follow-up plan includes but not limited to: Medication management and Referral to /Specialist  for evaluation and management.

## 2022-07-14 NOTE — TELEPHONE ENCOUNTER
Submitted PA for DULoxetine HCl 40MG dr capsules [facility] DR[facility]  Key: [id number] - Rx #: [id number] STATUS: APPROVED effective 07/15/2022 thru 08/15/2022    Please notify patient.  Thank you

## 2022-07-15 DIAGNOSIS — M25.579 ARTHRALGIA OF ANKLE, UNSPECIFIED LATERALITY: ICD-10-CM

## 2022-07-15 DIAGNOSIS — M25.579 ARTHRALGIA OF ANKLE, UNSPECIFIED LATERALITY: Primary | ICD-10-CM

## 2022-07-15 LAB
ANTI-NUCLEAR ANTIBODY (ANA): NEGATIVE
BASOPHILS ABSOLUTE: 0 K/UL (ref 0–0.2)
BASOPHILS RELATIVE PERCENT: 0.4 %
EOSINOPHILS ABSOLUTE: 0.2 K/UL (ref 0–0.6)
EOSINOPHILS RELATIVE PERCENT: 3.3 %
HCT VFR BLD CALC: 37 % (ref 36–48)
HEMOGLOBIN: 12.2 G/DL (ref 12–16)
LYMPHOCYTES ABSOLUTE: 3 K/UL (ref 1–5.1)
LYMPHOCYTES RELATIVE PERCENT: 49.5 %
MCH RBC QN AUTO: 28.4 PG (ref 26–34)
MCHC RBC AUTO-ENTMCNC: 32.8 G/DL (ref 31–36)
MCV RBC AUTO: 86.5 FL (ref 80–100)
MONOCYTES ABSOLUTE: 0.3 K/UL (ref 0–1.3)
MONOCYTES RELATIVE PERCENT: 5.5 %
NEUTROPHILS ABSOLUTE: 2.5 K/UL (ref 1.7–7.7)
NEUTROPHILS RELATIVE PERCENT: 41.3 %
PDW BLD-RTO: 14.9 % (ref 12.4–15.4)
PLATELET # BLD: 327 K/UL (ref 135–450)
PMV BLD AUTO: 8.3 FL (ref 5–10.5)
RBC # BLD: 4.28 M/UL (ref 4–5.2)
RHEUMATOID FACTOR: <10 IU/ML
URIC ACID, SERUM: 3.6 MG/DL (ref 2.6–6)
WBC # BLD: 6.1 K/UL (ref 4–11)

## 2022-08-10 RX ORDER — TIZANIDINE 4 MG/1
TABLET ORAL
Qty: 60 TABLET | Refills: 0 | OUTPATIENT
Start: 2022-08-10

## 2022-08-10 NOTE — TELEPHONE ENCOUNTER
Medication:   Requested Prescriptions     Pending Prescriptions Disp Refills    tiZANidine (ZANAFLEX) 4 MG tablet [Pharmacy Med Name: tiZANidine HCL 4MG TABLET] 60 tablet 0     Sig: TAKE ONE TABLET BY MOUTH TWICE A DAY AS NEEDED FOR MUSCLE SPASMS        Last Filled:      Patient Phone Number: [phone] (home) [phone] (work)    Last appt: 7/14/2022   Next appt: 8/30/2022    Last OARRS: No flowsheet data found.

## 2022-08-12 RX ORDER — TIZANIDINE 4 MG/1
TABLET ORAL
Qty: 60 TABLET | Refills: 0 | OUTPATIENT
Start: 2022-08-12

## 2022-08-15 RX ORDER — TIZANIDINE 4 MG/1
TABLET ORAL
Qty: 60 TABLET | Refills: 1 | Status: SHIPPED | OUTPATIENT
Start: 2022-08-15 | End: 2022-08-30 | Stop reason: SDUPTHER

## 2022-08-22 NOTE — PROGRESS NOTES
Brittny Huber (:  1985) is a 40 y.o. female,Established patient, here for evaluation of the following chief complaint(s): Other (Neuropathy hands and feet burning hard to go to sleep )         ASSESSMENT/PLAN:  1. Numbness and tingling of both feet  -     EMG; Future  -     XR LUMBAR SPINE (2-3 VIEWS); Future  2. Obesity, diabetes, and hypertension syndrome (HCC)  -  Start   metoprolol succinate (TOPROL XL) 50 MG extended release tablet; Take 1 tablet by mouth daily, Disp-90 tablet, R-0Print  - Schedule    Jenna Perry MD, Endocrinology, [facility]  -     Continuous Blood Gluc  (DEXCOM G5  KIT) [address];  Act by Does not apply route continuous, Disp-1 each, R-0Normal  -Continue NPH to 30 units at [facility] and evening  -Do not take insulin at breakfast, you are not eating enough  -take Novolog (clear) insulin if you blood sugar is greater than 100 at lunch or dinner  -will switch you to Lantus, long acting insulin  -Once you start Lantus you will discontinue NPH    3. Numbness and tingling of hand  -     XR CERVICAL SPINE (2-3 VIEWS); Future  4-. Positive depression screening-denies SI/HI; PHQ-9 score 19  -Start Wellbutrin daily  -Obtain Psychiatrist/therapist from patient packet provided. Return in about 3 weeks (around 2022). Ms. Tracey Calvert is tearful during appointment, repeatedly states she wants to feel better for her children. Very upset regarding the neuropathy of hands and feet. Subjective     SUBJECTIVE/OBJECTIVE:  HPI  Hypertension  This is a chronic problem. The current episode started more than 1 year ago. The problem is uncontrolled. Pertinent negatives include no chest pain, headaches, palpitations or shortness of breath. Risk factors for coronary artery disease include diabetes mellitus, stress and obesity. Past treatments include beta blockers, angiotensin blockers, calcium channel blockers and lifestyle changes.  The current treatment provides mild improvement. Compliance problems include diet and exercise. Taking Toprol XL 25 mg daily, was originally prescribed 50 but most recent prescription obtained was 25 mg. Was taking Nifedipine during pregnancy but has discontinued. Diabetes  Had gestational diabetes with 2nd pregnancy. a1c improved with Metformin. Diagnosed with diabetes 7 years ago. Was prescribed insulin for 3-4 years prior, was not taking as prescribed. Miscarriage every year for last 3 years. At time of pregnancy She presents for her follow-up diabetic visit. Diabetic complications include peripheral neuropathy. Risk factors for coronary artery disease include diabetes mellitus, hypertension and obesity. Current diabetic treatment includes insulin injections and oral agent (triple therapy). She is compliant with treatment most of the time. Her weight is stable. Meal planning includes avoidance of concentrated sweets. An ACE inhibitor/angiotensin II receptor blocker is being taken. Has not scheduled appointment with Endocrinology, will place referral again today. Reports FSBS 80's- low 100's, lowest 60. Insulin regimen started during pregnancy. Taking NPH 40 units breakfast and bedtime; Novolog 20 units with breakfast and dinner. Unable to obtain Farxiga, needed PA.  BS ; states she is taking blood sugar 1 hour after lunch. Has stopped drinking Soda. Did not bring log to appointment. Am  blood sugars in 80's, taking insulin and eating oatmeal in the morning and feels as if blood sugar decreases while at work, therefore eats, which improves symptoms. Does not check blood sugar while at work, only breakfast, dinner, and bedtime. Elevated Sed and CRP    States Tizanidine is helping with burning sensation, requesting refill. Working 12 hours/day. Gabapentin is somewhat effective. Does not want to narcotics, concerned about addiction. Has chronic knee pain. Has itching and burning sensation of feet and hands.  Covers on feet causes pain    Tachycardia  Last dose of Metoprolol taken 2 days ago, Evaluated by Dr. Son Sanabria increased to 50 mg, she obtained from the pharmacy yesterday, noticed an increase and did not take. Reports the Elavil is ineffective for insomnia. Works 7-7pm in [address], [address] [employer] [address]. Does heavy lifting, standing throughout shift. Neuropathy pain is worse after work, trying to get asleep, burning sensation. Denies rash, erythema, swelling, or injury. Started on Depo in August, continuous bleeding. Had PAP, bacterial infection, treated. States it is not effective. Depression- PHQ-9 score 19  Relates depression to peripheral neuropathy. Has history of depression. Denies homicidal/suicidal ideations. Has had several miscarriages. Review of Systems   Constitutional:  Negative for activity change, appetite change and fever. HENT:  Negative for congestion. Eyes:  Negative for visual disturbance. Respiratory:  Negative for chest tightness and shortness of breath. Cardiovascular:  Negative for chest pain, palpitations and leg swelling. Hypertension   Gastrointestinal:  Negative for abdominal pain, blood in stool, constipation and diarrhea. Multiple miscarriages   Endocrine: Negative for polyuria. Diabetes   Genitourinary:  Negative for difficulty urinating, dysuria, frequency, hematuria, menstrual problem and urgency. Musculoskeletal:  Negative for arthralgias and myalgias. Skin:  Negative for rash. Neurological:  Negative for dizziness, syncope, light-headedness and headaches. Peripheral neuropathy   Psychiatric/Behavioral:  Negative for agitation, behavioral problems, decreased concentration and sleep disturbance. The patient is not nervous/anxious. Insomnia        Objective    height is 5' 7\" (1.702 m) and weight is 294 lb (133.4 kg). Her temperature is 96.9 °F (36.1 °C).  Her blood pressure is 155/95 (abnormal) and her pulse is 120 (abnormal). 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       Past Surgical History:   Procedure Laterality Date    APPENDECTOMY  12/14/2018     Laparoscopic appendectomy     CHOLECYSTECTOMY, LAPAROSCOPIC  2/07    LAPAROSCOPIC APPENDECTOMY N/A 12/14/2018    LAPAROSCOPIC APPENDECTOMY performed by Charo Baltazar MD at [address] [facility]  07/18/2014    COLPOSCOPY OF VULVA, VAGINA AND CERVIX WITH CO-2 LASER      Family History   Problem Relation Age of Onset    High Blood Pressure Mother     High Blood Pressure Father     Asthma Father     Cancer Maternal Grandmother         breast    Breast Cancer Maternal Grandmother     Diabetes Paternal Grandmother       Social History     Tobacco Use    Smoking status: Never    Smokeless tobacco: Never   Substance Use Topics    Alcohol use: No    Drug use: Not Currently     Types: Marijuana [place])      Physical Exam  Vitals reviewed. Constitutional:       Appearance: She is well-developed. HENT:      Head: Normocephalic. Right Ear: Hearing and external ear normal.      Left Ear: Hearing and external ear normal.      Nose: Nose normal.   Eyes:      General: Lids are normal.   Cardiovascular:      Rate and Rhythm: Normal rate and regular rhythm. Heart sounds: Normal heart sounds, S1 normal and S2 normal.   Pulmonary:      Effort: Pulmonary effort is normal.      Breath sounds: Normal breath sounds. Musculoskeletal:         General: Normal range of motion. Skin:     General: Skin is warm and dry. Findings: No rash. Neurological:      Mental Status: She is alert and oriented to person, place, and time. GCS: GCS eye subscore is 4. GCS verbal subscore is 5. GCS motor subscore is 6. Psychiatric:         Attention and Perception: Attention normal.         Mood and Affect: Affect is tearful. Speech: Speech normal.         Behavior: Behavior normal. Behavior is cooperative. On this date 8/30/2022 I have spent 20 minutes reviewing previous notes, test results and face to face with the patient discussing the diagnosis and importance of compliance with the treatment plan as well as documenting on the day of the visit. An electronic signature was used to authenticate this note. --Heriberto Orozco, APRN - CNP PHQ-9 score today: (PHQ-9 Total Score: 19), additional evaluation and assessment performed, follow-up plan includes but not limited to: Medication management and Referral to /Specialist  for evaluation and management. PHQ-9 score today: (PHQ-9 Total Score: 19), additional evaluation and assessment performed, follow-up plan includes but not limited to: Medication management and Referral to BH/Specialist  for evaluation and management.

## 2022-08-27 ASSESSMENT — ENCOUNTER SYMPTOMS
SHORTNESS OF BREATH: 0
CONSTIPATION: 0
BLOOD IN STOOL: 0
DIARRHEA: 0
ABDOMINAL PAIN: 0

## 2022-08-30 ENCOUNTER — OFFICE VISIT (OUTPATIENT)
Dept: PRIMARY CARE CLINIC | Age: 37
End: 2022-08-30
Payer: MEDICARE

## 2022-08-30 VITALS
WEIGHT: 293 LBS | SYSTOLIC BLOOD PRESSURE: 155 MMHG | DIASTOLIC BLOOD PRESSURE: 95 MMHG | HEART RATE: 120 BPM | BODY MASS INDEX: 45.99 KG/M2 | HEIGHT: 67 IN | TEMPERATURE: 96.9 F

## 2022-08-30 DIAGNOSIS — I15.2 OBESITY, DIABETES, AND HYPERTENSION SYNDROME (HCC): ICD-10-CM

## 2022-08-30 DIAGNOSIS — R20.0 NUMBNESS AND TINGLING OF HAND: ICD-10-CM

## 2022-08-30 DIAGNOSIS — R20.2 NUMBNESS AND TINGLING OF BOTH FEET: Primary | ICD-10-CM

## 2022-08-30 DIAGNOSIS — E11.59 OBESITY, DIABETES, AND HYPERTENSION SYNDROME (HCC): ICD-10-CM

## 2022-08-30 DIAGNOSIS — R20.2 NUMBNESS AND TINGLING OF HAND: ICD-10-CM

## 2022-08-30 DIAGNOSIS — E66.9 OBESITY, DIABETES, AND HYPERTENSION SYNDROME (HCC): ICD-10-CM

## 2022-08-30 DIAGNOSIS — R20.0 NUMBNESS AND TINGLING OF BOTH FEET: Primary | ICD-10-CM

## 2022-08-30 DIAGNOSIS — Z13.31 POSITIVE DEPRESSION SCREENING: ICD-10-CM

## 2022-08-30 DIAGNOSIS — E11.69 OBESITY, DIABETES, AND HYPERTENSION SYNDROME (HCC): ICD-10-CM

## 2022-08-30 PROCEDURE — G8427 DOCREV CUR MEDS BY ELIG CLIN: HCPCS | Performed by: NURSE PRACTITIONER

## 2022-08-30 PROCEDURE — G8417 CALC BMI ABV UP PARAM F/U: HCPCS | Performed by: NURSE PRACTITIONER

## 2022-08-30 PROCEDURE — 3046F HEMOGLOBIN A1C LEVEL >9.0%: CPT | Performed by: NURSE PRACTITIONER

## 2022-08-30 PROCEDURE — 2022F DILAT RTA XM EVC RTNOPTHY: CPT | Performed by: NURSE PRACTITIONER

## 2022-08-30 PROCEDURE — 99214 OFFICE O/P EST MOD 30 MIN: CPT | Performed by: NURSE PRACTITIONER

## 2022-08-30 PROCEDURE — 1036F TOBACCO NON-USER: CPT | Performed by: NURSE PRACTITIONER

## 2022-08-30 RX ORDER — BUPROPION HYDROCHLORIDE 150 MG/1
150 TABLET ORAL EVERY MORNING
Qty: 30 TABLET | Refills: 3 | Status: SHIPPED | OUTPATIENT
Start: 2022-08-30

## 2022-08-30 RX ORDER — TIZANIDINE 4 MG/1
TABLET ORAL
Qty: 60 TABLET | Refills: 3 | Status: SHIPPED | OUTPATIENT
Start: 2022-08-30

## 2022-08-30 RX ORDER — BLOOD-GLUCOSE,RECEIVER,CONT
1 EACH MISCELLANEOUS CONTINUOUS
Qty: 1 EACH | Refills: 0 | Status: SHIPPED | OUTPATIENT
Start: 2022-08-30

## 2022-08-30 RX ORDER — METOPROLOL SUCCINATE 50 MG/1
50 TABLET, EXTENDED RELEASE ORAL DAILY
Qty: 90 TABLET | Refills: 0 | Status: SHIPPED | OUTPATIENT
Start: 2022-08-30

## 2022-08-30 ASSESSMENT — PATIENT HEALTH QUESTIONNAIRE - PHQ9
5. POOR APPETITE OR OVEREATING: 2
SUM OF ALL RESPONSES TO PHQ9 QUESTIONS 1 & 2: 4
7. TROUBLE CONCENTRATING ON THINGS, SUCH AS READING THE NEWSPAPER OR WATCHING TELEVISION: 3
3. TROUBLE FALLING OR STAYING ASLEEP: 3
SUM OF ALL RESPONSES TO PHQ QUESTIONS 1-9: 19
SUM OF ALL RESPONSES TO PHQ QUESTIONS 1-9: 19
9. THOUGHTS THAT YOU WOULD BE BETTER OFF DEAD, OR OF HURTING YOURSELF: 0
1. LITTLE INTEREST OR PLEASURE IN DOING THINGS: 2
SUM OF ALL RESPONSES TO PHQ QUESTIONS 1-9: 19
8. MOVING OR SPEAKING SO SLOWLY THAT OTHER PEOPLE COULD HAVE NOTICED. OR THE OPPOSITE, BEING SO FIGETY OR RESTLESS THAT YOU HAVE BEEN MOVING AROUND A LOT MORE THAN USUAL: 3
4. FEELING TIRED OR HAVING LITTLE ENERGY: 3
10. IF YOU CHECKED OFF ANY PROBLEMS, HOW DIFFICULT HAVE THESE PROBLEMS MADE IT FOR YOU TO DO YOUR WORK, TAKE CARE OF THINGS AT HOME, OR GET ALONG WITH OTHER PEOPLE: 2
2. FEELING DOWN, DEPRESSED OR HOPELESS: 2
SUM OF ALL RESPONSES TO PHQ QUESTIONS 1-9: 19
6. FEELING BAD ABOUT YOURSELF - OR THAT YOU ARE A FAILURE OR HAVE LET YOURSELF OR YOUR FAMILY DOWN: 1

## 2022-08-30 NOTE — PATIENT INSTRUCTIONS
Take NPH at lunch and evening  Dinner take Novolog (clear) 20 units if blood sugar is greater than 100  Don't take insulin at breakfast  Order Dexacom  No insulin if blood sugar is under 100  Start taking Wellbutrin daily  Start taking Metoprolol 50 mg daily for heart rate  Have imaging done  Schedule EMG

## 2022-09-01 ENCOUNTER — TELEPHONE (OUTPATIENT)
Dept: PRIMARY CARE CLINIC | Age: 37
End: 2022-09-01

## 2022-09-01 NOTE — TELEPHONE ENCOUNTER
Pt called in stating that Kaitlynn Schmitz wrote a prescription for PT to get the Dexcom continuous glucose . Pt says that the pharmacy informed her that they need a prescription stating that she also needs the transmitter and  because it's 3 components and they only received it for the . Please send to the [facility] in [facility].      Best call back number: [phone]

## 2022-09-02 RX ORDER — BLOOD-GLUCOSE TRANSMITTER
EACH MISCELLANEOUS
Status: CANCELLED | OUTPATIENT
Start: 2022-09-02

## 2022-09-02 RX ORDER — BLOOD-GLUCOSE SENSOR
EACH MISCELLANEOUS
Status: CANCELLED | OUTPATIENT
Start: 2022-09-02

## 2022-09-04 ENCOUNTER — TELEPHONE (OUTPATIENT)
Dept: PRIMARY CARE CLINIC | Age: 37
End: 2022-09-04

## 2022-09-04 RX ORDER — INSULIN GLARGINE 100 [IU]/ML
20 INJECTION, SOLUTION SUBCUTANEOUS NIGHTLY
Qty: 5 ADJUSTABLE DOSE PRE-FILLED PEN SYRINGE | Refills: 0 | Status: SHIPPED | OUTPATIENT
Start: 2022-09-04

## 2022-09-04 ASSESSMENT — ENCOUNTER SYMPTOMS: CHEST TIGHTNESS: 0

## 2022-09-06 RX ORDER — FLASH GLUCOSE SENSOR
1 KIT MISCELLANEOUS DAILY
Qty: 1 EACH | Refills: 0 | Status: SHIPPED | OUTPATIENT
Start: 2022-09-06 | End: 2022-10-03

## 2022-09-06 RX ORDER — FLASH GLUCOSE SENSOR
1 KIT MISCELLANEOUS
Qty: 1 EACH | Refills: 2 | Status: SHIPPED | OUTPATIENT
Start: 2022-09-06 | End: 2022-10-14 | Stop reason: SDUPTHER

## 2022-09-07 ENCOUNTER — TELEPHONE (OUTPATIENT)
Dept: PRIMARY CARE CLINIC | Age: 37
End: 2022-09-07

## 2022-09-07 NOTE — TELEPHONE ENCOUNTER
Spoke with patient. She has discontinued the NPH. She has obtained the Lantus and has CGM. AM blood sugar 180. Advised to take Lantus 15 units BID and To continue Lispro 20 units with lunch and dinner. States the CGM is working very well.

## 2022-10-03 RX ORDER — FLASH GLUCOSE SCANNING READER
EACH MISCELLANEOUS
Qty: 2 EACH | Refills: 3 | Status: SHIPPED | OUTPATIENT
Start: 2022-10-03

## 2022-10-03 NOTE — TELEPHONE ENCOUNTER
Medication:   Requested Prescriptions     Pending Prescriptions Disp Refills    Continuous Blood Gluc  (FREESTYLE SANDRA 14 DAY READER) [address] [Pharmacy Med Name: Jarek Blanton [address] [place] [address] [address]: USE AS DIRECTED DAILY        Last Filled:      Patient Phone Number: [phone] (home) [phone] (work)    Last appt: 8/30/2022   Next appt: Visit date not found    Last OARRS: No flowsheet data found.

## 2022-10-13 ENCOUNTER — TELEPHONE (OUTPATIENT)
Dept: PRIMARY CARE CLINIC | Age: 37
End: 2022-10-13

## 2022-10-13 RX ORDER — AMITRIPTYLINE HYDROCHLORIDE 25 MG/1
TABLET, FILM COATED ORAL
COMMUNITY
Start: 2022-08-22 | End: 2022-10-13 | Stop reason: CLARIF

## 2022-10-13 NOTE — TELEPHONE ENCOUNTER
Medication:   Requested Prescriptions     Pending Prescriptions Disp Refills    Continuous Blood Gluc Sensor ([address] [place] [address]) MISC 1 each 2     Si box by Does not apply route every 14 days        Last Filled:      Patient Phone Number: [phone] (home) [phone] (work)    Last appt: 2022   Next appt: Visit date not found    Last OARRS: No flowsheet data found.

## 2022-10-14 RX ORDER — FLASH GLUCOSE SENSOR
KIT MISCELLANEOUS
OUTPATIENT
Start: 2022-10-14

## 2022-10-14 RX ORDER — FLASH GLUCOSE SENSOR
1 KIT MISCELLANEOUS
Qty: 1 EACH | Refills: 2 | Status: SHIPPED | OUTPATIENT
Start: 2022-10-14 | End: 2022-11-22

## 2022-10-25 RX ORDER — AMITRIPTYLINE HYDROCHLORIDE 25 MG/1
TABLET, FILM COATED ORAL
Qty: 30 TABLET | Refills: 3 | OUTPATIENT
Start: 2022-10-25

## 2022-11-05 DIAGNOSIS — E11.42 DIABETIC POLYNEUROPATHY ASSOCIATED WITH TYPE 2 DIABETES MELLITUS (HCC): ICD-10-CM

## 2022-11-07 RX ORDER — GABAPENTIN 600 MG/1
600 TABLET ORAL 3 TIMES DAILY
Qty: 90 TABLET | Refills: 3 | Status: SHIPPED | OUTPATIENT
Start: 2022-11-07 | End: 2023-03-07

## 2022-11-21 NOTE — TELEPHONE ENCOUNTER
Medication:   Requested Prescriptions     Pending Prescriptions Disp Refills    Continuous Blood Gluc Sensor (FREESTYLE SANDRA 14 DAY SENSOR) MISC [Pharmacy Med Name: Jorge Araiza 14 DAY SENSOR]       Sig: CHANGE EVERY 14 DAYS        Last Filled:      Patient Phone Number: [phone] (home) [phone] (work)    Last appt: 8/30/2022   Next appt: Visit date not found    Last OARRS: No flowsheet data found.

## 2022-11-22 ENCOUNTER — TELEPHONE (OUTPATIENT)
Dept: PRIMARY CARE CLINIC | Age: 37
End: 2022-11-22

## 2022-11-22 RX ORDER — FLASH GLUCOSE SENSOR
KIT MISCELLANEOUS
Qty: 4 EACH | Refills: 12 | Status: SHIPPED | OUTPATIENT
Start: 2022-11-22

## 2022-12-22 ENCOUNTER — OFFICE VISIT (OUTPATIENT)
Dept: INTERNAL MEDICINE CLINIC | Age: 37
End: 2022-12-22
Payer: MEDICARE

## 2022-12-22 ENCOUNTER — TELEPHONE (OUTPATIENT)
Dept: INTERNAL MEDICINE CLINIC | Age: 37
End: 2022-12-22

## 2022-12-22 VITALS
SYSTOLIC BLOOD PRESSURE: 152 MMHG | BODY MASS INDEX: 45.99 KG/M2 | DIASTOLIC BLOOD PRESSURE: 110 MMHG | HEIGHT: 67 IN | WEIGHT: 293 LBS | OXYGEN SATURATION: 99 % | HEART RATE: 110 BPM

## 2022-12-22 DIAGNOSIS — E66.01 MORBID OBESITY (HCC): ICD-10-CM

## 2022-12-22 DIAGNOSIS — F41.9 ANXIETY: ICD-10-CM

## 2022-12-22 DIAGNOSIS — Z00.00 PREVENTATIVE HEALTH CARE: Primary | ICD-10-CM

## 2022-12-22 DIAGNOSIS — E11.65 UNCONTROLLED TYPE 2 DIABETES MELLITUS WITH HYPERGLYCEMIA (HCC): ICD-10-CM

## 2022-12-22 DIAGNOSIS — R35.0 URINARY FREQUENCY: ICD-10-CM

## 2022-12-22 DIAGNOSIS — I10 ESSENTIAL HYPERTENSION: ICD-10-CM

## 2022-12-22 DIAGNOSIS — E11.69 OBESITY, DIABETES, AND HYPERTENSION SYNDROME (HCC): ICD-10-CM

## 2022-12-22 DIAGNOSIS — E11.9 TYPE 2 DIABETES MELLITUS WITHOUT COMPLICATION, WITH LONG-TERM CURRENT USE OF INSULIN (HCC): ICD-10-CM

## 2022-12-22 DIAGNOSIS — E11.59 OBESITY, DIABETES, AND HYPERTENSION SYNDROME (HCC): ICD-10-CM

## 2022-12-22 DIAGNOSIS — I15.2 OBESITY, DIABETES, AND HYPERTENSION SYNDROME (HCC): ICD-10-CM

## 2022-12-22 DIAGNOSIS — N30.01 ACUTE CYSTITIS WITH HEMATURIA: ICD-10-CM

## 2022-12-22 DIAGNOSIS — G89.29 CHRONIC LOW BACK PAIN, UNSPECIFIED BACK PAIN LATERALITY, UNSPECIFIED WHETHER SCIATICA PRESENT: ICD-10-CM

## 2022-12-22 DIAGNOSIS — Z13.220 SCREENING FOR CHOLESTEROL LEVEL: ICD-10-CM

## 2022-12-22 DIAGNOSIS — M54.50 CHRONIC LOW BACK PAIN, UNSPECIFIED BACK PAIN LATERALITY, UNSPECIFIED WHETHER SCIATICA PRESENT: ICD-10-CM

## 2022-12-22 DIAGNOSIS — E66.9 OBESITY, DIABETES, AND HYPERTENSION SYNDROME (HCC): ICD-10-CM

## 2022-12-22 DIAGNOSIS — J45.40 MODERATE PERSISTENT ASTHMA, UNSPECIFIED WHETHER COMPLICATED: ICD-10-CM

## 2022-12-22 DIAGNOSIS — Z00.00 PREVENTATIVE HEALTH CARE: ICD-10-CM

## 2022-12-22 DIAGNOSIS — Z79.4 TYPE 2 DIABETES MELLITUS WITHOUT COMPLICATION, WITH LONG-TERM CURRENT USE OF INSULIN (HCC): ICD-10-CM

## 2022-12-22 LAB
A/G RATIO: 1.2 (ref 1.1–2.2)
ALBUMIN SERPL-MCNC: 3.8 G/DL (ref 3.4–5)
ALP BLD-CCNC: 128 U/L (ref 40–129)
ALT SERPL-CCNC: 8 U/L (ref 10–40)
ANION GAP SERPL CALCULATED.3IONS-SCNC: 16 MMOL/L (ref 3–16)
AST SERPL-CCNC: 8 U/L (ref 15–37)
BASOPHILS ABSOLUTE: 0 K/UL (ref 0–0.2)
BASOPHILS RELATIVE PERCENT: 0.7 %
BILIRUB SERPL-MCNC: 0.4 MG/DL (ref 0–1)
BILIRUBIN, POC: NORMAL
BLOOD URINE, POC: NORMAL
BUN BLDV-MCNC: 13 MG/DL (ref 7–20)
CALCIUM SERPL-MCNC: 10 MG/DL (ref 8.3–10.6)
CHLORIDE BLD-SCNC: 98 MMOL/L (ref 99–110)
CHOLESTEROL, TOTAL: 194 MG/DL (ref 0–199)
CLARITY, POC: NORMAL
CO2: 23 MMOL/L (ref 21–32)
COLOR, POC: NORMAL
CREAT SERPL-MCNC: 0.8 MG/DL (ref 0.6–1.1)
EOSINOPHILS ABSOLUTE: 0.1 K/UL (ref 0–0.6)
EOSINOPHILS RELATIVE PERCENT: 1.7 %
GFR SERPL CREATININE-BSD FRML MDRD: >60 ML/MIN/{1.73_M2}
GLUCOSE BLD-MCNC: 320 MG/DL (ref 70–99)
GLUCOSE URINE, POC: NORMAL
HBA1C MFR BLD: 11.7 %
HCT VFR BLD CALC: 41.7 % (ref 36–48)
HDLC SERPL-MCNC: 64 MG/DL (ref 40–60)
HEMOGLOBIN: 13.7 G/DL (ref 12–16)
KETONES, POC: NORMAL
LDL CHOLESTEROL CALCULATED: 103 MG/DL
LEUKOCYTE EST, POC: NORMAL
LYMPHOCYTES ABSOLUTE: 2.2 K/UL (ref 1–5.1)
LYMPHOCYTES RELATIVE PERCENT: 40.3 %
MCH RBC QN AUTO: 28.6 PG (ref 26–34)
MCHC RBC AUTO-ENTMCNC: 32.7 G/DL (ref 31–36)
MCV RBC AUTO: 87.4 FL (ref 80–100)
MONOCYTES ABSOLUTE: 0.5 K/UL (ref 0–1.3)
MONOCYTES RELATIVE PERCENT: 9.3 %
NEUTROPHILS ABSOLUTE: 2.6 K/UL (ref 1.7–7.7)
NEUTROPHILS RELATIVE PERCENT: 48 %
NITRITE, POC: POSITIVE
PDW BLD-RTO: 14.4 % (ref 12.4–15.4)
PH, POC: 6
PLATELET # BLD: 335 K/UL (ref 135–450)
PMV BLD AUTO: 8.8 FL (ref 5–10.5)
POTASSIUM SERPL-SCNC: 4.5 MMOL/L (ref 3.5–5.1)
PROTEIN, POC: NORMAL
RBC # BLD: 4.78 M/UL (ref 4–5.2)
SODIUM BLD-SCNC: 137 MMOL/L (ref 136–145)
SPECIFIC GRAVITY, POC: >=1.03
TOTAL PROTEIN: 7.1 G/DL (ref 6.4–8.2)
TRIGL SERPL-MCNC: 137 MG/DL (ref 0–150)
TSH SERPL DL<=0.05 MIU/L-ACNC: 2.21 UIU/ML (ref 0.27–4.2)
UROBILINOGEN, POC: NORMAL
VLDLC SERPL CALC-MCNC: 27 MG/DL
WBC # BLD: 5.4 K/UL (ref 4–11)

## 2022-12-22 PROCEDURE — 3074F SYST BP LT 130 MM HG: CPT | Performed by: INTERNAL MEDICINE

## 2022-12-22 PROCEDURE — 99204 OFFICE O/P NEW MOD 45 MIN: CPT | Performed by: INTERNAL MEDICINE

## 2022-12-22 PROCEDURE — 83036 HEMOGLOBIN GLYCOSYLATED A1C: CPT | Performed by: INTERNAL MEDICINE

## 2022-12-22 PROCEDURE — 3046F HEMOGLOBIN A1C LEVEL >9.0%: CPT | Performed by: INTERNAL MEDICINE

## 2022-12-22 PROCEDURE — G8427 DOCREV CUR MEDS BY ELIG CLIN: HCPCS | Performed by: INTERNAL MEDICINE

## 2022-12-22 PROCEDURE — 81002 URINALYSIS NONAUTO W/O SCOPE: CPT | Performed by: INTERNAL MEDICINE

## 2022-12-22 PROCEDURE — 2022F DILAT RTA XM EVC RTNOPTHY: CPT | Performed by: INTERNAL MEDICINE

## 2022-12-22 PROCEDURE — G8484 FLU IMMUNIZE NO ADMIN: HCPCS | Performed by: INTERNAL MEDICINE

## 2022-12-22 PROCEDURE — 1036F TOBACCO NON-USER: CPT | Performed by: INTERNAL MEDICINE

## 2022-12-22 PROCEDURE — G8417 CALC BMI ABV UP PARAM F/U: HCPCS | Performed by: INTERNAL MEDICINE

## 2022-12-22 PROCEDURE — 3078F DIAST BP <80 MM HG: CPT | Performed by: INTERNAL MEDICINE

## 2022-12-22 PROCEDURE — G0438 PPPS, INITIAL VISIT: HCPCS | Performed by: INTERNAL MEDICINE

## 2022-12-22 RX ORDER — AMLODIPINE BESYLATE 5 MG/1
5 TABLET ORAL DAILY
Qty: 90 TABLET | Refills: 0 | Status: SHIPPED | OUTPATIENT
Start: 2022-12-22 | End: 2023-03-22

## 2022-12-22 RX ORDER — SULFAMETHOXAZOLE AND TRIMETHOPRIM 800; 160 MG/1; MG/1
1 TABLET ORAL 2 TIMES DAILY
Qty: 14 TABLET | Refills: 0 | Status: SHIPPED | OUTPATIENT
Start: 2022-12-22 | End: 2022-12-29

## 2022-12-22 RX ORDER — TIZANIDINE 4 MG/1
TABLET ORAL
Qty: 60 TABLET | Refills: 3 | Status: CANCELLED | OUTPATIENT
Start: 2022-12-22

## 2022-12-22 RX ORDER — VALSARTAN 80 MG/1
80 TABLET ORAL DAILY
Qty: 90 TABLET | Refills: 1 | Status: SHIPPED | OUTPATIENT
Start: 2022-12-22

## 2022-12-22 RX ORDER — TIZANIDINE 4 MG/1
TABLET ORAL
Qty: 60 TABLET | Refills: 3 | Status: SHIPPED | OUTPATIENT
Start: 2022-12-22

## 2022-12-22 RX ORDER — METOPROLOL SUCCINATE 50 MG/1
50 TABLET, EXTENDED RELEASE ORAL DAILY
Qty: 90 TABLET | Refills: 0 | Status: SHIPPED | OUTPATIENT
Start: 2022-12-22

## 2022-12-22 RX ORDER — BUSPIRONE HYDROCHLORIDE 5 MG/1
5 TABLET ORAL 3 TIMES DAILY PRN
Qty: 90 TABLET | Refills: 0 | Status: SHIPPED | OUTPATIENT
Start: 2022-12-22 | End: 2023-01-21

## 2022-12-22 RX ORDER — INSULIN ASPART 100 [IU]/ML
INJECTION, SOLUTION INTRAVENOUS; SUBCUTANEOUS
Qty: 5 ADJUSTABLE DOSE PRE-FILLED PEN SYRINGE | Refills: 2 | Status: SHIPPED | OUTPATIENT
Start: 2022-12-22

## 2022-12-22 RX ORDER — INSULIN GLARGINE 100 [IU]/ML
20 INJECTION, SOLUTION SUBCUTANEOUS NIGHTLY
Qty: 5 ADJUSTABLE DOSE PRE-FILLED PEN SYRINGE | Refills: 0 | Status: SHIPPED | OUTPATIENT
Start: 2022-12-22

## 2022-12-22 ASSESSMENT — ENCOUNTER SYMPTOMS
SHORTNESS OF BREATH: 0
BLURRED VISION: 0
VISUAL CHANGE: 0
VOMITING: 0

## 2022-12-22 NOTE — PROGRESS NOTES
Perry Ledesma (:  1985) is a 40 y.o. female,New patient, here for evaluation of the following chief complaint(s):  Established New Doctor and Urinary Frequency         ASSESSMENT/PLAN:  1. Preventative health care  -     CBC with Auto Differential; Future  -     Comprehensive Metabolic Panel; Future  2. Essential hypertension  -     valsartan (DIOVAN) 80 MG tablet; Take 1 tablet by mouth daily, Disp-90 tablet, R-1Normal  -     TSH; Future  3. Uncontrolled type 2 diabetes mellitus with hyperglycemia (HCC)  -     POCT glycosylated hemoglobin (Hb A1C)  -     Hemoglobin A1C; Future  4. Morbid obesity ([facility] [address]  5. Moderate persistent asthma, unspecified whether complicated  6. Anxiety  -     busPIRone (BUSPAR) 5 MG tablet; Take 1 tablet by mouth 3 times daily as needed (ANXEITY), Disp-90 tablet, R-0Normal  7. Acute cystitis with hematuria  -     sulfamethoxazole-trimethoprim (BACTRIM DS;SEPTRA DS) 800-160 MG per tablet; Take 1 tablet by mouth 2 times daily for 7 days, Disp-14 tablet, R-0Normal  8. Obesity, diabetes, and hypertension syndrome (HCC)  -     amLODIPine (NORVASC) 5 MG tablet; Take 1 tablet by mouth daily, Disp-90 tablet, R-0Normal  -     insulin glargine (LANTUS SOLOSTAR) 100 UNIT/ML injection pen; Inject 20 Units into the skin nightly, Disp-5 Adjustable Dose Pre-filled Pen Syringe, R-0Normal  -     metoprolol succinate (TOPROL XL) 50 MG extended release tablet; Take 1 tablet by mouth daily, Disp-90 tablet, R-0Normal  9. Type 2 diabetes mellitus without complication, with long-term current use of insulin (HCC)  -     insulin aspart (NOVOLOG FLEXPEN) 100 UNIT/ML injection pen; 25 units at breakfast; 25 at dinner., Disp-5 Adjustable Dose Pre-filled Pen Syringe, R-2Normal  10. Screening for cholesterol level  -     Lipid Panel; Future  11.  Chronic low back pain, unspecified back pain laterality, unspecified whether sciatica present  -     tiZANidine (ZANAFLEX) 4 MG tablet; TAKE ONE TABLET BY MOUTH TWICE A DAY AS NEEDED FOR MUSCLE SPASMS, Disp-60 tablet, R-3Normal  12. Urinary frequency  -     POCT Urinalysis no Micro  Reviewed patient her current medical issues unfortunately patient has been noncompliant with her treatment she is taking her insulin but she is not doing any sliding scale she has not been taking any of her antihypertensive medication resulting in extremely high blood pressure patient understand the extreme risk of not following with the plan of care including having strokes heart attack and even death    At this point we can restart the patient back on her medication get her baseline labs and decide on the next steps of treatment    From a preventive perspective patient declined to have any vaccination I did discuss with her the importance of doing her blood tests as well as eating healthy and trying to exercise on regular basis and will get a continue those discussions with the patient      Return in about 1 week (around 12/29/2022).          Subjective   SUBJECTIVE/OBJECTIVE:    Lab Review   Lab Results   Component Value Date/Time     07/14/2022 09:48 AM     11/03/2021 05:41 PM     11/03/2021 09:09 AM    K 4.4 07/14/2022 09:48 AM    K 3.8 11/03/2021 05:41 PM    K 3.5 11/03/2021 09:09 AM    K 4.1 11/02/2021 05:11 PM    K 4.7 10/25/2021 03:33 PM    K 3.7 06/14/2020 08:40 PM    CO2 26 07/14/2022 09:48 AM    CO2 20 11/03/2021 05:41 PM    CO2 20 11/03/2021 09:09 AM    BUN 13 07/14/2022 09:48 AM    BUN 9 11/03/2021 05:41 PM    BUN 7 11/03/2021 09:09 AM    CREATININE 0.7 07/14/2022 09:48 AM    CREATININE 0.6 11/03/2021 05:41 PM    CREATININE 0.5 11/03/2021 09:09 AM    GLUCOSE 143 07/14/2022 09:48 AM    GLUCOSE 427 03/13/2022 01:26 PM    GLUCOSE 253 11/03/2021 05:41 PM    CALCIUM 9.3 07/14/2022 09:48 AM    CALCIUM 8.5 11/03/2021 05:41 PM    CALCIUM 8.6 11/03/2021 09:09 AM     Lab Results   Component Value Date/Time    WBC 6.1 07/14/2022 09:48 AM    WBC 6.3 11/04/2021 04:00 AM    WBC 5.9 11/03/2021 04:43 AM    HGB 12.2 07/14/2022 09:48 AM    HGB 10.9 11/04/2021 04:00 AM    HGB 11.6 11/03/2021 05:41 PM    HCT 37.0 07/14/2022 09:48 AM    HCT 33.2 11/04/2021 04:00 AM    HCT 34.8 11/03/2021 05:41 PM    MCV 86.5 07/14/2022 09:48 AM    MCV 87.7 11/04/2021 04:00 AM    MCV 88.4 11/03/2021 04:43 AM     07/14/2022 09:48 AM     11/04/2021 04:00 AM     11/03/2021 04:43 AM     Lab Results   Component Value Date/Time    CHOL 142 07/22/2014 10:42 AM    CHOL 135 02/28/2013 11:15 AM    CHOL 116 09/27/2012 09:51 AM    TRIG 118 07/22/2014 10:42 AM    TRIG 92 02/28/2013 11:15 AM    TRIG 130 09/27/2012 09:51 AM    HDL 57 07/22/2014 10:42 AM    HDL 59 02/28/2013 11:15 AM    HDL 46 09/27/2012 09:51 AM       Vitals 12/22/2022 8/30/2022 7/66/2746   SYSTOLIC [phone]   DIASTOLIC 703 95 248   Site - Left Upper Arm Right Upper Arm   Position - - Sitting   Pulse 110 120 118   Temp - - 96.9   Resp - - -   SpO2 99 - (No Data)   Weight 302 lb - 294 lb   Height 5' 7\" - 5' 7\"   Body mass index 47.29 kg/m2 - 46.04 kg/m2   Pain Level - - -   Some recent data might be hidden       Urinary Frequency   This is a new problem. The current episode started in the past 7 days. The quality of the pain is described as burning and aching. There has been no fever. Associated symptoms include frequency and urgency. Pertinent negatives include no chills, discharge, hematuria, hesitancy, possible pregnancy, sweats or vomiting. Diabetes  She presents for her follow-up diabetic visit. She has type 2 diabetes mellitus. Her disease course has been worsening. Pertinent negatives for hypoglycemia include no sweats. Pertinent negatives for diabetes include no blurred vision, no chest pain, no fatigue, no foot paresthesias, no foot ulcerations, no polydipsia, no polyphagia, no polyuria, no visual change, no weakness and no weight loss. Hypertension  This is a chronic problem. The current episode started more than 1 year ago.  The problem has been rapidly worsening since onset. The problem is uncontrolled. Pertinent negatives include no blurred vision, chest pain, peripheral edema, PND, shortness of breath or sweats. Review of Systems   Constitutional:  Negative for chills, fatigue and weight loss. Eyes:  Negative for blurred vision. Respiratory:  Negative for shortness of breath. Cardiovascular:  Negative for chest pain and PND. Gastrointestinal:  Negative for vomiting. Endocrine: Negative for polydipsia, polyphagia and polyuria. Genitourinary:  Positive for frequency and urgency. Negative for hematuria and hesitancy. Neurological:  Negative for weakness. Objective   Physical Exam       This dictation was generated by voice recognition computer software. Although all attempts are made to edit the dictation for accuracy, there may be errors in the transcription that are not intended. An electronic signature was used to authenticate this note.     --Jericho Mansfield MD

## 2022-12-22 NOTE — PROGRESS NOTES
We had this discussion during the visit the patient requested to have it 2 tablets twice a day or 3 tablets a day and I advised the patient that given that her symptoms are chronic we will only can use it on as-needed basis and on twice a day only

## 2022-12-22 NOTE — TELEPHONE ENCOUNTER
Pt calling---saw you this morning as New Pt---said you were suppose to change the Tizanidine to 3 times a day----can you send new script for that medicine please. Thanks.

## 2022-12-23 LAB
ESTIMATED AVERAGE GLUCOSE: 280.5 MG/DL
HBA1C MFR BLD: 11.4 %

## 2022-12-24 DIAGNOSIS — I10 ESSENTIAL HYPERTENSION: ICD-10-CM

## 2022-12-27 RX ORDER — VALSARTAN 80 MG/1
TABLET ORAL
Qty: 90 TABLET | Refills: 1 | OUTPATIENT
Start: 2022-12-27

## 2022-12-29 ENCOUNTER — OFFICE VISIT (OUTPATIENT)
Dept: INTERNAL MEDICINE CLINIC | Age: 37
End: 2022-12-29
Payer: MEDICARE

## 2022-12-29 VITALS
WEIGHT: 293 LBS | SYSTOLIC BLOOD PRESSURE: 138 MMHG | DIASTOLIC BLOOD PRESSURE: 84 MMHG | HEIGHT: 67 IN | BODY MASS INDEX: 45.99 KG/M2 | HEART RATE: 67 BPM

## 2022-12-29 DIAGNOSIS — F41.9 ANXIETY: ICD-10-CM

## 2022-12-29 DIAGNOSIS — I10 ESSENTIAL HYPERTENSION: ICD-10-CM

## 2022-12-29 DIAGNOSIS — E11.65 UNCONTROLLED TYPE 2 DIABETES MELLITUS WITH HYPERGLYCEMIA (HCC): Primary | ICD-10-CM

## 2022-12-29 DIAGNOSIS — K21.9 GASTROESOPHAGEAL REFLUX DISEASE WITHOUT ESOPHAGITIS: ICD-10-CM

## 2022-12-29 PROCEDURE — 3074F SYST BP LT 130 MM HG: CPT | Performed by: INTERNAL MEDICINE

## 2022-12-29 PROCEDURE — 99214 OFFICE O/P EST MOD 30 MIN: CPT | Performed by: INTERNAL MEDICINE

## 2022-12-29 PROCEDURE — G8427 DOCREV CUR MEDS BY ELIG CLIN: HCPCS | Performed by: INTERNAL MEDICINE

## 2022-12-29 PROCEDURE — G8484 FLU IMMUNIZE NO ADMIN: HCPCS | Performed by: INTERNAL MEDICINE

## 2022-12-29 PROCEDURE — 2022F DILAT RTA XM EVC RTNOPTHY: CPT | Performed by: INTERNAL MEDICINE

## 2022-12-29 PROCEDURE — 3046F HEMOGLOBIN A1C LEVEL >9.0%: CPT | Performed by: INTERNAL MEDICINE

## 2022-12-29 PROCEDURE — 3078F DIAST BP <80 MM HG: CPT | Performed by: INTERNAL MEDICINE

## 2022-12-29 PROCEDURE — G8417 CALC BMI ABV UP PARAM F/U: HCPCS | Performed by: INTERNAL MEDICINE

## 2022-12-29 PROCEDURE — 1036F TOBACCO NON-USER: CPT | Performed by: INTERNAL MEDICINE

## 2022-12-29 RX ORDER — EXENATIDE 250 UG/ML
5 INJECTION SUBCUTANEOUS 2 TIMES DAILY WITH MEALS
Qty: 1.2 ML | Refills: 3 | Status: SHIPPED | OUTPATIENT
Start: 2022-12-29

## 2022-12-29 RX ORDER — OMEPRAZOLE 40 MG/1
40 CAPSULE, DELAYED RELEASE ORAL
Qty: 30 CAPSULE | Refills: 0 | Status: SHIPPED | OUTPATIENT
Start: 2022-12-29

## 2022-12-29 RX ORDER — PEN NEEDLE, DIABETIC 30 GX5/16"
1 NEEDLE, DISPOSABLE MISCELLANEOUS DAILY
Qty: 100 EACH | Refills: 3 | Status: SHIPPED | OUTPATIENT
Start: 2022-12-29

## 2022-12-29 ASSESSMENT — ENCOUNTER SYMPTOMS
HYPERVENTILATION: 0
WHEEZING: 0
WATER BRASH: 0
GLOBUS SENSATION: 1
SORE THROAT: 0
FEELING OF CHOKING: 0
BLURRED VISION: 0
SHORTNESS OF BREATH: 0
HEARTBURN: 1
VISUAL CHANGE: 0
STRIDOR: 0

## 2022-12-29 NOTE — PROGRESS NOTES
Elke Pyle (:  1985) is a 40 y.o. female,Established patient, here for evaluation of the following chief complaint(s):  Follow-up (Pt here for 1 wk follow up)         ASSESSMENT/PLAN:  1. Uncontrolled type 2 diabetes mellitus with hyperglycemia (HCC)  Assessment & Plan:  Reviewed with patient her diabetic control unfortunately it is very poor with her sugar ranging between 4-500 average and an A1c of about 12 or more the patient understand the long-term ramifications including having kidney damage and dialysis as well as having heart disease strokes and even death it is extremely important that we get this under control she understands the need for lifestyle modifications we will can also adjust her medication and add hiatal hopefully that will help her both with her sugar control as well as the weight loss we can keep the patient on the close follow-up rotation until we get her at least below 9  Orders:  -     exenatide (BYETTA 5 MCG PEN) 5 MCG/0.02ML injection; Inject 0.02 mLs into the skin 2 times daily (with meals), Disp-1.2 mL, R-3Normal  -     Microalbumin / Creatinine Urine Ratio; Future  -     Insulin Pen Needle (PEN NEEDLES 3/16\") 31G X 5 MM MISC; DAILY Starting u 2022, Disp-100 each, R-3, Normal  2. Essential hypertension  Assessment & Plan:  Pressures well controlled  3. Gastroesophageal reflux disease without esophagitis  Assessment & Plan:  Consistent with acid reflux disease she is advised to take her new prescribed omeprazole every morning on an empty stomach and eat something within 20 to 30 minutes after that for optimal  Orders:  -     omeprazole (PRILOSEC) 40 MG delayed release capsule; Take 1 capsule by mouth every morning (before breakfast), Disp-30 capsule, R-0Normal  4.  Anxiety  -     Ambulatory referral to Psychology  Is under a lot of stress at this stage I discussed with her different options including medications she is not interested in any pharmacologic intervention she was agreeable though to have psychology and therapy referral patient does not pose any danger to herself or anyone else so we will get a refill continue to work closely with her  Return in about 6 weeks (around 2/9/2023).          Subjective   SUBJECTIVE/OBJECTIVE:    Lab Review   Lab Results   Component Value Date/Time     12/22/2022 09:10 AM     07/14/2022 09:48 AM     11/03/2021 05:41 PM    K 4.5 12/22/2022 09:10 AM    K 4.4 07/14/2022 09:48 AM    K 3.8 11/03/2021 05:41 PM    K 4.1 11/02/2021 05:11 PM    K 4.7 10/25/2021 03:33 PM    K 3.7 06/14/2020 08:40 PM    CO2 23 12/22/2022 09:10 AM    CO2 26 07/14/2022 09:48 AM    CO2 20 11/03/2021 05:41 PM    BUN 13 12/22/2022 09:10 AM    BUN 13 07/14/2022 09:48 AM    BUN 9 11/03/2021 05:41 PM    CREATININE 0.8 12/22/2022 09:10 AM    CREATININE 0.7 07/14/2022 09:48 AM    CREATININE 0.6 11/03/2021 05:41 PM    GLUCOSE 320 12/22/2022 09:10 AM    GLUCOSE 143 07/14/2022 09:48 AM    GLUCOSE 427 03/13/2022 01:26 PM    CALCIUM 10.0 12/22/2022 09:10 AM    CALCIUM 9.3 07/14/2022 09:48 AM    CALCIUM 8.5 11/03/2021 05:41 PM     Lab Results   Component Value Date/Time    WBC 5.4 12/22/2022 09:10 AM    WBC 6.1 07/14/2022 09:48 AM    WBC 6.3 11/04/2021 04:00 AM    HGB 13.7 12/22/2022 09:10 AM    HGB 12.2 07/14/2022 09:48 AM    HGB 10.9 11/04/2021 04:00 AM    HCT 41.7 12/22/2022 09:10 AM    HCT 37.0 07/14/2022 09:48 AM    HCT 33.2 11/04/2021 04:00 AM    MCV 87.4 12/22/2022 09:10 AM    MCV 86.5 07/14/2022 09:48 AM    MCV 87.7 11/04/2021 04:00 AM     12/22/2022 09:10 AM     07/14/2022 09:48 AM     11/04/2021 04:00 AM     Lab Results   Component Value Date/Time    CHOL 194 12/22/2022 09:10 AM    CHOL 142 07/22/2014 10:42 AM    CHOL 135 02/28/2013 11:15 AM    TRIG 137 12/22/2022 09:10 AM    TRIG 118 07/22/2014 10:42 AM    TRIG 92 02/28/2013 11:15 AM    HDL 64 12/22/2022 09:10 AM    HDL 57 07/22/2014 10:42 AM    HDL 59 02/28/2013 11:15 AM       Vitals 12/29/2022 12/22/2022 2/31/7501   SYSTOLIC [phone]   DIASTOLIC 84 147 95   Site - - Left Upper Arm   Position - - -   Pulse 67 110 120   Temp - - -   Resp - - -   SpO2 - 99 -   Weight 300 lb 12.8 oz 302 lb -   Height 5' 7\" 5' 7\" -   Body mass index 47.11 kg/m2 47.29 kg/m2 -   Pain Level - - -   Some recent data might be hidden       Diabetes  She presents for her follow-up diabetic visit. She has type 2 diabetes mellitus. Her disease course has been fluctuating. Hypoglycemia symptoms include nervousness/anxiousness. Pertinent negatives for hypoglycemia include no dizziness. Pertinent negatives for diabetes include no blurred vision, no chest pain, no fatigue, no visual change, no weakness and no weight loss. Pertinent negatives for diabetic complications include no impotence. Hypertension  This is a chronic problem. The current episode started more than 1 year ago. The problem is unchanged. The problem is controlled. Associated symptoms include anxiety. Pertinent negatives include no blurred vision, chest pain, palpitations or shortness of breath. Gastroesophageal Reflux  She complains of globus sensation and heartburn. She reports no chest pain, no sore throat, no stridor, no tooth decay, no water brash or no wheezing. Pertinent negatives include no fatigue or weight loss. Anxiety  Presents for initial visit. Onset was 1 to 6 months ago. Symptoms include depressed mood, excessive worry, irritability and nervous/anxious behavior. Patient reports no chest pain, compulsions, dizziness, dry mouth, feeling of choking, hyperventilation, impotence, malaise, muscle tension, obsessions, palpitations, panic, shortness of breath or suicidal ideas. Review of Systems   Constitutional:  Positive for irritability. Negative for fatigue and weight loss. HENT:  Negative for sore throat. Eyes:  Negative for blurred vision. Respiratory:  Negative for shortness of breath and wheezing.     Cardiovascular: Negative for chest pain and palpitations. Gastrointestinal:  Positive for heartburn. Genitourinary:  Negative for impotence. Neurological:  Negative for dizziness and weakness. Psychiatric/Behavioral:  Negative for suicidal ideas. The patient is nervous/anxious. Objective   Physical Exam       This dictation was generated by voice recognition computer software. Although all attempts are made to edit the dictation for accuracy, there may be errors in the transcription that are not intended. An electronic signature was used to authenticate this note.     --Samantha Navarro MD

## 2022-12-29 NOTE — ASSESSMENT & PLAN NOTE
Reviewed with patient her diabetic control unfortunately it is very poor with her sugar ranging between 4-500 average and an A1c of about 12 or more the patient understand the long-term ramifications including having kidney damage and dialysis as well as having heart disease strokes and even death it is extremely important that we get this under control she understands the need for lifestyle modifications we will can also adjust her medication and add hiatal hopefully that will help her both with her sugar control as well as the weight loss we can keep the patient on the close follow-up rotation until we get her at least below 9

## 2022-12-29 NOTE — ASSESSMENT & PLAN NOTE
Consistent with acid reflux disease she is advised to take her new prescribed omeprazole every morning on an empty stomach and eat something within 20 to 30 minutes after that for optimal

## 2023-01-05 NOTE — PLAN OF CARE
Problem: Falls - Risk of:  Goal: Will remain free from falls  Description: Will remain free from falls  11/4/2021 1243 by Daniella Michelle RN  Outcome: Ongoing  Note: Patient remains free from falls during this shift. Fall precautions remain in place. Patient educated on the need to implement call light use prior to ambulation. Will continue to monitor and assess. Problem: Falls - Risk of:  Goal: Absence of physical injury  Description: Absence of physical injury  11/4/2021 1243 by Daniella Michelle RN  Outcome: Ongoing  Note: Pt is free of injury. No physical injury noted. Fall precautions in place. Call light within reach. Will monitor.
Problem: Falls - Risk of:  Goal: Will remain free from falls  Description: Will remain free from falls  Outcome: Met This Shift     Problem: Falls - Risk of:  Goal: Absence of physical injury  Description: Absence of physical injury  Outcome: Ongoing
Problem: Falls - Risk of:  Goal: Will remain free from falls  Description: Will remain free from falls  Outcome: Ongoing  Note: Patient remains free from falls during this shift. Fall precautions remain in place. Patient educated on the need to implement call light use prior to ambulation. Will continue to monitor and assess. Problem: Falls - Risk of:  Goal: Absence of physical injury  Description: Absence of physical injury  Outcome: Ongoing  Note: Patient remains free from physical injury during this shift. Will continue to monitor and assess patient.
.

## 2023-01-19 DIAGNOSIS — F41.9 ANXIETY: ICD-10-CM

## 2023-01-19 RX ORDER — BUSPIRONE HYDROCHLORIDE 5 MG/1
TABLET ORAL
Qty: 90 TABLET | Refills: 0 | Status: SHIPPED | OUTPATIENT
Start: 2023-01-19

## 2023-02-01 NOTE — TELEPHONE ENCOUNTER
Health Maintenance Due   Topic Date Due   • DM/CKD Microalbumin  09/16/2021   • DTaP/Tdap/Td Vaccine (2 - Td or Tdap) 09/11/2022   • Medicare Advantage- Medicare Wellness Visit  01/01/2023   • Diabetes Eye Exam  01/21/2023   • Diabetes Foot Exam  01/21/2023   • Lung Cancer Screening  03/09/2023   • Colorectal Cancer Risk - Colonoscopy  03/23/2023   • Depression Screening  08/01/2023       Patient is due for the topics as listed above and wishes to proceed with them. Orders placed for MWV (Medicare Wellness Visit).     Last OV: 12/29/2022  Next OV: 2/9/2023      Last fill:12/22/22  Refills:0

## 2023-02-15 DIAGNOSIS — F41.9 ANXIETY: ICD-10-CM

## 2023-02-15 RX ORDER — BUSPIRONE HYDROCHLORIDE 5 MG/1
TABLET ORAL
Qty: 90 TABLET | Refills: 0 | OUTPATIENT
Start: 2023-02-15

## 2023-02-20 ENCOUNTER — TELEPHONE (OUTPATIENT)
Dept: INTERNAL MEDICINE CLINIC | Age: 38
End: 2023-02-20

## 2023-02-20 DIAGNOSIS — E11.59 OBESITY, DIABETES, AND HYPERTENSION SYNDROME (HCC): ICD-10-CM

## 2023-02-20 DIAGNOSIS — E11.65 UNCONTROLLED TYPE 2 DIABETES MELLITUS WITH HYPERGLYCEMIA (HCC): ICD-10-CM

## 2023-02-20 DIAGNOSIS — E11.69 OBESITY, DIABETES, AND HYPERTENSION SYNDROME (HCC): ICD-10-CM

## 2023-02-20 DIAGNOSIS — I15.2 OBESITY, DIABETES, AND HYPERTENSION SYNDROME (HCC): ICD-10-CM

## 2023-02-20 DIAGNOSIS — E66.9 OBESITY, DIABETES, AND HYPERTENSION SYNDROME (HCC): ICD-10-CM

## 2023-02-20 DIAGNOSIS — Z79.4 TYPE 2 DIABETES MELLITUS WITHOUT COMPLICATION, WITH LONG-TERM CURRENT USE OF INSULIN (HCC): ICD-10-CM

## 2023-02-20 DIAGNOSIS — E11.9 TYPE 2 DIABETES MELLITUS WITHOUT COMPLICATION, WITH LONG-TERM CURRENT USE OF INSULIN (HCC): ICD-10-CM

## 2023-02-20 DIAGNOSIS — G62.9 NEUROPATHY: ICD-10-CM

## 2023-02-20 DIAGNOSIS — K21.9 GASTROESOPHAGEAL REFLUX DISEASE WITHOUT ESOPHAGITIS: ICD-10-CM

## 2023-02-20 DIAGNOSIS — F41.9 ANXIETY: ICD-10-CM

## 2023-02-20 RX ORDER — OMEPRAZOLE 40 MG/1
40 CAPSULE, DELAYED RELEASE ORAL
Qty: 30 CAPSULE | Refills: 0 | Status: SHIPPED | OUTPATIENT
Start: 2023-02-20

## 2023-02-20 RX ORDER — PEN NEEDLE, DIABETIC 30 GX5/16"
1 NEEDLE, DISPOSABLE MISCELLANEOUS DAILY
Qty: 100 EACH | Refills: 3 | Status: SHIPPED | OUTPATIENT
Start: 2023-02-20

## 2023-02-20 RX ORDER — BUSPIRONE HYDROCHLORIDE 5 MG/1
5 TABLET ORAL 3 TIMES DAILY PRN
Qty: 90 TABLET | Refills: 0 | Status: SHIPPED | OUTPATIENT
Start: 2023-02-20 | End: 2023-03-22

## 2023-02-20 RX ORDER — INSULIN ASPART 100 [IU]/ML
INJECTION, SOLUTION INTRAVENOUS; SUBCUTANEOUS
Qty: 5 ADJUSTABLE DOSE PRE-FILLED PEN SYRINGE | Refills: 2 | Status: SHIPPED | OUTPATIENT
Start: 2023-02-20

## 2023-02-20 RX ORDER — EXENATIDE 250 UG/ML
5 INJECTION SUBCUTANEOUS 2 TIMES DAILY WITH MEALS
Qty: 1.2 ML | Refills: 3 | Status: SHIPPED | OUTPATIENT
Start: 2023-02-20

## 2023-02-20 NOTE — TELEPHONE ENCOUNTER
Pt calling requesting refill of  Duloxentine     Last written  7/14/22 by previous Dr Renato Cody 12/9/22  Next OV   3/2/23  Last recommended OV    NA    Please send to   [facility]

## 2023-02-21 RX ORDER — DULOXETINE 40 MG/1
40 CAPSULE, DELAYED RELEASE ORAL DAILY
Qty: 30 CAPSULE | Refills: 0 | Status: SHIPPED | OUTPATIENT
Start: 2023-02-21

## 2023-02-22 ENCOUNTER — HOSPITAL ENCOUNTER (EMERGENCY)
Age: 38
Discharge: HOME | End: 2023-02-22
Payer: COMMERCIAL

## 2023-02-22 VITALS
RESPIRATION RATE: 16 BRPM | TEMPERATURE: 98.2 F | SYSTOLIC BLOOD PRESSURE: 199 MMHG | DIASTOLIC BLOOD PRESSURE: 118 MMHG | HEART RATE: 97 BPM | OXYGEN SATURATION: 100 %

## 2023-02-22 DIAGNOSIS — R03.0 ELEVATED BLOOD PRESSURE READING: Primary | ICD-10-CM

## 2023-02-22 DIAGNOSIS — R60.0 BILATERAL LOWER EXTREMITY EDEMA: ICD-10-CM

## 2023-02-22 LAB
ANION GAP SERPL CALCULATED.3IONS-SCNC: 10 MMOL/L (ref 3–16)
BASOPHILS ABSOLUTE: 0 K/UL (ref 0–0.2)
BASOPHILS RELATIVE PERCENT: 0.6 %
BUN BLDV-MCNC: 14 MG/DL (ref 7–20)
CALCIUM SERPL-MCNC: 9.1 MG/DL (ref 8.3–10.6)
CHLORIDE BLD-SCNC: 100 MMOL/L (ref 99–110)
CO2: 24 MMOL/L (ref 21–32)
CREAT SERPL-MCNC: 0.7 MG/DL (ref 0.6–1.1)
EOSINOPHILS ABSOLUTE: 0.1 K/UL (ref 0–0.6)
EOSINOPHILS RELATIVE PERCENT: 1.1 %
GFR SERPL CREATININE-BSD FRML MDRD: >60 ML/MIN/{1.73_M2}
GLUCOSE BLD-MCNC: 362 MG/DL (ref 70–99)
HCT VFR BLD CALC: 39.2 % (ref 36–48)
HEMOGLOBIN: 12.6 G/DL (ref 12–16)
LYMPHOCYTES ABSOLUTE: 2.4 K/UL (ref 1–5.1)
LYMPHOCYTES RELATIVE PERCENT: 42.9 %
MCH RBC QN AUTO: 28 PG (ref 26–34)
MCHC RBC AUTO-ENTMCNC: 32.1 G/DL (ref 31–36)
MCV RBC AUTO: 87 FL (ref 80–100)
MONOCYTES ABSOLUTE: 0.3 K/UL (ref 0–1.3)
MONOCYTES RELATIVE PERCENT: 5.6 %
NEUTROPHILS ABSOLUTE: 2.8 K/UL (ref 1.7–7.7)
NEUTROPHILS RELATIVE PERCENT: 49.8 %
PDW BLD-RTO: 14.3 % (ref 12.4–15.4)
PLATELET # BLD: 303 K/UL (ref 135–450)
PMV BLD AUTO: 8.1 FL (ref 5–10.5)
POTASSIUM SERPL-SCNC: 4.4 MMOL/L (ref 3.5–5.1)
PRO-BNP: 97 PG/ML (ref 0–124)
RBC # BLD: 4.5 M/UL (ref 4–5.2)
SODIUM BLD-SCNC: 134 MMOL/L (ref 136–145)
WBC # BLD: 5.7 K/UL (ref 4–11)

## 2023-02-22 PROCEDURE — 80048 BASIC METABOLIC PNL TOTAL CA: CPT

## 2023-02-22 PROCEDURE — 99284 EMERGENCY DEPT VISIT MOD MDM: CPT

## 2023-02-22 PROCEDURE — 85025 COMPLETE CBC W/AUTO DIFF WBC: CPT

## 2023-02-22 PROCEDURE — 83880 ASSAY OF NATRIURETIC PEPTIDE: CPT

## 2023-02-22 PROCEDURE — 93971 EXTREMITY STUDY: CPT

## 2023-02-22 ASSESSMENT — ENCOUNTER SYMPTOMS
RHINORRHEA: 0
COUGH: 0
DIARRHEA: 0
VOMITING: 0
NAUSEA: 0
WHEEZING: 0
SHORTNESS OF BREATH: 0
ABDOMINAL PAIN: 0

## 2023-02-22 NOTE — ED PROVIDER NOTES
[facility]        Pt Name: Katherine Hair  MRN: [id number]  [place] 1985  Date of evaluation: 2/22/2023  Provider: Mansoor Keller PA-C  PCP: Susan Gilman MD  Note Started: 2:39 PM EST 2/22/23      WENDIE. I have evaluated this patient. My supervising physician was available for consultation. CHIEF COMPLAINT       Chief Complaint   Patient presents with    Leg Swelling     Pt states that her left calf is swollen and both her hands for about a week. Pt also states body feels sore everywhere. Pt was seen at urgent care and sent to be checked for cellulitis. HISTORY OF PRESENT ILLNESS: 1 or more Elements     History From: patient  Limitations to history : None    Katherine Hair is a 45 y.o. female who presents for evaluation of swollen left calf. Patient states that her feet and hands have been swollen for about a week as well. States that she feels sore everywhere. She was seen in urgent care and sent here apparently to rule out cellulitis? She denies any fevers. No injury or trauma. No numbness tingling or weakness distally. Patient states that this morning in the shower she also noticed that she can see the veins in her legs and in her breasts, states that she has never noticed this before and it is \"freaking her out. \"  She denies any chest pain or shortness of breath. She is not a smoker. No recent travel, hospitalizations or operations. She is not on birth control or any other hormone therapy. No personal or family history of blood clots. She is not anticoagulated. Please she has no other complaints or concerns at this time. Nursing Notes were all reviewed and agreed with or any disagreements were addressed in the HPI. REVIEW OF SYSTEMS :      Review of Systems   Constitutional:  Negative for appetite change, chills and fever. HENT:  Negative for congestion and rhinorrhea.     Respiratory:  Negative for cough, shortness of breath and wheezing. Cardiovascular:  Positive for leg swelling. Negative for chest pain. Gastrointestinal:  Negative for abdominal pain, diarrhea, nausea and vomiting. Genitourinary:  Negative for difficulty urinating, dysuria and hematuria. Musculoskeletal:  Negative for neck pain and neck stiffness. Skin:  Negative for rash. Neurological:  Negative for weakness, numbness and headaches. Positives and Pertinent negatives as per HPI. SURGICAL HISTORY     Past Surgical History:   Procedure Laterality Date    APPENDECTOMY  12/14/2018     Laparoscopic appendectomy     CHOLECYSTECTOMY, LAPAROSCOPIC  2/07    LAPAROSCOPIC APPENDECTOMY N/A 12/14/2018    LAPAROSCOPIC APPENDECTOMY performed by Haleigh Malave MD at [facility]  07/18/2014    COLPOSCOPY OF VULVA, VAGINA AND CERVIX WITH CO-2 LASER       CURRENTMEDICATIONS       Previous Medications    AMLODIPINE (NORVASC) 5 MG TABLET    Take 1 tablet by mouth daily    BUPROPION (WELLBUTRIN XL) 150 MG EXTENDED RELEASE TABLET    Take 1 tablet by mouth every morning    BUSPIRONE (BUSPAR) 5 MG TABLET    Take 1 tablet by mouth 3 times daily as needed (prn tid for anxiety)    CONTINUOUS BLOOD GLUC  (DEXCOM G5  KIT) MARY    1 Act by Does not apply route continuous    CONTINUOUS BLOOD GLUC  (FREESTYLE SANDRA 14 DAY READER) MARY    USE AS DIRECTED DAILY    CONTINUOUS BLOOD GLUC SENSOR (FREESTYLE SANDRA 14 DAY SENSOR) MISC    CHANGE EVERY 14 DAYS    DULOXETINE HCL 40 MG CPEP    Take 40 mg by mouth daily    EXENATIDE (BYETTA 5 MCG PEN) 5 MCG/0.02ML INJECTION    Inject 0.02 mLs into the skin 2 times daily (with meals)    GABAPENTIN (NEURONTIN) 600 MG TABLET    Take 1 tablet by mouth 3 times daily for 120 days. INSULIN ASPART (NOVOLOG FLEXPEN) 100 UNIT/ML INJECTION PEN    25 units at breakfast; 25 at dinner.     INSULIN GLARGINE (LANTUS SOLOSTAR) 100 UNIT/ML INJECTION PEN    Inject 20 Units into the skin nightly    INSULIN PEN NEEDLE (PEN NEEDLES 3/16\") 31G X 5 MM MISC    1 each by Does not apply route daily    METOPROLOL SUCCINATE (TOPROL XL) 50 MG EXTENDED RELEASE TABLET    Take 1 tablet by mouth daily    OMEPRAZOLE (PRILOSEC) 40 MG DELAYED RELEASE CAPSULE    Take 1 capsule by mouth every morning (before breakfast)    TIZANIDINE (ZANAFLEX) 4 MG TABLET    TAKE ONE TABLET BY MOUTH TWICE A DAY AS NEEDED FOR MUSCLE SPASMS    VALSARTAN (DIOVAN) 80 MG TABLET    Take 1 tablet by mouth daily       ALLERGIES     Lisinopril and Augmentin [amoxicillin-pot clavulanate]    FAMILYHISTORY       Family History   Problem Relation Age of Onset    High Blood Pressure Mother     High Blood Pressure Father     Asthma Father     Cancer Maternal Grandmother         breast    Breast Cancer Maternal Grandmother     Diabetes Paternal Grandmother         SOCIAL HISTORY       Social History     Tobacco Use    Smoking status: Never    Smokeless tobacco: Never   Vaping Use    Vaping Use: Never used   Substance Use Topics    Alcohol use: No    Drug use: Not Currently     Types: Marijuana (Weed)       SCREENINGS        Falcon Coma Scale  Eye Opening: Spontaneous  Best Verbal Response: Oriented  Best Motor Response: Obeys commands  Mika Coma Scale Score: 15                CIWA Assessment  BP: (!) 199/118  Heart Rate: 97           PHYSICAL EXAM  1 or more Elements     ED Triage Vitals   BP Temp Temp Source Heart Rate Resp SpO2 Height Weight   02/22/23 1420 02/22/23 1421 02/22/23 1420 02/22/23 1420 02/22/23 1420 02/22/23 1420 -- --   (!) 199/118 98.2 °F (36.8 °C) Oral 97 16 100 %         Physical Exam  Vitals and nursing note reviewed. Constitutional:       Appearance: She is well-developed. She is not diaphoretic. HENT:      Head: Normocephalic and atraumatic. Right Ear: External ear normal.      Left Ear: External ear normal.      Nose: Nose normal.   Eyes:      General:         Right eye: No discharge.          Left eye: No discharge. Cardiovascular:      Rate and Rhythm: Normal rate and regular rhythm. Pulses: Normal pulses. Heart sounds: Normal heart sounds. Pulmonary:      Effort: Pulmonary effort is normal. No respiratory distress. Breath sounds: Normal breath sounds. Chest:      Chest wall: No tenderness. Abdominal:      Palpations: Abdomen is soft. Musculoskeletal:         General: Normal range of motion. Cervical back: Normal range of motion and neck supple. Left lower leg: Tenderness present. Skin:     General: Skin is warm and dry. Neurological:      Mental Status: She is alert and oriented to person, place, and time. Psychiatric:         Behavior: Behavior normal.         DIAGNOSTIC RESULTS   LABS:    Labs Reviewed   BASIC METABOLIC PANEL - Abnormal; Notable for the following components:       Result Value    Sodium 134 (*)     Glucose 362 (*)     All other components within normal limits   CBC WITH AUTO DIFFERENTIAL   BRAIN NATRIURETIC PEPTIDE       When ordered only abnormal lab results are displayed. All other labs were within normal range or not returned as of this dictation. EKG: When ordered, EKG's are interpreted by the Emergency Department Physician in the absence of a cardiologist.  Please see their note for interpretation of EKG. RADIOLOGY:   Non-plain film images such as CT, Ultrasound and MRI are read by the radiologist. Plain radiographic images are visualized and preliminarily interpreted by the ED Provider with the below findings:    Negative     Interpretation per the Radiologist below, if available at the time of this note:    VL Extremity Venous Left           No results found. No results found.     PROCEDURES   Unless otherwise noted below, none     Procedures    CRITICAL CARE TIME (.cctime)       PAST MEDICAL HISTORY      has a past medical history of Acute appendicitis (12/14/2018), Anxiety (12/22/2022), Appendicitis, acute (12/14/2018), Asthma, Hypertension, Miscarriage, Morbid obesity ([facility] [address] Type II or unspecified type diabetes mellitus without mention of complication, not stated as uncontrolled, and Unspecified noninflammatory disorder of vulva and perineum. EMERGENCY DEPARTMENT COURSE and DIFFERENTIAL DIAGNOSIS/MDM:   Vitals:    Vitals:    02/22/23 1420 02/22/23 1421   BP: (!) 199/118    Pulse: 97    Resp: 16    Temp:  98.2 °F (36.8 °C)   TempSrc: Oral    SpO2: 100%        Patient was given the following medications:  Medications - No data to display          Is this patient to be included in the SEP-1 Core Measure due to severe sepsis or septic shock? No   Exclusion criteria - the patient is NOT to be included for SEP-1 Core Measure due to: Infection is not suspected    Chronic Conditions affecting care:    has a past medical history of Acute appendicitis (12/14/2018), Anxiety (12/22/2022), Appendicitis, acute (12/14/2018), Asthma, Hypertension, Miscarriage, Morbid obesity ([facility] [address] Type II or unspecified type diabetes mellitus without mention of complication, not stated as uncontrolled, and Unspecified noninflammatory disorder of vulva and perineum. CONSULTS: (Who and What was discussed)  None      Social Determinants Significantly Affecting Health : None    Records Reviewed (External and Source) n/a    CC/HPI Summary, DDx, ED Course, and Reassessment: Patient presents for evaluation of left calf pain and bilateral foot swelling. On exam, she is resting comfortably in bed no acute distress and nontoxic. She is significantly hypertensive but vitals otherwise stable and she is afebrile. Lungs are clear to auscultation bilaterally, chest is nontender abdomen is benign. She does have mild symmetric edema to bilateral feet and ankles but there is tenderness to the left calf. Negative Homans' sign.     Disposition Considerations (tests considered but not done, Admit vs D/C, Shared Decision Making, Pt Expectation of Test or Tx.): Venous Dopplers unremarkable with no evidence of deep or superficial vein thrombosis of the left lower extremity or right common femoral vein. Symptomatic and supportive care discussed including compression and elevation to help with bilateral lower extremity edema. She was also encouraged follow-up with PCP regarding elevated blood pressure reading and blood pressure control. Patient's oxygen saturations are good. I do not believe the patient's symptoms today are due to pulmonary embolism, pulmonary edema, pneumonia, pneumothorax, ACS, CHF, status asthmaticus, acute respiratory failure, profound anemia or metabolic abnormality, amongst other more emergent diagnostic considerations. Patient was advised to immediately return to emergency department if symptoms worsen. She is agreeable to this plan and stable for discharge at this time. I am the Primary Clinician of Record. FINAL IMPRESSION      1. Elevated blood pressure reading    2.  Bilateral lower extremity edema          DISPOSITION/PLAN     DISPOSITION Decision To Discharge 02/22/2023 03:33:23 PM      PATIENT REFERRED TO:  Samantha Navarro MD  [address] [address]  [phone]    Call   For a re-check in  2-3   days    [facility] Emergency Department  [address] [place] [address]  [phone]  Go to   If symptoms worsen    DISCHARGE MEDICATIONS:  New Prescriptions    No medications on file       DISCONTINUED MEDICATIONS:  Discontinued Medications    No medications on file              (Please note that portions of this note were completed with a voice recognition program.  Efforts were made to edit the dictations but occasionally words are mis-transcribed.)    Jericho Parikh PA-C (electronically signed)            Kamilla Carcamo PA-C  02/22/23 9260

## 2023-03-18 DIAGNOSIS — E11.59 OBESITY, DIABETES, AND HYPERTENSION SYNDROME (HCC): ICD-10-CM

## 2023-03-18 DIAGNOSIS — F41.9 ANXIETY: ICD-10-CM

## 2023-03-18 DIAGNOSIS — I15.2 OBESITY, DIABETES, AND HYPERTENSION SYNDROME (HCC): ICD-10-CM

## 2023-03-18 DIAGNOSIS — E11.69 OBESITY, DIABETES, AND HYPERTENSION SYNDROME (HCC): ICD-10-CM

## 2023-03-18 DIAGNOSIS — E66.9 OBESITY, DIABETES, AND HYPERTENSION SYNDROME (HCC): ICD-10-CM

## 2023-03-20 RX ORDER — AMLODIPINE BESYLATE 5 MG/1
TABLET ORAL
Qty: 90 TABLET | Refills: 0 | OUTPATIENT
Start: 2023-03-20

## 2023-03-20 RX ORDER — BUSPIRONE HYDROCHLORIDE 5 MG/1
TABLET ORAL
Qty: 90 TABLET | Refills: 0 | OUTPATIENT
Start: 2023-03-20

## 2023-03-22 ENCOUNTER — HOSPITAL ENCOUNTER (EMERGENCY)
Age: 38
Discharge: HOME | End: 2023-03-22
Payer: MEDICAID

## 2023-03-22 ENCOUNTER — APPOINTMENT (OUTPATIENT)
Dept: GENERAL RADIOLOGY | Age: 38
End: 2023-03-22
Payer: MEDICAID

## 2023-03-22 VITALS
WEIGHT: 293 LBS | DIASTOLIC BLOOD PRESSURE: 130 MMHG | RESPIRATION RATE: 14 BRPM | OXYGEN SATURATION: 98 % | HEART RATE: 95 BPM | BODY MASS INDEX: 45.99 KG/M2 | HEIGHT: 67 IN | TEMPERATURE: 97.6 F | SYSTOLIC BLOOD PRESSURE: 186 MMHG

## 2023-03-22 DIAGNOSIS — R03.0 ELEVATED BLOOD PRESSURE READING: ICD-10-CM

## 2023-03-22 DIAGNOSIS — M54.12 CERVICAL RADICULOPATHY: Primary | ICD-10-CM

## 2023-03-22 LAB
ALBUMIN SERPL-MCNC: 3.8 G/DL (ref 3.4–5)
ALBUMIN/GLOB SERPL: 1.1 {RATIO} (ref 1.1–2.2)
ALP SERPL-CCNC: 108 U/L (ref 40–129)
ALT SERPL-CCNC: 10 U/L (ref 10–40)
ANION GAP SERPL CALCULATED.3IONS-SCNC: 16 MMOL/L (ref 3–16)
AST SERPL-CCNC: 15 U/L (ref 15–37)
BASOPHILS # BLD: 0.1 K/UL (ref 0–0.2)
BASOPHILS NFR BLD: 1.1 %
BILIRUB SERPL-MCNC: <0.2 MG/DL (ref 0–1)
BUN SERPL-MCNC: 14 MG/DL (ref 7–20)
CALCIUM SERPL-MCNC: 9.4 MG/DL (ref 8.3–10.6)
CHLORIDE SERPL-SCNC: 99 MMOL/L (ref 99–110)
CO2 SERPL-SCNC: 20 MMOL/L (ref 21–32)
CREAT SERPL-MCNC: 0.7 MG/DL (ref 0.6–1.1)
DEPRECATED RDW RBC AUTO: 13.7 % (ref 12.4–15.4)
EOSINOPHIL # BLD: 0.1 K/UL (ref 0–0.6)
EOSINOPHIL NFR BLD: 1.4 %
GFR SERPLBLD CREATININE-BSD FMLA CKD-EPI: >60 ML/MIN/{1.73_M2}
GLUCOSE BLD-MCNC: 323 MG/DL (ref 70–99)
GLUCOSE SERPL-MCNC: 366 MG/DL (ref 70–99)
HCT VFR BLD AUTO: 41.4 % (ref 36–48)
HGB BLD-MCNC: 13.9 G/DL (ref 12–16)
LYMPHOCYTES # BLD: 3 K/UL (ref 1–5.1)
LYMPHOCYTES NFR BLD: 51.9 %
MCH RBC QN AUTO: 28.6 PG (ref 26–34)
MCHC RBC AUTO-ENTMCNC: 33.5 G/DL (ref 31–36)
MCV RBC AUTO: 85.4 FL (ref 80–100)
MONOCYTES # BLD: 0.4 K/UL (ref 0–1.3)
MONOCYTES NFR BLD: 6.7 %
NEUTROPHILS # BLD: 2.2 K/UL (ref 1.7–7.7)
NEUTROPHILS NFR BLD: 38.9 %
PERFORMED ON: ABNORMAL
PLATELET # BLD AUTO: 352 K/UL (ref 135–450)
PLATELET BLD QL SMEAR: ADEQUATE
PMV BLD AUTO: 8.2 FL (ref 5–10.5)
POTASSIUM SERPL-SCNC: 4.2 MMOL/L (ref 3.5–5.1)
PROT SERPL-MCNC: 7.4 G/DL (ref 6.4–8.2)
RBC # BLD AUTO: 4.85 M/UL (ref 4–5.2)
SLIDE REVIEW: NORMAL
SODIUM SERPL-SCNC: 135 MMOL/L (ref 136–145)
TROPONIN T SERPL-MCNC: <0.01 NG/ML
TSH SERPL DL<=0.005 MIU/L-ACNC: 1.21 UIU/ML (ref 0.27–4.2)
WBC # BLD AUTO: 5.7 K/UL (ref 4–11)

## 2023-03-22 PROCEDURE — 71046 X-RAY EXAM CHEST 2 VIEWS: CPT

## 2023-03-22 PROCEDURE — 36415 COLL VENOUS BLD VENIPUNCTURE: CPT

## 2023-03-22 PROCEDURE — 85025 COMPLETE CBC W/AUTO DIFF WBC: CPT

## 2023-03-22 PROCEDURE — 80053 COMPREHEN METABOLIC PANEL: CPT

## 2023-03-22 PROCEDURE — 84484 ASSAY OF TROPONIN QUANT: CPT

## 2023-03-22 PROCEDURE — 6370000000 HC RX 637 (ALT 250 FOR IP): Performed by: PHYSICIAN ASSISTANT

## 2023-03-22 PROCEDURE — 84443 ASSAY THYROID STIM HORMONE: CPT

## 2023-03-22 PROCEDURE — 93005 ELECTROCARDIOGRAM TRACING: CPT | Performed by: PHYSICIAN ASSISTANT

## 2023-03-22 PROCEDURE — 99285 EMERGENCY DEPT VISIT HI MDM: CPT

## 2023-03-22 RX ORDER — PREDNISONE 20 MG/1
60 TABLET ORAL ONCE
Status: COMPLETED | OUTPATIENT
Start: 2023-03-22 | End: 2023-03-22

## 2023-03-22 RX ORDER — CYCLOBENZAPRINE HCL 10 MG
10 TABLET ORAL ONCE
Status: COMPLETED | OUTPATIENT
Start: 2023-03-22 | End: 2023-03-22

## 2023-03-22 RX ORDER — CYCLOBENZAPRINE HCL 10 MG
10 TABLET ORAL 3 TIMES DAILY PRN
Qty: 20 TABLET | Refills: 0 | Status: SHIPPED | OUTPATIENT
Start: 2023-03-22

## 2023-03-22 RX ORDER — NAPROXEN 500 MG/1
500 TABLET ORAL 2 TIMES DAILY PRN
Qty: 30 TABLET | Refills: 0 | Status: SHIPPED | OUTPATIENT
Start: 2023-03-22

## 2023-03-22 RX ORDER — HYDROCODONE BITARTRATE AND ACETAMINOPHEN 5; 325 MG/1; MG/1
1 TABLET ORAL EVERY 6 HOURS PRN
Qty: 10 TABLET | Refills: 0 | Status: SHIPPED | OUTPATIENT
Start: 2023-03-22 | End: 2023-03-25

## 2023-03-22 RX ORDER — HYDROCODONE BITARTRATE AND ACETAMINOPHEN 5; 325 MG/1; MG/1
1 TABLET ORAL ONCE
Status: COMPLETED | OUTPATIENT
Start: 2023-03-22 | End: 2023-03-22

## 2023-03-22 RX ADMIN — PREDNISONE 60 MG: 20 TABLET ORAL at 17:46

## 2023-03-22 RX ADMIN — HYDROCODONE BITARTRATE AND ACETAMINOPHEN 1 TABLET: 5; 325 TABLET ORAL at 17:46

## 2023-03-22 RX ADMIN — CYCLOBENZAPRINE 10 MG: 10 TABLET, FILM COATED ORAL at 17:45

## 2023-03-22 ASSESSMENT — PAIN DESCRIPTION - PAIN TYPE: TYPE: ACUTE PAIN

## 2023-03-22 ASSESSMENT — PAIN DESCRIPTION - DESCRIPTORS: DESCRIPTORS: SHOOTING;SHARP

## 2023-03-22 ASSESSMENT — PAIN - FUNCTIONAL ASSESSMENT: PAIN_FUNCTIONAL_ASSESSMENT: 0-10

## 2023-03-22 ASSESSMENT — PAIN DESCRIPTION - LOCATION: LOCATION: NECK

## 2023-03-22 ASSESSMENT — PAIN DESCRIPTION - FREQUENCY: FREQUENCY: CONTINUOUS

## 2023-03-22 ASSESSMENT — PAIN SCALES - GENERAL
PAINLEVEL_OUTOF10: 7
PAINLEVEL_OUTOF10: 2

## 2023-03-22 NOTE — ED NOTES
Arrived via self c/o left arm pain numbness for three weeks concerned for pulled muscle. Denies any other complaints. A/ox3, ambulated with ease. No acute distress noted.       Marcos Del Rosario RN  03/22/23 6922

## 2023-03-22 NOTE — ED PROVIDER NOTES
[id number]  [phone]    Schedule an appointment as soon as possible for a visit   for reevaluation and to recheck your blood pressure    [facility] Emergency Department  [address] [facility]  [phone]    As needed, If symptoms worsen, for worsening pain, or if numbness tingling loss of bowel or bladder control or unable to urinate or for any other concerns    DISCHARGE MEDICATIONS:  New Prescriptions    CYCLOBENZAPRINE (FLEXERIL) 10 MG TABLET    Take 1 tablet by mouth 3 times daily as needed for Muscle spasms    HYDROCODONE-ACETAMINOPHEN (NORCO) 5-325 MG PER TABLET    Take 1 tablet by mouth every 6 hours as needed for Pain for up to 3 days.     NAPROXEN (NAPROSYN) 500 MG TABLET    Take 1 tablet by mouth 2 times daily as needed for Pain       DISCONTINUED MEDICATIONS:  Discontinued Medications    TIZANIDINE (ZANAFLEX) 4 MG TABLET    TAKE ONE TABLET BY MOUTH TWICE A DAY AS NEEDED FOR MUSCLE SPASMS              (Please note that portions of this note were completed with a voice recognition program.  Efforts were made to edit the dictations but occasionally words are mis-transcribed.)    PEPE Garza (electronically signed)           Edgar Garza  03/22/23 1772

## 2023-03-23 LAB
EKG ATRIAL RATE: 90 BPM
EKG DIAGNOSIS: NORMAL
EKG P AXIS: 40 DEGREES
EKG P-R INTERVAL: 160 MS
EKG Q-T INTERVAL: 370 MS
EKG QRS DURATION: 78 MS
EKG QTC CALCULATION (BAZETT): 452 MS
EKG R AXIS: 34 DEGREES
EKG T AXIS: 11 DEGREES
EKG VENTRICULAR RATE: 90 BPM

## 2023-03-23 PROCEDURE — 93010 ELECTROCARDIOGRAM REPORT: CPT | Performed by: INTERNAL MEDICINE

## 2023-03-23 NOTE — ED NOTES
EKG My Reading:  Rate: 90  Rhythm: sinus  ST Segments: T wave inversion in lead III only, some areas of T wave flattening are present  STEMI: no  QTc: 425 (normal for female)  Comparison to prior: T wave morphology not substantially changed compared to 11/2021       Vish Malik MD  03/22/23 2036

## 2023-03-25 DIAGNOSIS — I15.2 OBESITY, DIABETES, AND HYPERTENSION SYNDROME (HCC): ICD-10-CM

## 2023-03-25 DIAGNOSIS — E11.69 OBESITY, DIABETES, AND HYPERTENSION SYNDROME (HCC): ICD-10-CM

## 2023-03-25 DIAGNOSIS — E11.59 OBESITY, DIABETES, AND HYPERTENSION SYNDROME (HCC): ICD-10-CM

## 2023-03-25 DIAGNOSIS — E66.9 OBESITY, DIABETES, AND HYPERTENSION SYNDROME (HCC): ICD-10-CM

## 2023-03-25 DIAGNOSIS — G62.9 NEUROPATHY: ICD-10-CM

## 2023-03-27 RX ORDER — DULOXETINE 40 MG/1
CAPSULE, DELAYED RELEASE ORAL
Qty: 30 CAPSULE | Refills: 0 | OUTPATIENT
Start: 2023-03-27

## 2023-03-27 NOTE — TELEPHONE ENCOUNTER
Last OV: 12/29/2022  Next OV: Visit date not found  around 2/9/2023).     Last fill:2/21/23  Refills:0

## 2023-04-18 DIAGNOSIS — I15.2 OBESITY, DIABETES, AND HYPERTENSION SYNDROME (HCC): ICD-10-CM

## 2023-04-18 DIAGNOSIS — E66.9 OBESITY, DIABETES, AND HYPERTENSION SYNDROME (HCC): ICD-10-CM

## 2023-04-18 DIAGNOSIS — E11.69 OBESITY, DIABETES, AND HYPERTENSION SYNDROME (HCC): ICD-10-CM

## 2023-04-18 DIAGNOSIS — E11.59 OBESITY, DIABETES, AND HYPERTENSION SYNDROME (HCC): ICD-10-CM

## 2023-04-18 DIAGNOSIS — G62.9 NEUROPATHY: ICD-10-CM

## 2023-04-18 DIAGNOSIS — F41.9 ANXIETY: ICD-10-CM

## 2023-04-18 RX ORDER — DULOXETINE 40 MG/1
CAPSULE, DELAYED RELEASE ORAL
Qty: 30 CAPSULE | Refills: 0 | OUTPATIENT
Start: 2023-04-18

## 2023-04-18 RX ORDER — BUSPIRONE HYDROCHLORIDE 5 MG/1
TABLET ORAL
Qty: 90 TABLET | Refills: 0 | OUTPATIENT
Start: 2023-04-18

## 2023-04-24 DIAGNOSIS — I15.2 OBESITY, DIABETES, AND HYPERTENSION SYNDROME (HCC): ICD-10-CM

## 2023-04-24 DIAGNOSIS — E11.69 OBESITY, DIABETES, AND HYPERTENSION SYNDROME (HCC): ICD-10-CM

## 2023-04-24 DIAGNOSIS — F41.9 ANXIETY: ICD-10-CM

## 2023-04-24 DIAGNOSIS — E11.59 OBESITY, DIABETES, AND HYPERTENSION SYNDROME (HCC): ICD-10-CM

## 2023-04-24 DIAGNOSIS — M54.50 CHRONIC LOW BACK PAIN, UNSPECIFIED BACK PAIN LATERALITY, UNSPECIFIED WHETHER SCIATICA PRESENT: ICD-10-CM

## 2023-04-24 DIAGNOSIS — G89.29 CHRONIC LOW BACK PAIN, UNSPECIFIED BACK PAIN LATERALITY, UNSPECIFIED WHETHER SCIATICA PRESENT: ICD-10-CM

## 2023-04-24 DIAGNOSIS — G62.9 NEUROPATHY: ICD-10-CM

## 2023-04-24 DIAGNOSIS — E66.9 OBESITY, DIABETES, AND HYPERTENSION SYNDROME (HCC): ICD-10-CM

## 2023-04-25 RX ORDER — DULOXETINE 40 MG/1
CAPSULE, DELAYED RELEASE ORAL
Qty: 30 CAPSULE | Refills: 0 | OUTPATIENT
Start: 2023-04-25

## 2023-04-25 RX ORDER — BUSPIRONE HYDROCHLORIDE 5 MG/1
TABLET ORAL
Qty: 90 TABLET | Refills: 0 | OUTPATIENT
Start: 2023-04-25

## 2023-04-25 RX ORDER — TIZANIDINE 4 MG/1
TABLET ORAL
Qty: 60 TABLET | Refills: 3 | OUTPATIENT
Start: 2023-04-25

## 2023-04-25 NOTE — TELEPHONE ENCOUNTER
Last OV: 12/29/2022  Next OV: Visit date not found    Next appointment due:around 2/9/2023    Last fill:8/30/22  Refills:3

## 2023-05-08 DIAGNOSIS — G62.9 NEUROPATHY: ICD-10-CM

## 2023-05-08 DIAGNOSIS — E11.59 OBESITY, DIABETES, AND HYPERTENSION SYNDROME (HCC): ICD-10-CM

## 2023-05-08 DIAGNOSIS — F41.9 ANXIETY: ICD-10-CM

## 2023-05-08 DIAGNOSIS — G89.29 CHRONIC LOW BACK PAIN, UNSPECIFIED BACK PAIN LATERALITY, UNSPECIFIED WHETHER SCIATICA PRESENT: ICD-10-CM

## 2023-05-08 DIAGNOSIS — E11.69 OBESITY, DIABETES, AND HYPERTENSION SYNDROME (HCC): ICD-10-CM

## 2023-05-08 DIAGNOSIS — E66.9 OBESITY, DIABETES, AND HYPERTENSION SYNDROME (HCC): ICD-10-CM

## 2023-05-08 DIAGNOSIS — I15.2 OBESITY, DIABETES, AND HYPERTENSION SYNDROME (HCC): ICD-10-CM

## 2023-05-08 DIAGNOSIS — M54.50 CHRONIC LOW BACK PAIN, UNSPECIFIED BACK PAIN LATERALITY, UNSPECIFIED WHETHER SCIATICA PRESENT: ICD-10-CM

## 2023-05-08 RX ORDER — TIZANIDINE 4 MG/1
TABLET ORAL
COMMUNITY
Start: 2023-03-30

## 2023-05-08 RX ORDER — BUSPIRONE HYDROCHLORIDE 5 MG/1
TABLET ORAL
Qty: 90 TABLET | Refills: 0 | OUTPATIENT
Start: 2023-05-08

## 2023-05-08 RX ORDER — DULOXETINE 40 MG/1
CAPSULE, DELAYED RELEASE ORAL
Qty: 30 CAPSULE | Refills: 0 | OUTPATIENT
Start: 2023-05-08

## 2023-05-08 RX ORDER — TIZANIDINE 4 MG/1
TABLET ORAL
Qty: 60 TABLET | Refills: 3 | OUTPATIENT
Start: 2023-05-08

## 2023-05-08 NOTE — TELEPHONE ENCOUNTER
Last OV: 12/29/2022  Next OV: Visit date not found    Next appointment due:around 2/9/2023    Last fill:8/30/22  Refills:2

## 2023-05-13 ENCOUNTER — APPOINTMENT (OUTPATIENT)
Dept: GENERAL RADIOLOGY | Age: 38
End: 2023-05-13
Payer: MEDICAID

## 2023-05-13 ENCOUNTER — HOSPITAL ENCOUNTER (EMERGENCY)
Age: 38
Discharge: HOME | End: 2023-05-13
Attending: EMERGENCY MEDICINE
Payer: MEDICAID

## 2023-05-13 VITALS
SYSTOLIC BLOOD PRESSURE: 156 MMHG | OXYGEN SATURATION: 98 % | RESPIRATION RATE: 18 BRPM | HEIGHT: 67 IN | WEIGHT: 293 LBS | BODY MASS INDEX: 45.99 KG/M2 | DIASTOLIC BLOOD PRESSURE: 91 MMHG | TEMPERATURE: 98.1 F | HEART RATE: 98 BPM

## 2023-05-13 DIAGNOSIS — I15.2 OBESITY, DIABETES, AND HYPERTENSION SYNDROME (HCC): ICD-10-CM

## 2023-05-13 DIAGNOSIS — E11.69 OBESITY, DIABETES, AND HYPERTENSION SYNDROME (HCC): ICD-10-CM

## 2023-05-13 DIAGNOSIS — E11.59 OBESITY, DIABETES, AND HYPERTENSION SYNDROME (HCC): ICD-10-CM

## 2023-05-13 DIAGNOSIS — J45.901 ASTHMA WITH SEVERE EXACERBATION: Primary | ICD-10-CM

## 2023-05-13 DIAGNOSIS — E66.9 OBESITY, DIABETES, AND HYPERTENSION SYNDROME (HCC): ICD-10-CM

## 2023-05-13 LAB
EKG ATRIAL RATE: 90 BPM
EKG DIAGNOSIS: NORMAL
EKG P AXIS: 18 DEGREES
EKG P-R INTERVAL: 162 MS
EKG Q-T INTERVAL: 370 MS
EKG QRS DURATION: 68 MS
EKG QTC CALCULATION (BAZETT): 452 MS
EKG R AXIS: 24 DEGREES
EKG T AXIS: 4 DEGREES
EKG VENTRICULAR RATE: 90 BPM
HCG UR QL: NEGATIVE

## 2023-05-13 PROCEDURE — 99285 EMERGENCY DEPT VISIT HI MDM: CPT

## 2023-05-13 PROCEDURE — 94640 AIRWAY INHALATION TREATMENT: CPT

## 2023-05-13 PROCEDURE — 94760 N-INVAS EAR/PLS OXIMETRY 1: CPT

## 2023-05-13 PROCEDURE — 71046 X-RAY EXAM CHEST 2 VIEWS: CPT

## 2023-05-13 PROCEDURE — 93005 ELECTROCARDIOGRAM TRACING: CPT | Performed by: EMERGENCY MEDICINE

## 2023-05-13 PROCEDURE — 6370000000 HC RX 637 (ALT 250 FOR IP): Performed by: EMERGENCY MEDICINE

## 2023-05-13 PROCEDURE — 6360000002 HC RX W HCPCS: Performed by: EMERGENCY MEDICINE

## 2023-05-13 PROCEDURE — 84703 CHORIONIC GONADOTROPIN ASSAY: CPT

## 2023-05-13 PROCEDURE — 93010 ELECTROCARDIOGRAM REPORT: CPT | Performed by: INTERNAL MEDICINE

## 2023-05-13 RX ORDER — IPRATROPIUM BROMIDE AND ALBUTEROL SULFATE 2.5; .5 MG/3ML; MG/3ML
1 SOLUTION RESPIRATORY (INHALATION)
Status: DISCONTINUED | OUTPATIENT
Start: 2023-05-13 | End: 2023-05-13 | Stop reason: HOSPADM

## 2023-05-13 RX ORDER — AMLODIPINE BESYLATE 5 MG/1
5 TABLET ORAL ONCE
Status: COMPLETED | OUTPATIENT
Start: 2023-05-13 | End: 2023-05-13

## 2023-05-13 RX ORDER — PREDNISONE 50 MG/1
50 TABLET ORAL DAILY
Qty: 5 TABLET | Refills: 0 | Status: SHIPPED | OUTPATIENT
Start: 2023-05-13 | End: 2023-05-18

## 2023-05-13 RX ORDER — PREDNISONE 20 MG/1
60 TABLET ORAL ONCE
Status: COMPLETED | OUTPATIENT
Start: 2023-05-13 | End: 2023-05-13

## 2023-05-13 RX ORDER — ALBUTEROL SULFATE 90 UG/1
2 AEROSOL, METERED RESPIRATORY (INHALATION) 4 TIMES DAILY PRN
Qty: 18 G | Refills: 0 | Status: SHIPPED | OUTPATIENT
Start: 2023-05-13

## 2023-05-13 RX ORDER — AMLODIPINE BESYLATE 5 MG/1
5 TABLET ORAL DAILY
Qty: 90 TABLET | Refills: 0 | Status: SHIPPED | OUTPATIENT
Start: 2023-05-13 | End: 2023-08-11

## 2023-05-13 RX ORDER — AMLODIPINE BESYLATE 5 MG/1
5 TABLET ORAL DAILY
Status: DISCONTINUED | OUTPATIENT
Start: 2023-05-13 | End: 2023-05-13

## 2023-05-13 RX ADMIN — AMLODIPINE BESYLATE 5 MG: 5 TABLET ORAL at 10:57

## 2023-05-13 RX ADMIN — ALBUTEROL SULFATE 2.5 MG: 2.5 SOLUTION RESPIRATORY (INHALATION) at 10:31

## 2023-05-13 RX ADMIN — PREDNISONE 60 MG: 20 TABLET ORAL at 10:57

## 2023-05-13 RX ADMIN — IPRATROPIUM BROMIDE AND ALBUTEROL SULFATE 1 AMPULE: 2.5; .5 SOLUTION RESPIRATORY (INHALATION) at 10:24

## 2023-05-13 ASSESSMENT — PAIN DESCRIPTION - LOCATION: LOCATION: CHEST

## 2023-05-13 ASSESSMENT — PAIN - FUNCTIONAL ASSESSMENT: PAIN_FUNCTIONAL_ASSESSMENT: 0-10

## 2023-05-13 ASSESSMENT — PAIN SCALES - GENERAL: PAINLEVEL_OUTOF10: 4

## 2023-05-13 ASSESSMENT — PAIN DESCRIPTION - DESCRIPTORS: DESCRIPTORS: TIGHTNESS

## 2023-05-13 ASSESSMENT — PAIN DESCRIPTION - FREQUENCY: FREQUENCY: CONTINUOUS

## 2023-05-13 ASSESSMENT — PAIN DESCRIPTION - PAIN TYPE: TYPE: ACUTE PAIN

## 2023-05-13 NOTE — ED PROVIDER NOTES
Spacer/Aero-Holding Chambers MARY DAILY PRN Starting Sat 5/13/2023, Disp-1 each, R-0, Normal      predniSONE (DELTASONE) 50 MG tablet Take 1 tablet by mouth daily for 5 days, Disp-5 tablet, R-0Normal             Patient instructed to follow up with:   Your PCP    In 2 days      [facility] Emergency Department  [address] [facility]  [phone]    As needed, if symptoms worsen      All questions were answered and the patient/family expressed understanding and agreement with the plan. PROCEDURES  None    CRITICAL CARE  N/A    CLINICAL IMPRESSION  1. Asthma with severe exacerbation    2. Obesity, diabetes, and hypertension syndrome ([facility] [address]        DISPOSITION  Decision To Discharge 05/13/2023 11:24:51 AM     Mel Lilly MD    Note: This chart was created using voice recognition dictation software. Efforts were made by me to ensure accuracy, however some errors may be present due to limitations of this technology and occasionally words are not transcribed correctly.         Mel Lilly MD  05/13/23 3133

## 2023-05-13 NOTE — ED TRIAGE NOTES
Pt with SOB x3 days. Hx of asthma. States coughing, runny nose. Denies N&V, diarrhea, or fever. Pt with audible wheezing in triage. No distress noted.

## 2024-04-07 ENCOUNTER — APPOINTMENT (OUTPATIENT)
Dept: GENERAL RADIOLOGY | Age: 39
DRG: 141 | End: 2024-04-07
Payer: MEDICAID

## 2024-04-07 ENCOUNTER — HOSPITAL ENCOUNTER (INPATIENT)
Age: 39
LOS: 3 days | Discharge: HOME | DRG: 141 | End: 2024-04-10
Attending: EMERGENCY MEDICINE | Admitting: HOSPITALIST
Payer: MEDICAID

## 2024-04-07 DIAGNOSIS — R79.89 ELEVATED TROPONIN: ICD-10-CM

## 2024-04-07 DIAGNOSIS — R07.9 CHEST PAIN, UNSPECIFIED TYPE: ICD-10-CM

## 2024-04-07 DIAGNOSIS — J20.9 ACUTE BRONCHITIS WITH BRONCHOSPASM: ICD-10-CM

## 2024-04-07 DIAGNOSIS — J45.21 MILD INTERMITTENT ASTHMA WITH ACUTE EXACERBATION: Primary | ICD-10-CM

## 2024-04-07 PROBLEM — J45.901 ACUTE ASTHMA EXACERBATION: Status: ACTIVE | Noted: 2024-04-07

## 2024-04-07 LAB
ALBUMIN SERPL-MCNC: 4.1 G/DL (ref 3.4–5)
ALBUMIN/GLOB SERPL: 1.2 {RATIO} (ref 1.1–2.2)
ALP SERPL-CCNC: 126 U/L (ref 40–129)
ALT SERPL-CCNC: 13 U/L (ref 10–40)
ANION GAP SERPL CALCULATED.3IONS-SCNC: 13 MMOL/L (ref 3–16)
AST SERPL-CCNC: 15 U/L (ref 15–37)
BASOPHILS # BLD: 0.1 K/UL (ref 0–0.2)
BASOPHILS NFR BLD: 1.1 %
BILIRUB SERPL-MCNC: <0.2 MG/DL (ref 0–1)
BUN SERPL-MCNC: 32 MG/DL (ref 7–20)
CALCIUM SERPL-MCNC: 9.9 MG/DL (ref 8.3–10.6)
CHLORIDE SERPL-SCNC: 100 MMOL/L (ref 99–110)
CHOLEST SERPL-MCNC: 161 MG/DL (ref 0–199)
CO2 SERPL-SCNC: 25 MMOL/L (ref 21–32)
CREAT SERPL-MCNC: 1.2 MG/DL (ref 0.6–1.1)
D DIMER: 0.35 UG/ML FEU (ref 0–0.6)
DEPRECATED RDW RBC AUTO: 14.1 % (ref 12.4–15.4)
EOSINOPHIL # BLD: 0.5 K/UL (ref 0–0.6)
EOSINOPHIL NFR BLD: 7.7 %
FLUAV RNA UPPER RESP QL NAA+PROBE: NEGATIVE
FLUBV AG NPH QL: NEGATIVE
GFR SERPLBLD CREATININE-BSD FMLA CKD-EPI: 59 ML/MIN/{1.73_M2}
GLUCOSE BLD-MCNC: 381 MG/DL (ref 70–99)
GLUCOSE BLD-MCNC: 455 MG/DL (ref 70–99)
GLUCOSE SERPL-MCNC: 195 MG/DL (ref 70–99)
HCG SERPL QL: NEGATIVE
HCT VFR BLD AUTO: 39.7 % (ref 36–48)
HDLC SERPL-MCNC: 55 MG/DL (ref 40–60)
HGB BLD-MCNC: 13.1 G/DL (ref 12–16)
LDLC SERPL CALC-MCNC: 78 MG/DL
LYMPHOCYTES # BLD: 1.8 K/UL (ref 1–5.1)
LYMPHOCYTES NFR BLD: 29.4 %
MCH RBC QN AUTO: 28 PG (ref 26–34)
MCHC RBC AUTO-ENTMCNC: 33.1 G/DL (ref 31–36)
MCV RBC AUTO: 84.5 FL (ref 80–100)
MONOCYTES # BLD: 0.7 K/UL (ref 0–1.3)
MONOCYTES NFR BLD: 11.7 %
NEUTROPHILS # BLD: 3 K/UL (ref 1.7–7.7)
NEUTROPHILS NFR BLD: 50.1 %
NT-PROBNP SERPL-MCNC: 116 PG/ML (ref 0–124)
PERFORMED ON: ABNORMAL
PERFORMED ON: ABNORMAL
PLATELET # BLD AUTO: 366 K/UL (ref 135–450)
PMV BLD AUTO: 8.1 FL (ref 5–10.5)
POTASSIUM SERPL-SCNC: 4.8 MMOL/L (ref 3.5–5.1)
PROT SERPL-MCNC: 7.6 G/DL (ref 6.4–8.2)
RBC # BLD AUTO: 4.7 M/UL (ref 4–5.2)
SARS-COV-2 RDRP RESP QL NAA+PROBE: NOT DETECTED
SODIUM SERPL-SCNC: 138 MMOL/L (ref 136–145)
TRIGL SERPL-MCNC: 139 MG/DL (ref 0–150)
TROPONIN, HIGH SENSITIVITY: 12 NG/L (ref 0–14)
TROPONIN, HIGH SENSITIVITY: 12 NG/L (ref 0–14)
TROPONIN, HIGH SENSITIVITY: 15 NG/L (ref 0–14)
TROPONIN, HIGH SENSITIVITY: 16 NG/L (ref 0–14)
VLDLC SERPL CALC-MCNC: 28 MG/DL
WBC # BLD AUTO: 6 K/UL (ref 4–11)

## 2024-04-07 PROCEDURE — 87804 INFLUENZA ASSAY W/OPTIC: CPT

## 2024-04-07 PROCEDURE — 84484 ASSAY OF TROPONIN QUANT: CPT

## 2024-04-07 PROCEDURE — 6370000000 HC RX 637 (ALT 250 FOR IP): Performed by: EMERGENCY MEDICINE

## 2024-04-07 PROCEDURE — 6370000000 HC RX 637 (ALT 250 FOR IP): Performed by: HOSPITALIST

## 2024-04-07 PROCEDURE — 83880 ASSAY OF NATRIURETIC PEPTIDE: CPT

## 2024-04-07 PROCEDURE — 6360000002 HC RX W HCPCS: Performed by: HOSPITALIST

## 2024-04-07 PROCEDURE — 94760 N-INVAS EAR/PLS OXIMETRY 1: CPT

## 2024-04-07 PROCEDURE — 96374 THER/PROPH/DIAG INJ IV PUSH: CPT

## 2024-04-07 PROCEDURE — 94640 AIRWAY INHALATION TREATMENT: CPT

## 2024-04-07 PROCEDURE — 71046 X-RAY EXAM CHEST 2 VIEWS: CPT

## 2024-04-07 PROCEDURE — 99285 EMERGENCY DEPT VISIT HI MDM: CPT

## 2024-04-07 PROCEDURE — 2580000003 HC RX 258: Performed by: HOSPITALIST

## 2024-04-07 PROCEDURE — 1200000000 HC SEMI PRIVATE

## 2024-04-07 PROCEDURE — 6360000002 HC RX W HCPCS: Performed by: EMERGENCY MEDICINE

## 2024-04-07 PROCEDURE — 2500000003 HC RX 250 WO HCPCS: Performed by: HOSPITALIST

## 2024-04-07 PROCEDURE — 80053 COMPREHEN METABOLIC PANEL: CPT

## 2024-04-07 PROCEDURE — 85379 FIBRIN DEGRADATION QUANT: CPT

## 2024-04-07 PROCEDURE — 93005 ELECTROCARDIOGRAM TRACING: CPT | Performed by: EMERGENCY MEDICINE

## 2024-04-07 PROCEDURE — 6370000000 HC RX 637 (ALT 250 FOR IP): Performed by: NURSE PRACTITIONER

## 2024-04-07 PROCEDURE — 36415 COLL VENOUS BLD VENIPUNCTURE: CPT

## 2024-04-07 PROCEDURE — 84703 CHORIONIC GONADOTROPIN ASSAY: CPT

## 2024-04-07 PROCEDURE — 87635 SARS-COV-2 COVID-19 AMP PRB: CPT

## 2024-04-07 PROCEDURE — 85025 COMPLETE CBC W/AUTO DIFF WBC: CPT

## 2024-04-07 PROCEDURE — 80061 LIPID PANEL: CPT

## 2024-04-07 RX ORDER — GABAPENTIN 400 MG/1
800 CAPSULE ORAL 3 TIMES DAILY
Status: DISCONTINUED | OUTPATIENT
Start: 2024-04-07 | End: 2024-04-10 | Stop reason: HOSPADM

## 2024-04-07 RX ORDER — ENOXAPARIN SODIUM 100 MG/ML
40 INJECTION SUBCUTANEOUS DAILY
Status: DISCONTINUED | OUTPATIENT
Start: 2024-04-07 | End: 2024-04-07

## 2024-04-07 RX ORDER — INSULIN LISPRO 100 [IU]/ML
0-4 INJECTION, SOLUTION INTRAVENOUS; SUBCUTANEOUS NIGHTLY
Status: DISCONTINUED | OUTPATIENT
Start: 2024-04-07 | End: 2024-04-10 | Stop reason: HOSPADM

## 2024-04-07 RX ORDER — BUPROPION HYDROCHLORIDE 150 MG/1
300 TABLET ORAL DAILY
Status: DISCONTINUED | OUTPATIENT
Start: 2024-04-07 | End: 2024-04-10 | Stop reason: HOSPADM

## 2024-04-07 RX ORDER — ALBUTEROL SULFATE 2.5 MG/3ML
2.5 SOLUTION RESPIRATORY (INHALATION) EVERY 4 HOURS PRN
Status: DISCONTINUED | OUTPATIENT
Start: 2024-04-07 | End: 2024-04-07

## 2024-04-07 RX ORDER — METHYLPREDNISOLONE SODIUM SUCCINATE 125 MG/2ML
125 INJECTION, POWDER, LYOPHILIZED, FOR SOLUTION INTRAMUSCULAR; INTRAVENOUS ONCE
Status: COMPLETED | OUTPATIENT
Start: 2024-04-07 | End: 2024-04-07

## 2024-04-07 RX ORDER — VALSARTAN AND HYDROCHLOROTHIAZIDE 160; 25 MG/1; MG/1
1 TABLET ORAL DAILY
Status: DISCONTINUED | OUTPATIENT
Start: 2024-04-07 | End: 2024-04-10 | Stop reason: HOSPADM

## 2024-04-07 RX ORDER — ALBUTEROL SULFATE 2.5 MG/3ML
2.5 SOLUTION RESPIRATORY (INHALATION)
Status: DISCONTINUED | OUTPATIENT
Start: 2024-04-07 | End: 2024-04-07

## 2024-04-07 RX ORDER — VALSARTAN 80 MG/1
80 TABLET ORAL DAILY
Status: DISCONTINUED | OUTPATIENT
Start: 2024-04-07 | End: 2024-04-07

## 2024-04-07 RX ORDER — INSULIN LISPRO 100 [IU]/ML
0-16 INJECTION, SOLUTION INTRAVENOUS; SUBCUTANEOUS
Status: DISCONTINUED | OUTPATIENT
Start: 2024-04-07 | End: 2024-04-10 | Stop reason: HOSPADM

## 2024-04-07 RX ORDER — DEXTROSE MONOHYDRATE 100 MG/ML
INJECTION, SOLUTION INTRAVENOUS CONTINUOUS PRN
Status: DISCONTINUED | OUTPATIENT
Start: 2024-04-07 | End: 2024-04-10 | Stop reason: HOSPADM

## 2024-04-07 RX ORDER — IPRATROPIUM BROMIDE AND ALBUTEROL SULFATE 2.5; .5 MG/3ML; MG/3ML
1 SOLUTION RESPIRATORY (INHALATION) ONCE
Status: COMPLETED | OUTPATIENT
Start: 2024-04-07 | End: 2024-04-07

## 2024-04-07 RX ORDER — IPRATROPIUM BROMIDE AND ALBUTEROL SULFATE 2.5; .5 MG/3ML; MG/3ML
1 SOLUTION RESPIRATORY (INHALATION) EVERY 4 HOURS PRN
Status: DISCONTINUED | OUTPATIENT
Start: 2024-04-07 | End: 2024-04-10 | Stop reason: HOSPADM

## 2024-04-07 RX ORDER — SODIUM CHLORIDE 9 MG/ML
INJECTION, SOLUTION INTRAVENOUS PRN
Status: DISCONTINUED | OUTPATIENT
Start: 2024-04-07 | End: 2024-04-10 | Stop reason: HOSPADM

## 2024-04-07 RX ORDER — PANTOPRAZOLE SODIUM 40 MG/1
40 TABLET, DELAYED RELEASE ORAL
Status: DISCONTINUED | OUTPATIENT
Start: 2024-04-08 | End: 2024-04-10 | Stop reason: HOSPADM

## 2024-04-07 RX ORDER — ACETAMINOPHEN 650 MG/1
650 SUPPOSITORY RECTAL EVERY 6 HOURS PRN
Status: DISCONTINUED | OUTPATIENT
Start: 2024-04-07 | End: 2024-04-10 | Stop reason: HOSPADM

## 2024-04-07 RX ORDER — ONDANSETRON 4 MG/1
4 TABLET, ORALLY DISINTEGRATING ORAL EVERY 8 HOURS PRN
Status: DISCONTINUED | OUTPATIENT
Start: 2024-04-07 | End: 2024-04-10 | Stop reason: HOSPADM

## 2024-04-07 RX ORDER — BUPROPION HYDROCHLORIDE 150 MG/1
150 TABLET ORAL EVERY MORNING
Status: DISCONTINUED | OUTPATIENT
Start: 2024-04-07 | End: 2024-04-07

## 2024-04-07 RX ORDER — SODIUM CHLORIDE 0.9 % (FLUSH) 0.9 %
5-40 SYRINGE (ML) INJECTION EVERY 12 HOURS SCHEDULED
Status: DISCONTINUED | OUTPATIENT
Start: 2024-04-07 | End: 2024-04-10 | Stop reason: HOSPADM

## 2024-04-07 RX ORDER — METOPROLOL SUCCINATE 50 MG/1
100 TABLET, EXTENDED RELEASE ORAL DAILY
Status: DISCONTINUED | OUTPATIENT
Start: 2024-04-07 | End: 2024-04-08

## 2024-04-07 RX ORDER — POLYETHYLENE GLYCOL 3350 17 G/17G
17 POWDER, FOR SOLUTION ORAL DAILY PRN
Status: DISCONTINUED | OUTPATIENT
Start: 2024-04-07 | End: 2024-04-10 | Stop reason: HOSPADM

## 2024-04-07 RX ORDER — VALSARTAN AND HYDROCHLOROTHIAZIDE 160; 12.5 MG/1; MG/1
2 TABLET, FILM COATED ORAL DAILY
Status: DISCONTINUED | OUTPATIENT
Start: 2024-04-07 | End: 2024-04-07

## 2024-04-07 RX ORDER — ASPIRIN 81 MG/1
81 TABLET, CHEWABLE ORAL DAILY
Status: DISCONTINUED | OUTPATIENT
Start: 2024-04-08 | End: 2024-04-10 | Stop reason: HOSPADM

## 2024-04-07 RX ORDER — AMLODIPINE BESYLATE 5 MG/1
5 TABLET ORAL DAILY
Status: DISCONTINUED | OUTPATIENT
Start: 2024-04-07 | End: 2024-04-07

## 2024-04-07 RX ORDER — POTASSIUM CHLORIDE 7.45 MG/ML
10 INJECTION INTRAVENOUS PRN
Status: DISCONTINUED | OUTPATIENT
Start: 2024-04-07 | End: 2024-04-10 | Stop reason: HOSPADM

## 2024-04-07 RX ORDER — AMLODIPINE BESYLATE 10 MG/1
10 TABLET ORAL DAILY
Status: DISCONTINUED | OUTPATIENT
Start: 2024-04-07 | End: 2024-04-10

## 2024-04-07 RX ORDER — METHYLPREDNISOLONE SODIUM SUCCINATE 40 MG/ML
40 INJECTION, POWDER, LYOPHILIZED, FOR SOLUTION INTRAMUSCULAR; INTRAVENOUS EVERY 12 HOURS
Status: DISCONTINUED | OUTPATIENT
Start: 2024-04-07 | End: 2024-04-09

## 2024-04-07 RX ORDER — CYCLOBENZAPRINE HCL 10 MG
10 TABLET ORAL 3 TIMES DAILY PRN
Status: DISCONTINUED | OUTPATIENT
Start: 2024-04-07 | End: 2024-04-07

## 2024-04-07 RX ORDER — SODIUM CHLORIDE 0.9 % (FLUSH) 0.9 %
5-40 SYRINGE (ML) INJECTION PRN
Status: DISCONTINUED | OUTPATIENT
Start: 2024-04-07 | End: 2024-04-10 | Stop reason: HOSPADM

## 2024-04-07 RX ORDER — ONDANSETRON 2 MG/ML
4 INJECTION INTRAMUSCULAR; INTRAVENOUS EVERY 6 HOURS PRN
Status: DISCONTINUED | OUTPATIENT
Start: 2024-04-07 | End: 2024-04-10 | Stop reason: HOSPADM

## 2024-04-07 RX ORDER — DULOXETIN HYDROCHLORIDE 20 MG/1
40 CAPSULE, DELAYED RELEASE ORAL DAILY
Status: DISCONTINUED | OUTPATIENT
Start: 2024-04-07 | End: 2024-04-10 | Stop reason: HOSPADM

## 2024-04-07 RX ORDER — AMLODIPINE BESYLATE 10 MG/1
10 TABLET ORAL DAILY
Status: ON HOLD | COMMUNITY
End: 2024-04-10 | Stop reason: HOSPADM

## 2024-04-07 RX ORDER — POTASSIUM CHLORIDE 20 MEQ/1
40 TABLET, EXTENDED RELEASE ORAL PRN
Status: DISCONTINUED | OUTPATIENT
Start: 2024-04-07 | End: 2024-04-10 | Stop reason: HOSPADM

## 2024-04-07 RX ORDER — ACETAMINOPHEN 325 MG/1
650 TABLET ORAL EVERY 6 HOURS PRN
Status: DISCONTINUED | OUTPATIENT
Start: 2024-04-07 | End: 2024-04-10 | Stop reason: HOSPADM

## 2024-04-07 RX ORDER — BENZONATATE 100 MG/1
100 CAPSULE ORAL ONCE
Status: DISCONTINUED | OUTPATIENT
Start: 2024-04-07 | End: 2024-04-09

## 2024-04-07 RX ORDER — METOPROLOL SUCCINATE 50 MG/1
50 TABLET, EXTENDED RELEASE ORAL DAILY
Status: DISCONTINUED | OUTPATIENT
Start: 2024-04-07 | End: 2024-04-07

## 2024-04-07 RX ORDER — INSULIN GLARGINE 100 [IU]/ML
30 INJECTION, SOLUTION SUBCUTANEOUS NIGHTLY
Status: DISCONTINUED | OUTPATIENT
Start: 2024-04-07 | End: 2024-04-08

## 2024-04-07 RX ORDER — ASPIRIN 81 MG/1
324 TABLET, CHEWABLE ORAL ONCE
Status: COMPLETED | OUTPATIENT
Start: 2024-04-07 | End: 2024-04-07

## 2024-04-07 RX ORDER — INSULIN LISPRO 100 [IU]/ML
8 INJECTION, SOLUTION INTRAVENOUS; SUBCUTANEOUS ONCE
Status: COMPLETED | OUTPATIENT
Start: 2024-04-07 | End: 2024-04-07

## 2024-04-07 RX ORDER — MAGNESIUM SULFATE IN WATER 40 MG/ML
2000 INJECTION, SOLUTION INTRAVENOUS PRN
Status: DISCONTINUED | OUTPATIENT
Start: 2024-04-07 | End: 2024-04-10 | Stop reason: HOSPADM

## 2024-04-07 RX ORDER — VALSARTAN 80 MG/1
160 TABLET ORAL DAILY
Status: DISCONTINUED | OUTPATIENT
Start: 2024-04-07 | End: 2024-04-10 | Stop reason: DRUGHIGH

## 2024-04-07 RX ORDER — ENOXAPARIN SODIUM 100 MG/ML
30 INJECTION SUBCUTANEOUS 2 TIMES DAILY
Status: DISCONTINUED | OUTPATIENT
Start: 2024-04-07 | End: 2024-04-10 | Stop reason: HOSPADM

## 2024-04-07 RX ORDER — GLUCAGON 1 MG/ML
1 KIT INJECTION PRN
Status: DISCONTINUED | OUTPATIENT
Start: 2024-04-07 | End: 2024-04-10 | Stop reason: HOSPADM

## 2024-04-07 RX ORDER — GABAPENTIN 600 MG/1
600 TABLET ORAL 3 TIMES DAILY
Status: DISCONTINUED | OUTPATIENT
Start: 2024-04-07 | End: 2024-04-07

## 2024-04-07 RX ADMIN — BUPROPION HYDROCHLORIDE 300 MG: 150 TABLET, EXTENDED RELEASE ORAL at 15:21

## 2024-04-07 RX ADMIN — SODIUM CHLORIDE, PRESERVATIVE FREE 10 ML: 5 INJECTION INTRAVENOUS at 19:54

## 2024-04-07 RX ADMIN — GABAPENTIN 800 MG: 400 CAPSULE ORAL at 15:21

## 2024-04-07 RX ADMIN — INSULIN GLARGINE 30 UNITS: 100 INJECTION, SOLUTION SUBCUTANEOUS at 20:13

## 2024-04-07 RX ADMIN — IPRATROPIUM BROMIDE AND ALBUTEROL SULFATE 1 DOSE: 2.5; .5 SOLUTION RESPIRATORY (INHALATION) at 11:36

## 2024-04-07 RX ADMIN — METHYLPREDNISOLONE SODIUM SUCCINATE 125 MG: 125 INJECTION INTRAMUSCULAR; INTRAVENOUS at 09:02

## 2024-04-07 RX ADMIN — DOXYCYCLINE 100 MG: 100 INJECTION, POWDER, LYOPHILIZED, FOR SOLUTION INTRAVENOUS at 15:33

## 2024-04-07 RX ADMIN — AMLODIPINE BESYLATE 10 MG: 10 TABLET ORAL at 15:21

## 2024-04-07 RX ADMIN — IPRATROPIUM BROMIDE AND ALBUTEROL SULFATE 1 DOSE: 2.5; .5 SOLUTION RESPIRATORY (INHALATION) at 15:48

## 2024-04-07 RX ADMIN — VALSARTAN AND HYDROCHLOROTHIAZIDE 1 TABLET: 160; 25 TABLET, FILM COATED ORAL at 15:21

## 2024-04-07 RX ADMIN — INSULIN LISPRO 4 UNITS: 100 INJECTION, SOLUTION INTRAVENOUS; SUBCUTANEOUS at 20:13

## 2024-04-07 RX ADMIN — ONDANSETRON 4 MG: 2 INJECTION INTRAMUSCULAR; INTRAVENOUS at 19:54

## 2024-04-07 RX ADMIN — INSULIN LISPRO 16 UNITS: 100 INJECTION, SOLUTION INTRAVENOUS; SUBCUTANEOUS at 16:59

## 2024-04-07 RX ADMIN — ASPIRIN 324 MG: 81 TABLET, CHEWABLE ORAL at 11:47

## 2024-04-07 RX ADMIN — ACETAMINOPHEN 325MG 650 MG: 325 TABLET ORAL at 19:55

## 2024-04-07 RX ADMIN — INSULIN LISPRO 8 UNITS: 100 INJECTION, SOLUTION INTRAVENOUS; SUBCUTANEOUS at 20:13

## 2024-04-07 RX ADMIN — GABAPENTIN 800 MG: 400 CAPSULE ORAL at 19:55

## 2024-04-07 RX ADMIN — METOPROLOL SUCCINATE 100 MG: 50 TABLET, EXTENDED RELEASE ORAL at 15:21

## 2024-04-07 RX ADMIN — METHYLPREDNISOLONE SODIUM SUCCINATE 40 MG: 40 INJECTION INTRAMUSCULAR; INTRAVENOUS at 19:55

## 2024-04-07 RX ADMIN — IPRATROPIUM BROMIDE AND ALBUTEROL SULFATE 1 DOSE: 2.5; .5 SOLUTION RESPIRATORY (INHALATION) at 09:04

## 2024-04-07 RX ADMIN — VALSARTAN 160 MG: 80 TABLET ORAL at 15:21

## 2024-04-07 RX ADMIN — DULOXETINE HYDROCHLORIDE 40 MG: 20 CAPSULE, DELAYED RELEASE ORAL at 15:39

## 2024-04-07 ASSESSMENT — HEART SCORE: ECG: NON-SPECIFC REPOLARIZATION DISTURBANCE/LBTB/PM

## 2024-04-07 ASSESSMENT — PAIN DESCRIPTION - LOCATION
LOCATION: CHEST
LOCATION: CHEST;RIB CAGE
LOCATION_2: FLANK
LOCATION: HEAD

## 2024-04-07 ASSESSMENT — PAIN DESCRIPTION - PAIN TYPE
TYPE: ACUTE PAIN
TYPE: ACUTE PAIN

## 2024-04-07 ASSESSMENT — PAIN DESCRIPTION - FREQUENCY
FREQUENCY: CONTINUOUS
FREQUENCY: INTERMITTENT

## 2024-04-07 ASSESSMENT — PAIN DESCRIPTION - ORIENTATION
ORIENTATION: RIGHT;LEFT
ORIENTATION: ANTERIOR
ORIENTATION: MID
ORIENTATION_2: LEFT

## 2024-04-07 ASSESSMENT — PAIN DESCRIPTION - DESCRIPTORS
DESCRIPTORS: ACHING
DESCRIPTORS: DISCOMFORT;ACHING
DESCRIPTORS: PRESSURE
DESCRIPTORS_2: ACHING;SORE

## 2024-04-07 ASSESSMENT — PAIN - FUNCTIONAL ASSESSMENT
PAIN_FUNCTIONAL_ASSESSMENT: ACTIVITIES ARE NOT PREVENTED
PAIN_FUNCTIONAL_ASSESSMENT: 0-10
PAIN_FUNCTIONAL_ASSESSMENT: ACTIVITIES ARE NOT PREVENTED

## 2024-04-07 ASSESSMENT — PAIN DESCRIPTION - ONSET: ONSET: ON-GOING

## 2024-04-07 ASSESSMENT — PAIN DESCRIPTION - INTENSITY: RATING_2: 4

## 2024-04-07 ASSESSMENT — PAIN SCALES - GENERAL
PAINLEVEL_OUTOF10: 7
PAINLEVEL_OUTOF10: 3
PAINLEVEL_OUTOF10: 4

## 2024-04-07 NOTE — ED TRIAGE NOTES
Pt arrived from home via private vehicle c/o chest pain, shortness of breath, left flank pain, and cough worsening over the last 3 days. Pt A&Ox4, ambulatory with hx of asthma. Expiratory wheezing audible. Pt calm and cooperative. Hypertensive with history, tachycardic at this time. Other VS stable. Skin warm and dry.

## 2024-04-07 NOTE — ED PROVIDER NOTES
[facility]  EMERGENCY DEPARTMENT ENCOUNTER      Pt Name: Cassie Brink  MRN: [id number]  Birthdate 1985  Date of evaluation: 4/7/2024  Provider: BON CARMONA DO    CHIEF COMPLAINT       Chief Complaint   Patient presents with    Chest Pain    Cough    Shortness of Breath     Chest pain with cough and shortness of breath started 3 days ago.         HISTORY OF PRESENT ILLNESS   (Location/Symptom, Timing/Onset, Context/Setting, Quality, Duration, Modifying Factors, Severity)  Note limiting factors.   Cassie Brink is a 39 y.o. female who presents to the emergency department with a complaint of chest pain shortness of breath and a cough.  The patient states that she became ill 3 days ago with a dry nonproductive cough, increased wheezing.  She reports that she has been coughing so much that her ribs are sore on both sides.  She complains of some sharp pain in the midsternal area that mostly occurs with coughing.  She denies any exertional chest pain.  She has been wheezing more than usual.  No productive sputum.  No fever or chills.  She does report some body aches.  No exposure to illness.    She does report 1 episode of posttussive vomiting a few days ago.  No further vomiting.  No diarrhea.  No dysuria hematuria frequency or urgency.  No abdominal pain.  She denies any headache or earache or sore throat.  She does report some slight nasal congestion.    She does have a history of asthma.  She uses her inhaler once or twice a week at baseline.  Her last emergency department visit for asthma was 1 year ago.  She does not smoke.  She does not take prednisone.    She denies any lower extremity edema, or swelling.  No recent travel.  No calf pain.  She denies any personal or family history of thromboembolic disease.  She does not take any hormone medications.  No recent surgeries or procedures.    Medical history significant for anxiety, GERD, asthma, type 2 diabetes, hypertension.    Nursing Notes

## 2024-04-07 NOTE — H&P
Appendectomy (12/14/2018); and laparoscopic appendectomy (N/A, 12/14/2018).  Allergies:   Allergies   Allergen Reactions    Lisinopril      cough    Augmentin [Amoxicillin-Pot Clavulanate] Rash     Fam HX:   reviewed  family history includes Asthma in her father; Breast Cancer in her maternal grandmother; Cancer in her maternal grandmother; Diabetes in her paternal grandmother; High Blood Pressure in her father and mother.  Soc HX:   Social History     Socioeconomic History    Marital status: Single     Spouse name: None    Number of children: None    Years of education: None    Highest education level: None   Tobacco Use    Smoking status: Never    Smokeless tobacco: Never   Vaping Use    Vaping Use: Never used   Substance and Sexual Activity    Alcohol use: Yes     Comment: rarely    Drug use: Not Currently     Types: Marijuana (Weed)    Sexual activity: Yes     Partners: Male     Social Determinants of Health     Financial Resource Strain: High Risk (7/14/2022)    Overall Financial Resource Strain (CARDIA)     Difficulty of Paying Living Expenses: Very hard   Food Insecurity: Food Insecurity Present (7/14/2022)    Hunger Vital Sign     Worried About Running Out of Food in the Last Year: Often true     Ran Out of Food in the Last Year: Never true   Transportation Needs: No Transportation Needs (2/2/2021)    PRAPARE - Transportation     Lack of Transportation (Medical): No     Lack of Transportation (Non-Medical): No       Medications:   Medications:    benzonatate  100 mg Oral Once    aspirin  324 mg Oral Once      Infusions:   PRN Meds:      Labs      CBC:   Recent Labs     04/07/24  0854   WBC 6.0   HGB 13.1        BMP:    Recent Labs     04/07/24  0854      K 4.8      CO2 25   BUN 32*   CREATININE 1.2*   GLUCOSE 195*     Hepatic:   Recent Labs     04/07/24  0854   AST 15   ALT 13   BILITOT <0.2   ALKPHOS 126     Lipids:   Lab Results   Component Value Date/Time    CHOL 194 12/22/2022 09:10 AM

## 2024-04-08 PROBLEM — J06.9 VIRAL URI: Status: ACTIVE | Noted: 2024-04-08

## 2024-04-08 PROBLEM — F12.10 MARIJUANA ABUSE: Status: ACTIVE | Noted: 2024-04-08

## 2024-04-08 LAB
ANION GAP SERPL CALCULATED.3IONS-SCNC: 13 MMOL/L (ref 3–16)
BASOPHILS # BLD: 0 K/UL (ref 0–0.2)
BASOPHILS NFR BLD: 0.1 %
BUN SERPL-MCNC: 35 MG/DL (ref 7–20)
CALCIUM SERPL-MCNC: 9.3 MG/DL (ref 8.3–10.6)
CHLORIDE SERPL-SCNC: 99 MMOL/L (ref 99–110)
CO2 SERPL-SCNC: 20 MMOL/L (ref 21–32)
CREAT SERPL-MCNC: 1.4 MG/DL (ref 0.6–1.1)
DEPRECATED RDW RBC AUTO: 14.1 % (ref 12.4–15.4)
EKG ATRIAL RATE: 101 BPM
EKG DIAGNOSIS: NORMAL
EKG P AXIS: 54 DEGREES
EKG P-R INTERVAL: 176 MS
EKG Q-T INTERVAL: 342 MS
EKG QRS DURATION: 70 MS
EKG QTC CALCULATION (BAZETT): 443 MS
EKG R AXIS: 42 DEGREES
EKG T AXIS: 49 DEGREES
EKG VENTRICULAR RATE: 101 BPM
EOSINOPHIL # BLD: 0 K/UL (ref 0–0.6)
EOSINOPHIL NFR BLD: 0 %
EST. AVERAGE GLUCOSE BLD GHB EST-MCNC: 237.4 MG/DL
GFR SERPLBLD CREATININE-BSD FMLA CKD-EPI: 49 ML/MIN/{1.73_M2}
GLUCOSE BLD-MCNC: 230 MG/DL (ref 70–99)
GLUCOSE BLD-MCNC: 264 MG/DL (ref 70–99)
GLUCOSE BLD-MCNC: 305 MG/DL (ref 70–99)
GLUCOSE BLD-MCNC: 455 MG/DL (ref 70–99)
GLUCOSE SERPL-MCNC: 506 MG/DL (ref 70–99)
HBA1C MFR BLD: 9.9 %
HCT VFR BLD AUTO: 33.6 % (ref 36–48)
HGB BLD-MCNC: 11.2 G/DL (ref 12–16)
LYMPHOCYTES # BLD: 0.8 K/UL (ref 1–5.1)
LYMPHOCYTES NFR BLD: 9.1 %
MCH RBC QN AUTO: 28.3 PG (ref 26–34)
MCHC RBC AUTO-ENTMCNC: 33.3 G/DL (ref 31–36)
MCV RBC AUTO: 85 FL (ref 80–100)
MONOCYTES # BLD: 0.5 K/UL (ref 0–1.3)
MONOCYTES NFR BLD: 5.9 %
NEUTROPHILS # BLD: 7.8 K/UL (ref 1.7–7.7)
NEUTROPHILS NFR BLD: 84.9 %
PERFORMED ON: ABNORMAL
PLATELET # BLD AUTO: 338 K/UL (ref 135–450)
PMV BLD AUTO: 8.5 FL (ref 5–10.5)
POTASSIUM SERPL-SCNC: 4.9 MMOL/L (ref 3.5–5.1)
RBC # BLD AUTO: 3.96 M/UL (ref 4–5.2)
SODIUM SERPL-SCNC: 132 MMOL/L (ref 136–145)
TSH SERPL DL<=0.005 MIU/L-ACNC: 0.57 UIU/ML (ref 0.27–4.2)
WBC # BLD AUTO: 9.2 K/UL (ref 4–11)

## 2024-04-08 PROCEDURE — 2580000003 HC RX 258: Performed by: HOSPITALIST

## 2024-04-08 PROCEDURE — 6370000000 HC RX 637 (ALT 250 FOR IP): Performed by: HOSPITALIST

## 2024-04-08 PROCEDURE — 94640 AIRWAY INHALATION TREATMENT: CPT

## 2024-04-08 PROCEDURE — 82785 ASSAY OF IGE: CPT

## 2024-04-08 PROCEDURE — 84443 ASSAY THYROID STIM HORMONE: CPT

## 2024-04-08 PROCEDURE — 86003 ALLG SPEC IGE CRUDE XTRC EA: CPT

## 2024-04-08 PROCEDURE — 6360000002 HC RX W HCPCS: Performed by: HOSPITALIST

## 2024-04-08 PROCEDURE — 1200000000 HC SEMI PRIVATE

## 2024-04-08 PROCEDURE — 94760 N-INVAS EAR/PLS OXIMETRY 1: CPT

## 2024-04-08 PROCEDURE — 80048 BASIC METABOLIC PNL TOTAL CA: CPT

## 2024-04-08 PROCEDURE — 6370000000 HC RX 637 (ALT 250 FOR IP): Performed by: INTERNAL MEDICINE

## 2024-04-08 PROCEDURE — 83036 HEMOGLOBIN GLYCOSYLATED A1C: CPT

## 2024-04-08 PROCEDURE — 93010 ELECTROCARDIOGRAM REPORT: CPT | Performed by: INTERNAL MEDICINE

## 2024-04-08 PROCEDURE — 36415 COLL VENOUS BLD VENIPUNCTURE: CPT

## 2024-04-08 PROCEDURE — 2500000003 HC RX 250 WO HCPCS: Performed by: HOSPITALIST

## 2024-04-08 PROCEDURE — 2580000003 HC RX 258

## 2024-04-08 PROCEDURE — 99223 1ST HOSP IP/OBS HIGH 75: CPT | Performed by: INTERNAL MEDICINE

## 2024-04-08 PROCEDURE — 85025 COMPLETE CBC W/AUTO DIFF WBC: CPT

## 2024-04-08 PROCEDURE — 6370000000 HC RX 637 (ALT 250 FOR IP): Performed by: STUDENT IN AN ORGANIZED HEALTH CARE EDUCATION/TRAINING PROGRAM

## 2024-04-08 RX ORDER — METOPROLOL SUCCINATE 50 MG/1
200 TABLET, EXTENDED RELEASE ORAL DAILY
Status: DISCONTINUED | OUTPATIENT
Start: 2024-04-08 | End: 2024-04-10 | Stop reason: HOSPADM

## 2024-04-08 RX ORDER — METOPROLOL SUCCINATE 100 MG/1
200 TABLET, EXTENDED RELEASE ORAL DAILY
COMMUNITY

## 2024-04-08 RX ORDER — GUAIFENESIN 600 MG/1
600 TABLET, EXTENDED RELEASE ORAL 2 TIMES DAILY
Status: DISCONTINUED | OUTPATIENT
Start: 2024-04-08 | End: 2024-04-10 | Stop reason: HOSPADM

## 2024-04-08 RX ORDER — INSULIN LISPRO 100 [IU]/ML
10 INJECTION, SOLUTION INTRAVENOUS; SUBCUTANEOUS
Status: DISCONTINUED | OUTPATIENT
Start: 2024-04-08 | End: 2024-04-08

## 2024-04-08 RX ORDER — IPRATROPIUM BROMIDE AND ALBUTEROL SULFATE 2.5; .5 MG/3ML; MG/3ML
1 SOLUTION RESPIRATORY (INHALATION)
Status: DISCONTINUED | OUTPATIENT
Start: 2024-04-08 | End: 2024-04-10 | Stop reason: HOSPADM

## 2024-04-08 RX ORDER — GUAIFENESIN/DEXTROMETHORPHAN 100-10MG/5
5 SYRUP ORAL EVERY 4 HOURS PRN
Status: DISCONTINUED | OUTPATIENT
Start: 2024-04-08 | End: 2024-04-10 | Stop reason: HOSPADM

## 2024-04-08 RX ORDER — GABAPENTIN 400 MG/1
800 CAPSULE ORAL 3 TIMES DAILY
COMMUNITY

## 2024-04-08 RX ORDER — WATER 10 ML/10ML
INJECTION INTRAMUSCULAR; INTRAVENOUS; SUBCUTANEOUS
Status: COMPLETED
Start: 2024-04-08 | End: 2024-04-08

## 2024-04-08 RX ORDER — BUPROPION HYDROCHLORIDE 300 MG/1
300 TABLET ORAL EVERY MORNING
COMMUNITY

## 2024-04-08 RX ORDER — VALSARTAN AND HYDROCHLOROTHIAZIDE 320; 25 MG/1; MG/1
1 TABLET, FILM COATED ORAL DAILY
Status: ON HOLD | COMMUNITY
End: 2024-04-10 | Stop reason: HOSPADM

## 2024-04-08 RX ORDER — INSULIN LISPRO 100 [IU]/ML
12 INJECTION, SOLUTION INTRAVENOUS; SUBCUTANEOUS
Status: DISCONTINUED | OUTPATIENT
Start: 2024-04-08 | End: 2024-04-09

## 2024-04-08 RX ORDER — INSULIN GLARGINE 100 [IU]/ML
37 INJECTION, SOLUTION SUBCUTANEOUS NIGHTLY
Status: DISCONTINUED | OUTPATIENT
Start: 2024-04-08 | End: 2024-04-09

## 2024-04-08 RX ADMIN — VALSARTAN AND HYDROCHLOROTHIAZIDE 1 TABLET: 160; 25 TABLET, FILM COATED ORAL at 08:43

## 2024-04-08 RX ADMIN — AMLODIPINE BESYLATE 10 MG: 10 TABLET ORAL at 08:44

## 2024-04-08 RX ADMIN — GABAPENTIN 800 MG: 400 CAPSULE ORAL at 21:17

## 2024-04-08 RX ADMIN — WATER 10 ML: 1 INJECTION INTRAMUSCULAR; INTRAVENOUS; SUBCUTANEOUS at 08:45

## 2024-04-08 RX ADMIN — INSULIN LISPRO 16 UNITS: 100 INJECTION, SOLUTION INTRAVENOUS; SUBCUTANEOUS at 08:43

## 2024-04-08 RX ADMIN — VALSARTAN 160 MG: 80 TABLET ORAL at 08:44

## 2024-04-08 RX ADMIN — DULOXETINE HYDROCHLORIDE 40 MG: 20 CAPSULE, DELAYED RELEASE ORAL at 08:44

## 2024-04-08 RX ADMIN — INSULIN LISPRO 12 UNITS: 100 INJECTION, SOLUTION INTRAVENOUS; SUBCUTANEOUS at 17:47

## 2024-04-08 RX ADMIN — SODIUM CHLORIDE, PRESERVATIVE FREE 10 ML: 5 INJECTION INTRAVENOUS at 08:46

## 2024-04-08 RX ADMIN — DOXYCYCLINE 100 MG: 100 INJECTION, POWDER, LYOPHILIZED, FOR SOLUTION INTRAVENOUS at 03:07

## 2024-04-08 RX ADMIN — METHYLPREDNISOLONE SODIUM SUCCINATE 40 MG: 40 INJECTION INTRAMUSCULAR; INTRAVENOUS at 21:16

## 2024-04-08 RX ADMIN — GUAIFENESIN 600 MG: 600 TABLET ORAL at 12:34

## 2024-04-08 RX ADMIN — BUPROPION HYDROCHLORIDE 300 MG: 150 TABLET, EXTENDED RELEASE ORAL at 08:44

## 2024-04-08 RX ADMIN — DOXYCYCLINE 100 MG: 100 INJECTION, POWDER, LYOPHILIZED, FOR SOLUTION INTRAVENOUS at 15:21

## 2024-04-08 RX ADMIN — GUAIFENESIN 600 MG: 600 TABLET ORAL at 21:16

## 2024-04-08 RX ADMIN — INSULIN GLARGINE 37 UNITS: 100 INJECTION, SOLUTION SUBCUTANEOUS at 21:10

## 2024-04-08 RX ADMIN — IPRATROPIUM BROMIDE AND ALBUTEROL SULFATE 1 DOSE: 2.5; .5 SOLUTION RESPIRATORY (INHALATION) at 16:11

## 2024-04-08 RX ADMIN — INSULIN LISPRO 10 UNITS: 100 INJECTION, SOLUTION INTRAVENOUS; SUBCUTANEOUS at 08:46

## 2024-04-08 RX ADMIN — GABAPENTIN 800 MG: 400 CAPSULE ORAL at 08:59

## 2024-04-08 RX ADMIN — METHYLPREDNISOLONE SODIUM SUCCINATE 40 MG: 40 INJECTION INTRAMUSCULAR; INTRAVENOUS at 08:44

## 2024-04-08 RX ADMIN — ACETAMINOPHEN 325MG 650 MG: 325 TABLET ORAL at 23:52

## 2024-04-08 RX ADMIN — IPRATROPIUM BROMIDE AND ALBUTEROL SULFATE 1 DOSE: 2.5; .5 SOLUTION RESPIRATORY (INHALATION) at 08:33

## 2024-04-08 RX ADMIN — METOPROLOL SUCCINATE 200 MG: 50 TABLET, EXTENDED RELEASE ORAL at 08:44

## 2024-04-08 RX ADMIN — GABAPENTIN 800 MG: 400 CAPSULE ORAL at 15:19

## 2024-04-08 RX ADMIN — SODIUM CHLORIDE, PRESERVATIVE FREE 10 ML: 5 INJECTION INTRAVENOUS at 21:16

## 2024-04-08 RX ADMIN — INSULIN LISPRO 12 UNITS: 100 INJECTION, SOLUTION INTRAVENOUS; SUBCUTANEOUS at 12:34

## 2024-04-08 RX ADMIN — DEXTROMETHORPHAN HYDROBROMIDE, GUAIFENESIN 5 ML: 10; 100 LIQUID ORAL at 23:52

## 2024-04-08 RX ADMIN — INSULIN LISPRO 10 UNITS: 100 INJECTION, SOLUTION INTRAVENOUS; SUBCUTANEOUS at 12:34

## 2024-04-08 RX ADMIN — ASPIRIN 81 MG: 81 TABLET, CHEWABLE ORAL at 08:44

## 2024-04-08 RX ADMIN — IPRATROPIUM BROMIDE AND ALBUTEROL SULFATE 1 DOSE: 2.5; .5 SOLUTION RESPIRATORY (INHALATION) at 21:25

## 2024-04-08 RX ADMIN — MOMETASONE FUROATE AND FORMOTEROL FUMARATE DIHYDRATE 2 PUFF: 200; 5 AEROSOL RESPIRATORY (INHALATION) at 21:25

## 2024-04-08 RX ADMIN — INSULIN LISPRO 4 UNITS: 100 INJECTION, SOLUTION INTRAVENOUS; SUBCUTANEOUS at 17:46

## 2024-04-08 ASSESSMENT — PAIN DESCRIPTION - LOCATION
LOCATION: CHEST;RIB CAGE
LOCATION: CHEST;FLANK

## 2024-04-08 ASSESSMENT — PAIN SCALES - GENERAL
PAINLEVEL_OUTOF10: 5
PAINLEVEL_OUTOF10: 0
PAINLEVEL_OUTOF10: 7

## 2024-04-08 ASSESSMENT — PAIN SCALES - WONG BAKER: WONGBAKER_NUMERICALRESPONSE: NO HURT

## 2024-04-08 ASSESSMENT — PAIN DESCRIPTION - PAIN TYPE: TYPE: ACUTE PAIN

## 2024-04-08 ASSESSMENT — PAIN DESCRIPTION - ORIENTATION: ORIENTATION: RIGHT;LEFT

## 2024-04-08 ASSESSMENT — PAIN - FUNCTIONAL ASSESSMENT: PAIN_FUNCTIONAL_ASSESSMENT: ACTIVITIES ARE NOT PREVENTED

## 2024-04-08 ASSESSMENT — PAIN DESCRIPTION - DESCRIPTORS: DESCRIPTORS: TIGHTNESS

## 2024-04-08 NOTE — CARE COORDINATION
Discharge Planning:      (CM) reviewed the patient's chart to assess needs. Patient's Readmission Risk Score is 12%   . Patient's medical insurance is  Payor: [facility] MEDICAID / Plan: [facility] MEDICAID / Product Type: *No Product type* / .  Patient's PCP is Millicent Cortés MD .  No needs anticipated, at this time. CM team to follow. Staff to inform CM if additional discharge needs arise.    Pts preferred pharmacy is   [facility] [address] [place], [place] - [address] - P [phone] - F [phone]  [address] [facility] [address]  Phone: [phone] Fax: [phone]    [facility] [id number] [place], [place] - [address] - P [phone] - F [phone]  [address] [facility] [address]  Phone: [phone] Fax: [phone]    Please consult SW/CM if a d/c need should arise.   JOSE Piña  [phone]  Electronically signed by JOSE Laguna on 4/8/2024 at 9:08 AM

## 2024-04-08 NOTE — PLAN OF CARE
Problem: Discharge Planning  Goal: Discharge to home or other facility with appropriate resources  Outcome: Progressing     Problem: Pain  Goal: Verbalizes/displays adequate comfort level or baseline comfort level  Outcome: Progressing  Flowsheets (Taken 4/7/2024 1955)  Verbalizes/displays adequate comfort level or baseline comfort level: Encourage patient to monitor pain and request assistance     Problem: ABCDS Injury Assessment  Goal: Absence of physical injury  Outcome: Progressing  Flowsheets (Taken 4/8/2024 0336)  Absence of Physical Injury: Implement safety measures based on patient assessment     Problem: Respiratory - Adult  Goal: Achieves optimal ventilation and oxygenation  Outcome: Progressing  Flowsheets (Taken 4/7/2024 2115)  Achieves optimal ventilation and oxygenation: Assess for changes in respiratory status     Problem: Cardiovascular - Adult  Goal: Maintains optimal cardiac output and hemodynamic stability  Outcome: Progressing  Flowsheets (Taken 4/7/2024 2115)  Maintains optimal cardiac output and hemodynamic stability: Monitor blood pressure and heart rate

## 2024-04-08 NOTE — PLAN OF CARE
Problem: Discharge Planning  Goal: Discharge to home or other facility with appropriate resources  4/8/2024 1143 by Gosia Marshall RN  Flowsheets (Taken 4/8/2024 1143)  Discharge to home or other facility with appropriate resources:   Arrange for needed discharge resources and transportation as appropriate   Identify barriers to discharge with patient and caregiver     Problem: Pain  Goal: Verbalizes/displays adequate comfort level or baseline comfort level  4/8/2024 1143 by Gosia Marshall RN  Flowsheets (Taken 4/8/2024 1143)  Verbalizes/displays adequate comfort level or baseline comfort level:   Encourage patient to monitor pain and request assistance   Assess pain using appropriate pain scale   Administer analgesics based on type and severity of pain and evaluate response     Problem: ABCDS Injury Assessment  Goal: Absence of physical injury  4/8/2024 1143 by Gosia Marshall RN  Flowsheets (Taken 4/8/2024 1143)  Absence of Physical Injury: Implement safety measures based on patient assessment

## 2024-04-09 LAB
ANION GAP SERPL CALCULATED.3IONS-SCNC: 16 MMOL/L (ref 3–16)
BACTERIA URNS QL MICRO: ABNORMAL /HPF
BASOPHILS # BLD: 0 K/UL (ref 0–0.2)
BASOPHILS NFR BLD: 0.5 %
BILIRUB UR QL STRIP.AUTO: NEGATIVE
BUN SERPL-MCNC: 45 MG/DL (ref 7–20)
CALCIUM SERPL-MCNC: 9.4 MG/DL (ref 8.3–10.6)
CHLORIDE SERPL-SCNC: 98 MMOL/L (ref 99–110)
CLARITY UR: CLEAR
CO2 SERPL-SCNC: 20 MMOL/L (ref 21–32)
COLOR UR: YELLOW
CREAT SERPL-MCNC: 1.3 MG/DL (ref 0.6–1.1)
CREAT UR-MCNC: 102.2 MG/DL (ref 28–259)
DEPRECATED RDW RBC AUTO: 14.7 % (ref 12.4–15.4)
EOSINOPHIL # BLD: 0 K/UL (ref 0–0.6)
EOSINOPHIL NFR BLD: 0 %
EPI CELLS #/AREA URNS AUTO: 2 /HPF (ref 0–5)
GFR SERPLBLD CREATININE-BSD FMLA CKD-EPI: 54 ML/MIN/{1.73_M2}
GLUCOSE BLD-MCNC: 151 MG/DL (ref 70–99)
GLUCOSE BLD-MCNC: 240 MG/DL (ref 70–99)
GLUCOSE BLD-MCNC: 378 MG/DL (ref 70–99)
GLUCOSE BLD-MCNC: 449 MG/DL (ref 70–99)
GLUCOSE SERPL-MCNC: 389 MG/DL (ref 70–99)
GLUCOSE UR STRIP.AUTO-MCNC: 250 MG/DL
HCT VFR BLD AUTO: 35.7 % (ref 36–48)
HGB BLD-MCNC: 11.6 G/DL (ref 12–16)
HGB UR QL STRIP.AUTO: NEGATIVE
HYALINE CASTS #/AREA URNS AUTO: 2 /LPF (ref 0–8)
KETONES UR STRIP.AUTO-MCNC: NEGATIVE MG/DL
LEUKOCYTE ESTERASE UR QL STRIP.AUTO: NEGATIVE
LYMPHOCYTES # BLD: 1 K/UL (ref 1–5.1)
LYMPHOCYTES NFR BLD: 10.5 %
MCH RBC QN AUTO: 27.6 PG (ref 26–34)
MCHC RBC AUTO-ENTMCNC: 32.4 G/DL (ref 31–36)
MCV RBC AUTO: 85.2 FL (ref 80–100)
MICROALBUMIN UR DL<=1MG/L-MCNC: 24.2 MG/DL
MICROALBUMIN/CREAT UR: 236.8 MG/G (ref 0–30)
MONOCYTES # BLD: 0.4 K/UL (ref 0–1.3)
MONOCYTES NFR BLD: 4.6 %
NEUTROPHILS # BLD: 8 K/UL (ref 1.7–7.7)
NEUTROPHILS NFR BLD: 84.4 %
NITRITE UR QL STRIP.AUTO: NEGATIVE
PERFORMED ON: ABNORMAL
PH UR STRIP.AUTO: 5 [PH] (ref 5–8)
PLATELET # BLD AUTO: 345 K/UL (ref 135–450)
PMV BLD AUTO: 8.8 FL (ref 5–10.5)
POTASSIUM SERPL-SCNC: 4.3 MMOL/L (ref 3.5–5.1)
POTASSIUM SERPL-SCNC: 5.4 MMOL/L (ref 3.5–5.1)
PROT UR STRIP.AUTO-MCNC: 30 MG/DL
RBC # BLD AUTO: 4.19 M/UL (ref 4–5.2)
RBC CLUMPS #/AREA URNS AUTO: 1 /HPF (ref 0–4)
SODIUM SERPL-SCNC: 134 MMOL/L (ref 136–145)
SP GR UR STRIP.AUTO: 1.01 (ref 1–1.03)
UA DIPSTICK W REFLEX MICRO PNL UR: YES
URN SPEC COLLECT METH UR: ABNORMAL
UROBILINOGEN UR STRIP-ACNC: 0.2 E.U./DL
WBC # BLD AUTO: 9.5 K/UL (ref 4–11)
WBC #/AREA URNS AUTO: 2 /HPF (ref 0–5)

## 2024-04-09 PROCEDURE — 80048 BASIC METABOLIC PNL TOTAL CA: CPT

## 2024-04-09 PROCEDURE — 6370000000 HC RX 637 (ALT 250 FOR IP): Performed by: NURSE PRACTITIONER

## 2024-04-09 PROCEDURE — 87070 CULTURE OTHR SPECIMN AEROBIC: CPT

## 2024-04-09 PROCEDURE — 82043 UR ALBUMIN QUANTITATIVE: CPT

## 2024-04-09 PROCEDURE — 2500000003 HC RX 250 WO HCPCS: Performed by: HOSPITALIST

## 2024-04-09 PROCEDURE — 36415 COLL VENOUS BLD VENIPUNCTURE: CPT

## 2024-04-09 PROCEDURE — 82570 ASSAY OF URINE CREATININE: CPT

## 2024-04-09 PROCEDURE — 6370000000 HC RX 637 (ALT 250 FOR IP): Performed by: STUDENT IN AN ORGANIZED HEALTH CARE EDUCATION/TRAINING PROGRAM

## 2024-04-09 PROCEDURE — 94760 N-INVAS EAR/PLS OXIMETRY 1: CPT

## 2024-04-09 PROCEDURE — 6370000000 HC RX 637 (ALT 250 FOR IP): Performed by: INTERNAL MEDICINE

## 2024-04-09 PROCEDURE — 84132 ASSAY OF SERUM POTASSIUM: CPT

## 2024-04-09 PROCEDURE — 6360000002 HC RX W HCPCS: Performed by: HOSPITALIST

## 2024-04-09 PROCEDURE — 85025 COMPLETE CBC W/AUTO DIFF WBC: CPT

## 2024-04-09 PROCEDURE — 94669 MECHANICAL CHEST WALL OSCILL: CPT

## 2024-04-09 PROCEDURE — 99232 SBSQ HOSP IP/OBS MODERATE 35: CPT | Performed by: INTERNAL MEDICINE

## 2024-04-09 PROCEDURE — 1200000000 HC SEMI PRIVATE

## 2024-04-09 PROCEDURE — 93306 TTE W/DOPPLER COMPLETE: CPT

## 2024-04-09 PROCEDURE — 6370000000 HC RX 637 (ALT 250 FOR IP): Performed by: HOSPITALIST

## 2024-04-09 PROCEDURE — 94640 AIRWAY INHALATION TREATMENT: CPT

## 2024-04-09 PROCEDURE — 87077 CULTURE AEROBIC IDENTIFY: CPT

## 2024-04-09 PROCEDURE — 2580000003 HC RX 258: Performed by: HOSPITALIST

## 2024-04-09 PROCEDURE — 81001 URINALYSIS AUTO W/SCOPE: CPT

## 2024-04-09 PROCEDURE — 82088 ASSAY OF ALDOSTERONE: CPT

## 2024-04-09 PROCEDURE — 84244 ASSAY OF RENIN: CPT

## 2024-04-09 PROCEDURE — 87205 SMEAR GRAM STAIN: CPT

## 2024-04-09 RX ORDER — PREDNISONE 20 MG/1
20 TABLET ORAL DAILY
Status: DISCONTINUED | OUTPATIENT
Start: 2024-04-09 | End: 2024-04-10 | Stop reason: HOSPADM

## 2024-04-09 RX ORDER — DOXYCYCLINE HYCLATE 100 MG
100 TABLET ORAL 2 TIMES DAILY
Status: DISCONTINUED | OUTPATIENT
Start: 2024-04-09 | End: 2024-04-10 | Stop reason: HOSPADM

## 2024-04-09 RX ORDER — INSULIN LISPRO 100 [IU]/ML
16 INJECTION, SOLUTION INTRAVENOUS; SUBCUTANEOUS
Status: DISCONTINUED | OUTPATIENT
Start: 2024-04-09 | End: 2024-04-10 | Stop reason: HOSPADM

## 2024-04-09 RX ORDER — INSULIN GLARGINE 100 [IU]/ML
45 INJECTION, SOLUTION SUBCUTANEOUS NIGHTLY
Status: DISCONTINUED | OUTPATIENT
Start: 2024-04-09 | End: 2024-04-10 | Stop reason: HOSPADM

## 2024-04-09 RX ORDER — ISOSORBIDE MONONITRATE 30 MG/1
30 TABLET, EXTENDED RELEASE ORAL DAILY
Status: DISCONTINUED | OUTPATIENT
Start: 2024-04-09 | End: 2024-04-09

## 2024-04-09 RX ORDER — TIZANIDINE 4 MG/1
4 TABLET ORAL 2 TIMES DAILY
Status: DISCONTINUED | OUTPATIENT
Start: 2024-04-09 | End: 2024-04-10 | Stop reason: HOSPADM

## 2024-04-09 RX ADMIN — GABAPENTIN 800 MG: 400 CAPSULE ORAL at 13:54

## 2024-04-09 RX ADMIN — DOXYCYCLINE HYCLATE 100 MG: 100 TABLET, COATED ORAL at 21:54

## 2024-04-09 RX ADMIN — IPRATROPIUM BROMIDE AND ALBUTEROL SULFATE 1 DOSE: 2.5; .5 SOLUTION RESPIRATORY (INHALATION) at 08:02

## 2024-04-09 RX ADMIN — MOMETASONE FUROATE AND FORMOTEROL FUMARATE DIHYDRATE 2 PUFF: 200; 5 AEROSOL RESPIRATORY (INHALATION) at 19:22

## 2024-04-09 RX ADMIN — VALSARTAN 160 MG: 80 TABLET ORAL at 08:18

## 2024-04-09 RX ADMIN — SODIUM CHLORIDE, PRESERVATIVE FREE 10 ML: 5 INJECTION INTRAVENOUS at 08:24

## 2024-04-09 RX ADMIN — GABAPENTIN 800 MG: 400 CAPSULE ORAL at 21:54

## 2024-04-09 RX ADMIN — INSULIN LISPRO 16 UNITS: 100 INJECTION, SOLUTION INTRAVENOUS; SUBCUTANEOUS at 08:19

## 2024-04-09 RX ADMIN — INSULIN LISPRO 16 UNITS: 100 INJECTION, SOLUTION INTRAVENOUS; SUBCUTANEOUS at 17:15

## 2024-04-09 RX ADMIN — MOMETASONE FUROATE AND FORMOTEROL FUMARATE DIHYDRATE 2 PUFF: 200; 5 AEROSOL RESPIRATORY (INHALATION) at 08:02

## 2024-04-09 RX ADMIN — PREDNISONE 20 MG: 20 TABLET ORAL at 11:43

## 2024-04-09 RX ADMIN — AMLODIPINE BESYLATE 10 MG: 10 TABLET ORAL at 08:18

## 2024-04-09 RX ADMIN — DOXYCYCLINE 100 MG: 100 INJECTION, POWDER, LYOPHILIZED, FOR SOLUTION INTRAVENOUS at 03:22

## 2024-04-09 RX ADMIN — ASPIRIN 81 MG: 81 TABLET, CHEWABLE ORAL at 08:18

## 2024-04-09 RX ADMIN — VALSARTAN AND HYDROCHLOROTHIAZIDE 1 TABLET: 160; 25 TABLET, FILM COATED ORAL at 08:19

## 2024-04-09 RX ADMIN — TIZANIDINE 4 MG: 4 TABLET ORAL at 21:54

## 2024-04-09 RX ADMIN — METOPROLOL SUCCINATE 200 MG: 50 TABLET, EXTENDED RELEASE ORAL at 08:19

## 2024-04-09 RX ADMIN — GUAIFENESIN 600 MG: 600 TABLET ORAL at 21:54

## 2024-04-09 RX ADMIN — DEXTROMETHORPHAN HYDROBROMIDE, GUAIFENESIN 5 ML: 10; 100 LIQUID ORAL at 05:20

## 2024-04-09 RX ADMIN — INSULIN GLARGINE 45 UNITS: 100 INJECTION, SOLUTION SUBCUTANEOUS at 21:55

## 2024-04-09 RX ADMIN — DOXYCYCLINE HYCLATE 100 MG: 100 TABLET, COATED ORAL at 11:43

## 2024-04-09 RX ADMIN — BUPROPION HYDROCHLORIDE 300 MG: 150 TABLET, EXTENDED RELEASE ORAL at 08:18

## 2024-04-09 RX ADMIN — ACETAMINOPHEN 325MG 650 MG: 325 TABLET ORAL at 09:43

## 2024-04-09 RX ADMIN — IPRATROPIUM BROMIDE AND ALBUTEROL SULFATE 1 DOSE: 2.5; .5 SOLUTION RESPIRATORY (INHALATION) at 19:21

## 2024-04-09 RX ADMIN — METHYLPREDNISOLONE SODIUM SUCCINATE 40 MG: 40 INJECTION INTRAMUSCULAR; INTRAVENOUS at 08:18

## 2024-04-09 RX ADMIN — ISOSORBIDE MONONITRATE 30 MG: 30 TABLET, EXTENDED RELEASE ORAL at 09:37

## 2024-04-09 RX ADMIN — GABAPENTIN 800 MG: 400 CAPSULE ORAL at 08:18

## 2024-04-09 RX ADMIN — INSULIN LISPRO 16 UNITS: 100 INJECTION, SOLUTION INTRAVENOUS; SUBCUTANEOUS at 11:51

## 2024-04-09 RX ADMIN — IPRATROPIUM BROMIDE AND ALBUTEROL SULFATE 1 DOSE: 2.5; .5 SOLUTION RESPIRATORY (INHALATION) at 12:09

## 2024-04-09 RX ADMIN — GUAIFENESIN 600 MG: 600 TABLET ORAL at 08:18

## 2024-04-09 RX ADMIN — IPRATROPIUM BROMIDE AND ALBUTEROL SULFATE 1 DOSE: 2.5; .5 SOLUTION RESPIRATORY (INHALATION) at 17:03

## 2024-04-09 RX ADMIN — DULOXETINE HYDROCHLORIDE 40 MG: 20 CAPSULE, DELAYED RELEASE ORAL at 08:18

## 2024-04-09 RX ADMIN — PANTOPRAZOLE SODIUM 40 MG: 40 TABLET, DELAYED RELEASE ORAL at 05:20

## 2024-04-09 ASSESSMENT — PAIN DESCRIPTION - DESCRIPTORS
DESCRIPTORS: ACHING
DESCRIPTORS: ACHING

## 2024-04-09 ASSESSMENT — PAIN DESCRIPTION - ORIENTATION
ORIENTATION: MID;LOWER
ORIENTATION: LOWER;MID

## 2024-04-09 ASSESSMENT — PAIN DESCRIPTION - LOCATION
LOCATION: BACK
LOCATION: BACK

## 2024-04-09 ASSESSMENT — PAIN SCALES - GENERAL
PAINLEVEL_OUTOF10: 2
PAINLEVEL_OUTOF10: 7
PAINLEVEL_OUTOF10: 5

## 2024-04-09 NOTE — PLAN OF CARE
Problem: Discharge Planning  Goal: Discharge to home or other facility with appropriate resources  Outcome: Progressing  Flowsheets (Taken 4/8/2024 2103)  Discharge to home or other facility with appropriate resources: Identify discharge learning needs (meds, wound care, etc)     Problem: Pain  Goal: Verbalizes/displays adequate comfort level or baseline comfort level  Outcome: Progressing     Problem: Respiratory - Adult  Goal: Achieves optimal ventilation and oxygenation  Outcome: Progressing  Flowsheets (Taken 4/8/2024 2103)  Achieves optimal ventilation and oxygenation:   Assess for changes in respiratory status   Assess for changes in mentation and behavior   Position to facilitate oxygenation and minimize respiratory effort   Assess and instruct to report shortness of breath or any respiratory difficulty     Problem: Cardiovascular - Adult  Goal: Maintains optimal cardiac output and hemodynamic stability  Outcome: Progressing  Flowsheets (Taken 4/8/2024 2103)  Maintains optimal cardiac output and hemodynamic stability: Monitor blood pressure and heart rate

## 2024-04-09 NOTE — PLAN OF CARE
Problem: Discharge Planning  Goal: Discharge to home or other facility with appropriate resources  4/9/2024 1000 by Nara Perez RN  Outcome: Progressing  4/9/2024 0602 by Tamara Steele RN  Outcome: Progressing  Flowsheets (Taken 4/8/2024 2103)  Discharge to home or other facility with appropriate resources: Identify discharge learning needs (meds, wound care, etc)     Problem: Pain  Goal: Verbalizes/displays adequate comfort level or baseline comfort level  4/9/2024 1000 by Nara Perez RN  Outcome: Progressing  4/9/2024 0602 by Tamara Steele RN  Outcome: Progressing     Problem: ABCDS Injury Assessment  Goal: Absence of physical injury  Outcome: Progressing     Problem: Respiratory - Adult  Goal: Achieves optimal ventilation and oxygenation  4/9/2024 1000 by Nara Perez RN  Outcome: Progressing  4/9/2024 0602 by Tamara Steele RN  Outcome: Progressing  Flowsheets (Taken 4/8/2024 2103)  Achieves optimal ventilation and oxygenation:   Assess for changes in respiratory status   Assess for changes in mentation and behavior   Position to facilitate oxygenation and minimize respiratory effort   Assess and instruct to report shortness of breath or any respiratory difficulty     Problem: Cardiovascular - Adult  Goal: Maintains optimal cardiac output and hemodynamic stability  4/9/2024 1000 by Nara Perez RN  Outcome: Progressing  4/9/2024 0602 by Tamara Steele RN  Outcome: Progressing  Flowsheets (Taken 4/8/2024 2103)  Maintains optimal cardiac output and hemodynamic stability: Monitor blood pressure and heart rate

## 2024-04-09 NOTE — CONSULTS
PATIENT IS SEEN AT THE REQUEST OF DR. Field for asthma exacerbation     HISTORY OF PRESENT ILLNESS:  This is a 39 y.o. female who presented to the ED on 4/7 with a CC of cough, chest pain and SOB.  Per ED notes started 3 days ago.  Admitted for asthma flare up.  Said her asthma is controlled.  Does flare up with allergies generally just on as needed inhalers.  Recently received a nebulizer but it is not helping her breathing.  She is coughing with some mucus.  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        PAST SURGICAL HISTORY:  Past Surgical History:   Procedure Laterality Date    APPENDECTOMY  12/14/2018     Laparoscopic appendectomy     CHOLECYSTECTOMY, LAPAROSCOPIC  2/07    LAPAROSCOPIC APPENDECTOMY N/A 12/14/2018    LAPAROSCOPIC APPENDECTOMY performed by Shola Ball MD at [facility] OR    OTHER SURGICAL HISTORY  07/18/2014    COLPOSCOPY OF VULVA, VAGINA AND CERVIX WITH CO-2 LASER       FAMILY HISTORY:  family history includes Asthma in her father; Breast Cancer in her maternal grandmother; Cancer in her maternal grandmother; Diabetes in her paternal grandmother; High Blood Pressure in her father and mother.    SOCIAL HISTORY:   reports that she has never smoked. She has never used smokeless tobacco.    Scheduled Meds:   metoprolol succinate  200 mg Oral Daily    insulin lispro  10 Units SubCUTAneous TID WC    ipratropium 0.5 mg-albuterol 2.5 mg  1 Dose Inhalation Q4H WA RT    mometasone-formoterol  2 puff Inhalation BID RT    benzonatate  100 mg Oral Once    DULoxetine  40 mg 
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     Social Hx: reviewed and updated as appropriate  Social History     Tobacco Use    Smoking status: Never    Smokeless tobacco: Never   Substance Use Topics    Alcohol use: Yes     Comment: rarely        Family hx: reviewed and updated as appropriate  family history includes Asthma in her father; Breast Cancer in her maternal grandmother; Cancer in her maternal grandmother; Diabetes in her paternal grandmother; High Blood Pressure in her father and mother.    Medications:   insulin glargine, 45 Units, Nightly  insulin lispro, 16 Units, TID WC  predniSONE, 20 mg, Daily  doxycycline hyclate, 100 mg, BID  isosorbide mononitrate, 30 mg, Daily  metoprolol succinate, 200 mg, Daily  ipratropium 0.5 mg-albuterol 2.5 mg, 1 Dose, Q4H WA RT  mometasone-formoterol, 2 puff, BID RT  guaiFENesin, 600 mg, BID  DULoxetine, 40 mg, Daily  pantoprazole, 40 mg, QAM AC  sodium chloride flush, 5-40 mL, 2 times per day  insulin lispro, 0-16 Units, TID WC  insulin lispro, 0-4 Units, Nightly  aspirin, 81 mg, Daily  amLODIPine, 10 mg, Daily  buPROPion, 300 mg, Daily  gabapentin, 800 mg, TID  enoxaparin, 30 mg, BID  [Held by provider] valsartan-hydroCHLOROthiazide, 1 tablet, Daily   And  [Held by provider] valsartan, 160 mg, Daily       Lisinopril and Augmentin [amoxicillin-pot clavulanate]    Allergies:   Allergies   Allergen Reactions    Lisinopril      cough    Augmentin [Amoxicillin-Pot Clavulanate] Rash         Physical Exam/Objective:

## 2024-04-10 VITALS
TEMPERATURE: 97.6 F | BODY MASS INDEX: 45.99 KG/M2 | HEIGHT: 67 IN | SYSTOLIC BLOOD PRESSURE: 143 MMHG | OXYGEN SATURATION: 98 % | DIASTOLIC BLOOD PRESSURE: 95 MMHG | HEART RATE: 92 BPM | WEIGHT: 293 LBS | RESPIRATION RATE: 18 BRPM

## 2024-04-10 LAB
ALBUMIN SERPL-MCNC: 3.5 G/DL (ref 3.4–5)
ANION GAP SERPL CALCULATED.3IONS-SCNC: 10 MMOL/L (ref 3–16)
BUN SERPL-MCNC: 36 MG/DL (ref 7–20)
CALCIUM SERPL-MCNC: 8.9 MG/DL (ref 8.3–10.6)
CHLORIDE SERPL-SCNC: 104 MMOL/L (ref 99–110)
CO2 SERPL-SCNC: 24 MMOL/L (ref 21–32)
CREAT SERPL-MCNC: 1.2 MG/DL (ref 0.6–1.1)
GFR SERPLBLD CREATININE-BSD FMLA CKD-EPI: 59 ML/MIN/{1.73_M2}
GLUCOSE BLD-MCNC: 127 MG/DL (ref 70–99)
GLUCOSE BLD-MCNC: 174 MG/DL (ref 70–99)
GLUCOSE SERPL-MCNC: 178 MG/DL (ref 70–99)
PERFORMED ON: ABNORMAL
PERFORMED ON: ABNORMAL
PHOSPHATE SERPL-MCNC: 3.3 MG/DL (ref 2.5–4.9)
POTASSIUM SERPL-SCNC: 4.3 MMOL/L (ref 3.5–5.1)
SODIUM SERPL-SCNC: 138 MMOL/L (ref 136–145)

## 2024-04-10 PROCEDURE — 36415 COLL VENOUS BLD VENIPUNCTURE: CPT

## 2024-04-10 PROCEDURE — 6370000000 HC RX 637 (ALT 250 FOR IP): Performed by: INTERNAL MEDICINE

## 2024-04-10 PROCEDURE — 6370000000 HC RX 637 (ALT 250 FOR IP): Performed by: STUDENT IN AN ORGANIZED HEALTH CARE EDUCATION/TRAINING PROGRAM

## 2024-04-10 PROCEDURE — 99232 SBSQ HOSP IP/OBS MODERATE 35: CPT | Performed by: NURSE PRACTITIONER

## 2024-04-10 PROCEDURE — 6370000000 HC RX 637 (ALT 250 FOR IP): Performed by: NURSE PRACTITIONER

## 2024-04-10 PROCEDURE — 94760 N-INVAS EAR/PLS OXIMETRY 1: CPT

## 2024-04-10 PROCEDURE — 6370000000 HC RX 637 (ALT 250 FOR IP): Performed by: HOSPITALIST

## 2024-04-10 PROCEDURE — 80069 RENAL FUNCTION PANEL: CPT

## 2024-04-10 PROCEDURE — 2580000003 HC RX 258: Performed by: HOSPITALIST

## 2024-04-10 PROCEDURE — 94640 AIRWAY INHALATION TREATMENT: CPT

## 2024-04-10 RX ORDER — DOXYCYCLINE HYCLATE 100 MG
100 TABLET ORAL 2 TIMES DAILY
Qty: 3 TABLET | Refills: 0 | Status: SHIPPED | OUTPATIENT
Start: 2024-04-10 | End: 2024-04-12

## 2024-04-10 RX ORDER — VALSARTAN AND HYDROCHLOROTHIAZIDE 160; 25 MG/1; MG/1
1 TABLET ORAL DAILY
Qty: 30 TABLET | Refills: 3 | Status: SHIPPED | OUTPATIENT
Start: 2024-04-10

## 2024-04-10 RX ORDER — PREDNISONE 20 MG/1
20 TABLET ORAL DAILY
Qty: 3 TABLET | Refills: 0 | Status: SHIPPED | OUTPATIENT
Start: 2024-04-11 | End: 2024-04-14

## 2024-04-10 RX ADMIN — METOPROLOL SUCCINATE 200 MG: 50 TABLET, EXTENDED RELEASE ORAL at 08:47

## 2024-04-10 RX ADMIN — DEXTROMETHORPHAN HYDROBROMIDE, GUAIFENESIN 5 ML: 10; 100 LIQUID ORAL at 13:41

## 2024-04-10 RX ADMIN — DULOXETINE HYDROCHLORIDE 40 MG: 20 CAPSULE, DELAYED RELEASE ORAL at 08:47

## 2024-04-10 RX ADMIN — INSULIN LISPRO 16 UNITS: 100 INJECTION, SOLUTION INTRAVENOUS; SUBCUTANEOUS at 08:47

## 2024-04-10 RX ADMIN — INSULIN LISPRO 16 UNITS: 100 INJECTION, SOLUTION INTRAVENOUS; SUBCUTANEOUS at 12:25

## 2024-04-10 RX ADMIN — SODIUM CHLORIDE, PRESERVATIVE FREE 10 ML: 5 INJECTION INTRAVENOUS at 05:42

## 2024-04-10 RX ADMIN — DOXYCYCLINE HYCLATE 100 MG: 100 TABLET, COATED ORAL at 08:47

## 2024-04-10 RX ADMIN — ASPIRIN 81 MG: 81 TABLET, CHEWABLE ORAL at 08:47

## 2024-04-10 RX ADMIN — TIZANIDINE 4 MG: 4 TABLET ORAL at 08:47

## 2024-04-10 RX ADMIN — GABAPENTIN 800 MG: 400 CAPSULE ORAL at 08:47

## 2024-04-10 RX ADMIN — GUAIFENESIN 600 MG: 600 TABLET ORAL at 08:47

## 2024-04-10 RX ADMIN — PREDNISONE 20 MG: 20 TABLET ORAL at 08:47

## 2024-04-10 RX ADMIN — IPRATROPIUM BROMIDE AND ALBUTEROL SULFATE 1 DOSE: 2.5; .5 SOLUTION RESPIRATORY (INHALATION) at 10:03

## 2024-04-10 RX ADMIN — MOMETASONE FUROATE AND FORMOTEROL FUMARATE DIHYDRATE 2 PUFF: 200; 5 AEROSOL RESPIRATORY (INHALATION) at 10:04

## 2024-04-10 RX ADMIN — BUPROPION HYDROCHLORIDE 300 MG: 150 TABLET, EXTENDED RELEASE ORAL at 08:47

## 2024-04-10 RX ADMIN — AMLODIPINE BESYLATE 10 MG: 10 TABLET ORAL at 08:47

## 2024-04-10 RX ADMIN — PANTOPRAZOLE SODIUM 40 MG: 40 TABLET, DELAYED RELEASE ORAL at 05:41

## 2024-04-10 RX ADMIN — IPRATROPIUM BROMIDE AND ALBUTEROL SULFATE 1 DOSE: 2.5; .5 SOLUTION RESPIRATORY (INHALATION) at 12:38

## 2024-04-10 RX ADMIN — DEXTROMETHORPHAN HYDROBROMIDE, GUAIFENESIN 5 ML: 10; 100 LIQUID ORAL at 08:57

## 2024-04-10 RX ADMIN — GABAPENTIN 800 MG: 400 CAPSULE ORAL at 13:41

## 2024-04-10 NOTE — PROGRESS NOTES
Patient ID: Cassie Brink  Referring/ Physician: Mandeep Avila MD      Summary:   Cassie Brink is being seen by nephrology for MARLA and HTN .     Reason for admission: asthma exacerbation     Interval Hx:   Seen at bedside.   Still has some congestion and SOB , no wheezing   No chest pain     /95  On room air     Cr 1.2   K 4.3     Assessment/Plan:   - stop amlodipine   - resume HCTZ valsartan  mg and metop 200 mg daily   - can discharge from my perspective.   - med rec updated.             MARLA  Baseline creatinine appears to be around 0.7-0.8  Her creatinine is 1.4 at time of consult  She has multiple risk factors for MARLA including uncontrolled hypertension, uncontrolled diabetes.  She has had some labile hypertension during this admission which can cause hemodynamic mediated kidney injury.  No echo on file.  Urinalysis showed some glucose urea 2 WBCs 1 RBC 2 hyaline casts and albumin creatinine ratio was 236 mg.  She already has some evidence of early diabetic nephropathy with the albuminuria and is at risk for CKD given her metabolic syndrome with hypertension, obesity and uncontrolled diabetes.      Uncontrolled hypertension  She is strong family history of hypertension and is also severely obese.  Will check a renin and aldosterone level though unlikely to have hyperaldosteronism or secondary cause of hypertension.  Suspect this is related to family history and severe obesity.  Prior to admission she was on valsartan-hydrochlorothiazide, metoprolol, amlodipine.  At time of consult patient is on amlodipine 10 mg daily Imdur 30 mg daily metoprolol  mg daily.    Insulin-dependent diabetes  Blood sugars been wildly uncontrolled in the hospital, she is on steroids which is exacerbating it      [facility] would like to thank you for the opportunity to serve this patient. Please call with any questions or concerns.    MD [facility]  [id number] 
4 Eyes Skin Assessment     NAME:  Cassie Brink  YOB: 1985  MEDICAL RECORD NUMBER:  [id number]    The patient is being assessed for  Admission    I agree that at least one RN has performed a thorough Head to Toe Skin Assessment on the patient. ALL assessment sites listed below have been assessed.      Areas assessed by both nurses:    Head, Face, Ears, Shoulders, Back, Chest, Arms, Elbows, Hands, Sacrum. Buttock, Coccyx, Ischium, Legs. Feet and Heels, and Under Medical Devices         Does the Patient have a Wound? No noted wound(s)       Joesph Prevention initiated by RN: No  Wound Care Orders initiated by RN: No    Pressure Injury (Stage 3,4, Unstageable, DTI, NWPT, and Complex wounds) if present, place Wound referral order by RN under : No    New Ostomies, if present place, Ostomy referral order under : No     Nurse 1 eSignature: Electronically signed by MARY REZA RN on 4/7/24 at 1:27 PM EDT    **SHARE this note so that the co-signing nurse can place an eSignature**    Nurse 2 eSignature: Electronically signed by Tamanna Gillespie RN on 4/7/24 at 4:30 PM EDT    
Patient Admitted. Patient is alert and oriented, blood pressure elevated 167/83, patient is on room air 99 %, ribs pain due to cough 4/10; productive cough; COVID/flu were negative. Answer all questions; denies any need at this time.patient resting in bed; bed at lowest position; call light within reach. Will continue to monitor.   
Patient alert and oriented X4. Patient vital signs recorded. Patient requested medicine for nausea. Followed PRN protocol for antinausea management. See MAR. Patients blood sugar 381 this evening. Secure message sent to Magnolia Morfin NP on patients situation. New orders placed. See MAR. Patient tolerated nightly medication well. Patient educated on blood sugar and diet. Patient verbalizes understanding of education. All needed items within reach.   
Patient requested to order door dash. Patient re-educated on blood sugar and diet. Patient ordered [place] chili but no bread or noodles. Patient resting in bed. All needed items within reach.   
Pharmacy Medication Reconciliation Note     List of medications patient is currently taking is complete.    Source of information:   1. Patient interview/  2. List of medications in Epic  3. Medication dispensing report in Epic        Notes regarding home medications:   1. Patient has taken no medications PTA.  2. Patient no longer taking: Byette, Naprosyn, Prilosec, valsartin, Flexeril  3. Added:   Amlodipine 10 mg daily   Wellbutrin  mg daily  Gabapentin 800 mg TID  lantus 30 units at bedtime  Toprol  mg daily  Valsartin/HCTZ 320/25 mg daily  Tizanidine 4 mg BID    GERDA HirschPh.  4/7/2024  2:52 PM   
Pt AOX4. BP and BS elevated. MD Mehrdad Hernandez notified.       Electronically signed by Gosia Marshall RN on 4/8/2024 at 8:04 AM    
Pt AOX4. VSS on room air. No further needs at this time. Call light within reach.     Electronically signed by Gosia Marshall RN on 4/9/2024 at 5:35 PM    
Pulmonary Progress Note    CC:  Follow up asthma    Subjective:    Room air   Feels worn out  Was coughing up more mucus which was yellowish       Intake/Output Summary (Last 24 hours) at 4/9/2024 0837  Last data filed at 4/9/2024 0513  Gross per 24 hour   Intake 1613.79 ml   Output --   Net 1613.79 ml         PHYSICAL EXAM:  Blood pressure (!) 167/113, pulse 98, temperature 97.4 °F (36.3 °C), temperature source Oral, resp. rate 20, height 1.702 m (5' 7\"), weight (!) 146.7 kg (323 lb 6.6 oz), last menstrual period 03/20/2024, SpO2 99 %, unknown if currently breastfeeding.'  Gen: No distress. Tired appearing  Eyes: PERRL. No sclera icterus. No conjunctival injection.   ENT: No discharge. Pharynx clear. External appearance of ears and nose normal.  Neck: Trachea midline. No obvious mass.    Resp: Essentially clear  CV: Regular rate. Regular rhythm. No murmur or rub.    GI: Non-tender. Non-distended. No hernia.   Skin: Warm, dry, normal texture and turgor. No nodule on exposed extremities.   Lymph: No cervical LAD. No supraclavicular LAD.   M/S: No cyanosis. No clubbing. No joint deformity.    Neuro: Moves all four extremities. CN 2-12 tested, no defect noted.  Ext:   no edema    Medications:    Scheduled Meds:   insulin glargine  45 Units SubCUTAneous Nightly    insulin lispro  16 Units SubCUTAneous TID     metoprolol succinate  200 mg Oral Daily    ipratropium 0.5 mg-albuterol 2.5 mg  1 Dose Inhalation Q4H WA RT    mometasone-formoterol  2 puff Inhalation BID RT    guaiFENesin  600 mg Oral BID    benzonatate  100 mg Oral Once    DULoxetine  40 mg Oral Daily    pantoprazole  40 mg Oral QAM AC    methylPREDNISolone  40 mg IntraVENous Q12H    doxycycline (VIBRAMYCIN) IV  100 mg IntraVENous Q12H    sodium chloride flush  5-40 mL IntraVENous 2 times per day    insulin lispro  0-16 Units SubCUTAneous TID WC    insulin lispro  0-4 Units SubCUTAneous Nightly    aspirin  81 mg Oral Daily    amLODIPine  10 mg Oral Daily    
Uneventful evening, pt requested meds for her cough  
AC    sodium chloride flush  5-40 mL IntraVENous 2 times per day    insulin lispro  0-16 Units SubCUTAneous TID WC    insulin lispro  0-4 Units SubCUTAneous Nightly    aspirin  81 mg Oral Daily    amLODIPine  10 mg Oral Daily    buPROPion  300 mg Oral Daily    gabapentin  800 mg Oral TID    enoxaparin  30 mg SubCUTAneous BID    [Held by provider] valsartan-hydroCHLOROthiazide  1 tablet Oral Daily    And    [Held by provider] valsartan  160 mg Oral Daily       Continuous Infusions:   sodium chloride      dextrose         PRN Meds:  guaiFENesin-dextromethorphan, sodium chloride flush, sodium chloride, potassium chloride **OR** potassium alternative oral replacement **OR** potassium chloride, magnesium sulfate, ondansetron **OR** ondansetron, polyethylene glycol, acetaminophen **OR** acetaminophen, glucose, dextrose bolus **OR** dextrose bolus, glucagon (rDNA), dextrose, ipratropium 0.5 mg-albuterol 2.5 mg    Labs:  CBC:   Recent Labs     04/08/24  0508 04/09/24  1055   WBC 9.2 9.5   HGB 11.2* 11.6*   HCT 33.6* 35.7*   MCV 85.0 85.2    345     BMP:   Recent Labs     04/08/24  0508 04/09/24  1016 04/09/24  1242   * 134*  --    K 4.9 5.4* 4.3   CL 99 98*  --    CO2 20* 20*  --    BUN 35* 45*  --    CREATININE 1.4* 1.3*  --      LIVER PROFILE: No results for input(s): \"AST\", \"ALT\", \"LIPASE\", \"AMYLASE\", \"ALB\", \"BILIDIR\", \"BILITOT\", \"ALKPHOS\" in the last 72 hours.  PT/INR: No results for input(s): \"PROTIME\", \"INR\" in the last 72 hours.  APTT: No results for input(s): \"APTT\" in the last 72 hours.  UA:  Recent Labs     04/09/24  1146   COLORU Yellow   PHUR 5.0   WBCUA 2   RBCUA 1   BACTERIA None Seen   CLARITYU Clear   SPECGRAV 1.015   LEUKOCYTESUR Negative   UROBILINOGEN 0.2   BILIRUBINUR Negative   BLOODU Negative   GLUCOSEU 250*     No results for input(s): \"PH\", \"PCO2\", \"PO2\" in the last 72 hours.    Films:  Chest imaging and associated reports were personally reviewed and showed CXR 4/7  FINDINGS:  Lungs: 
cbc      Diet ADULT DIET; Regular; 4 carb choices (60 gm/meal)   DVT Prophylaxis [x] Lovenox, []  Heparin, [] SCDs, [] Ambulation,  [] Eliquis, [] Xarelto  [] Coumadin   Code Status Full Code   Disposition From: home  Expected Disposition: home  Estimated Date of Discharge: tomorrow  Patient requires continued admission due to ECHO   Surrogate Decision Maker/ POA      Review of Systems:    Review of Systems    See above    Objective:   No intake or output data in the 24 hours ending 04/08/24 1210     Vitals:   Vitals:    04/08/24 1147   BP: (!) 155/95   Pulse: (!) 102   Resp: 18   Temp: 97.8 °F (36.6 °C)   SpO2: 100%       Physical Exam:     General: NAD  Eyes: EOMI  ENT: neck supple  Cardiovascular: Regular rate.  Respiratory: mild rales  Gastrointestinal: Soft, non tender  Genitourinary: no suprapubic tenderness  Musculoskeletal: bilateral trace edema  Skin: warm, dry  Neuro: Alert.  Psych: Mood appropriate.     Medications:   Medications:    metoprolol succinate  200 mg Oral Daily    insulin lispro  10 Units SubCUTAneous TID WC    ipratropium 0.5 mg-albuterol 2.5 mg  1 Dose Inhalation Q4H WA RT    mometasone-formoterol  2 puff Inhalation BID RT    guaiFENesin  600 mg Oral BID    benzonatate  100 mg Oral Once    DULoxetine  40 mg Oral Daily    pantoprazole  40 mg Oral QAM AC    methylPREDNISolone  40 mg IntraVENous Q12H    doxycycline (VIBRAMYCIN) IV  100 mg IntraVENous Q12H    sodium chloride flush  5-40 mL IntraVENous 2 times per day    insulin lispro  0-16 Units SubCUTAneous TID WC    insulin lispro  0-4 Units SubCUTAneous Nightly    aspirin  81 mg Oral Daily    amLODIPine  10 mg Oral Daily    buPROPion  300 mg Oral Daily    gabapentin  800 mg Oral TID    insulin glargine  30 Units SubCUTAneous Nightly    enoxaparin  30 mg SubCUTAneous BID    valsartan-hydroCHLOROthiazide  1 tablet Oral Daily    And    valsartan  160 mg Oral Daily      Infusions:    sodium chloride      dextrose       PRN Meds: 
Lymphocytes Absolute 1.0 1.0 - 5.1 K/uL    Monocytes Absolute 0.4 0.0 - 1.3 K/uL    Eosinophils Absolute 0.0 0.0 - 0.6 K/uL    Basophils Absolute 0.0 0.0 - 0.2 K/uL   POCT Glucose    Collection Time: 04/09/24 11:36 AM   Result Value Ref Range    POC Glucose 378 (H) 70 - 99 mg/dl    Performed on ACCU-CHEK    Microalbumin / Creatinine Urine Ratio    Collection Time: 04/09/24 11:46 AM   Result Value Ref Range    Microalbumin, Random Urine 24.20 (H) <2.0 mg/dL    Creatinine, Ur 102.2 28.0 - 259.0 mg/dL    Microalbumin Creatinine Ratio 236.8 (H) 0.0 - 30.0 mg/g   Urinalysis with Microscopic    Collection Time: 04/09/24 11:46 AM   Result Value Ref Range    Color, UA Yellow Straw/Yellow    Clarity, UA Clear Clear    Glucose, Ur 250 (A) Negative mg/dL    Bilirubin Urine Negative Negative    Ketones, Urine Negative Negative mg/dL    Specific Gravity, UA 1.015 1.005 - 1.030    Blood, Urine Negative Negative    pH, UA 5.0 5.0 - 8.0    Protein, UA 30 (A) Negative mg/dL    Urobilinogen, Urine 0.2 <2.0 E.U./dL    Nitrite, Urine Negative Negative    Leukocyte Esterase, Urine Negative Negative    Microscopic Examination YES     Urine Type NotGiven     Bacteria, UA None Seen None Seen /HPF    Hyaline Casts, UA 2 0 - 8 /LPF    WBC, UA 2 0 - 5 /HPF    RBC, UA 1 0 - 4 /HPF    Epithelial Cells, UA 2 0 - 5 /HPF   Culture, Respiratory    Collection Time: 04/09/24 12:17 PM    Specimen: Sputum Expectorated   Result Value Ref Range    Gram Stain Result       1+ Epithelial Cells  1+ WBC's (Polymorphonuclear)  2+ WBC's (Mononuclear)  3+ Gram positive cocci     Potassium    Collection Time: 04/09/24 12:42 PM   Result Value Ref Range    Potassium 4.3 3.5 - 5.1 mmol/L   POCT Glucose    Collection Time: 04/09/24  4:26 PM   Result Value Ref Range    POC Glucose 151 (H) 70 - 99 mg/dl    Performed on ACCU-CHEK    POCT Glucose    Collection Time: 04/09/24  9:17 PM   Result Value Ref Range    POC Glucose 240 (H) 70 - 99 mg/dl    Performed on ACCU-CHEK

## 2024-04-10 NOTE — DISCHARGE SUMMARY
Date/Time    TSH 2.21 12/22/2022 09:10 AM     Troponin: No results found for: \"TROPONINT\"  Lactic Acid: No results for input(s): \"LACTA\" in the last 72 hours.  BNP: No results for input(s): \"PROBNP\" in the last 72 hours.  UA:  Lab Results   Component Value Date/Time    NITRU Negative 04/09/2024 11:46 AM    COLORU Yellow 04/09/2024 11:46 AM    PHUR 5.0 04/09/2024 11:46 AM    WBCUA 2 04/09/2024 11:46 AM    RBCUA 1 04/09/2024 11:46 AM    YEAST Rare Yeast 02/28/2013 11:17 AM    BACTERIA None Seen 04/09/2024 11:46 AM    CLARITYU Clear 04/09/2024 11:46 AM    SPECGRAV 1.015 04/09/2024 11:46 AM    LEUKOCYTESUR Negative 04/09/2024 11:46 AM    UROBILINOGEN 0.2 04/09/2024 11:46 AM    BILIRUBINUR Negative 04/09/2024 11:46 AM    BILIRUBINUR neg 12/22/2022 09:18 AM    BILIRUBINUR Negative 04/06/2012 02:30 AM    BLOODU Negative 04/09/2024 11:46 AM    GLUCOSEU 250 04/09/2024 11:46 AM    GLUCOSEU 500 04/06/2012 02:30 AM    KETUA Negative 04/09/2024 11:46 AM    AMORPHOUS 3+ 07/22/2014 10:42 AM     Urine Cultures:   Lab Results   Component Value Date/Time    LABURIN  11/02/2021 05:11 PM     <10,000 CFU/ml mixed skin/urogenital aury. No further workup     Blood Cultures: No results found for: \"BC\"  No results found for: \"BLOODCULT2\"  Organism:   Lab Results   Component Value Date/Time    ORG Strep agalactiae (Beta Strep Group B) 04/09/2024 12:17 PM       Time Spent Discharging patient *** minutes    Electronically signed by Mandeep Avila MD on 4/10/2024 at 4:01 PM

## 2024-04-10 NOTE — PLAN OF CARE
Problem: Discharge Planning  Goal: Discharge to home or other facility with appropriate resources  Outcome: Progressing  Flowsheets (Taken 4/9/2024 2154)  Discharge to home or other facility with appropriate resources: Identify discharge learning needs (meds, wound care, etc)     Problem: Pain  Goal: Verbalizes/displays adequate comfort level or baseline comfort level  Outcome: Progressing     Problem: Respiratory - Adult  Goal: Achieves optimal ventilation and oxygenation  Outcome: Progressing  Flowsheets (Taken 4/9/2024 2154)  Achieves optimal ventilation and oxygenation: Assess for changes in respiratory status     Problem: Cardiovascular - Adult  Goal: Maintains optimal cardiac output and hemodynamic stability  Outcome: Progressing  Flowsheets (Taken 4/9/2024 2154)  Maintains optimal cardiac output and hemodynamic stability: Monitor blood pressure and heart rate

## 2024-04-10 NOTE — FLOWSHEET NOTE
Patient with discharge order. Patient IV  removed, patient given discharge paperwork and return to work note. Patient given opportunity to ask questions and perform teach back. Prescriptions sent to preferred pharmacy. Patient taken by wheelchair to ED entrance with all belongings. Patient discharge complete

## 2024-04-10 NOTE — PLAN OF CARE
Problem: Discharge Planning  Goal: Discharge to home or other facility with appropriate resources  4/10/2024 1630 by Fela Colmenares RN  Outcome: Completed     Problem: Pain  Goal: Verbalizes/displays adequate comfort level or baseline comfort level  4/10/2024 1630 by Fela Colmenares RN  Outcome: Completed     Problem: ABCDS Injury Assessment  Goal: Absence of physical injury  Outcome: Completed     Problem: Respiratory - Adult  Goal: Achieves optimal ventilation and oxygenation  4/10/2024 1630 by Fela Colmenares RN  Outcome: Completed     Problem: Cardiovascular - Adult  Goal: Maintains optimal cardiac output and hemodynamic stability  4/10/2024 1630 by Fela Colmenares RN  Outcome: Completed

## 2024-04-11 ENCOUNTER — TELEPHONE (OUTPATIENT)
Dept: INTERNAL MEDICINE CLINIC | Age: 39
End: 2024-04-11

## 2024-04-11 LAB
ALDOST SERPL-MCNC: 8.6 NG/DL
BACTERIA SPEC RESP CULT: ABNORMAL
BACTERIA SPEC RESP CULT: ABNORMAL
GRAM STN SPEC: ABNORMAL
ORGANISM: ABNORMAL
RENIN PLAS-CCNC: 0.7 NG/ML/HR

## 2024-04-11 NOTE — TELEPHONE ENCOUNTER
Care Transitions Initial Follow Up Call    Outreach made within 2 business days of discharge: Yes    Patient: Cassie Brink Patient : 1985   MRN: [id number]  Reason for Admission: There are no discharge diagnoses documented for the most recent discharge.  Discharge Date: 4/10/24         Scheduled appointment with PCP within 7-14 days    Follow Up  No future appointments.    Halina Tello LPN      Unable to reach pt or leave a VM: \"Subscriber not in service\"

## 2024-04-11 NOTE — TELEPHONE ENCOUNTER
Care Transitions Initial Follow Up Call    Outreach made within 2 business days of discharge: Yes    Patient: Cassie Brink Patient : 1985   MRN: [id number]  Reason for Admission: There are no discharge diagnoses documented for the most recent discharge.  Discharge Date: 4/10/24         Scheduled appointment with PCP within 7-14 days    Follow Up  No future appointments.      Not able to reach pt or leave a vm: \"Subscriber not in service\".

## 2024-04-12 ENCOUNTER — TELEPHONE (OUTPATIENT)
Dept: INTERNAL MEDICINE CLINIC | Age: 39
End: 2024-04-12

## 2024-04-12 LAB
A ALTERNATA IGE QN: 23.8 KU/L (ref 0–0.34)
A FUMIGATUS IGE QN: 4.02 KU/L (ref 0–0.34)
AMER SYCAMORE IGE QN: 0.15 KU/L (ref 0–0.34)
BERMUDA GRASS IGE QN: <0.1 KU/L (ref 0–0.34)
BOXELDER IGE QN: 0.11 KU/L (ref 0–0.34)
C SPHAEROSPERMUM IGE QN: 1.4 KUL/L (ref 0–0.34)
CALIF WALNUT IGE QN: 0.43 KU/L (ref 0–0.34)
CAT DANDER IGE QN: 7.83 KU/L (ref 0–0.34)
CMN PIGWEED IGE QN: <0.1 KU/L (ref 0–0.34)
COMMON RAGWEED IGE QN: 2.41 KU/L (ref 0–0.34)
COTTONWOOD IGE QN: 0.14 KU/L (ref 0–0.34)
D FARINAE IGE QN: 0.11 KU/L (ref 0–0.34)
D PTERONYSS IGE QN: 0.11 KU/L (ref 0–0.34)
DOG DANDER IGE QN: 46.8 KU/L (ref 0–0.34)
IGE SERPL-ACNC: 1065 IU/ML (ref 0–100)
M RACEMOSUS IGE QN: <0.1 KU/L (ref 0–0.34)
MOUSE EPITH IGE QN: <0.1 KU/L (ref 0–0.34)
P NOTATUM IGE QN: 0.14 KU/L (ref 0–0.34)
PECAN/HICK TREE IGE QN: 0.12 KU/L (ref 0–0.34)
RED CEDAR IGE QN: 4.67 KU/L (ref 0–0.34)
ROACH IGE QN: 5.31 KU/L (ref 0–0.34)
SALTWORT IGE QN: <0.1 KU/L (ref 0–0.34)
SHEEP SORREL IGE QN: <0.1 KU/L (ref 0–0.34)
SILVER BIRCH IGE QN: <0.1 KU/L (ref 0–0.34)
TIMOTHY IGE QN: 0.63 KU/L (ref 0–0.34)
WHITE ASH IGE QN: 0.36 KU/L (ref 0–0.34)
WHITE ELM IGE QN: 0.43 KU/L (ref 0–0.34)
WHITE MULBERRY IGE QN: <0.1 KU/L (ref 0–0.34)
WHITE OAK IGE QN: <0.1 KU/L (ref 0–0.34)

## 2024-04-12 NOTE — TELEPHONE ENCOUNTER
Care Transitions Initial Follow Up Call    Outreach made within 2 business days of discharge: Yes    Patient: Cassie Brink Patient : 1985   MRN: [id number]  Reason for Admission: There are no discharge diagnoses documented for the most recent discharge.  Discharge Date: 4/10/24      Scheduled appointment with PCP within 7-14 days    Follow Up  No future appointments.    Halina Tello LPN      Pt was called. Number not in service.

## 2024-11-18 ENCOUNTER — OFFICE VISIT (OUTPATIENT)
Dept: INTERNAL MEDICINE CLINIC | Age: 39
End: 2024-11-18

## 2024-11-18 VITALS
BODY MASS INDEX: 45.99 KG/M2 | SYSTOLIC BLOOD PRESSURE: 128 MMHG | HEART RATE: 91 BPM | DIASTOLIC BLOOD PRESSURE: 86 MMHG | WEIGHT: 293 LBS | OXYGEN SATURATION: 99 % | HEIGHT: 67 IN

## 2024-11-18 DIAGNOSIS — I10 ESSENTIAL HYPERTENSION: ICD-10-CM

## 2024-11-18 DIAGNOSIS — Z79.4 TYPE 2 DIABETES MELLITUS WITHOUT COMPLICATION, WITH LONG-TERM CURRENT USE OF INSULIN (HCC): ICD-10-CM

## 2024-11-18 DIAGNOSIS — E11.9 TYPE 2 DIABETES MELLITUS WITHOUT COMPLICATION, WITH LONG-TERM CURRENT USE OF INSULIN (HCC): ICD-10-CM

## 2024-11-18 DIAGNOSIS — Z00.00 PREVENTATIVE HEALTH CARE: Primary | ICD-10-CM

## 2024-11-18 DIAGNOSIS — E66.9 OBESITY, DIABETES, AND HYPERTENSION SYNDROME (HCC): ICD-10-CM

## 2024-11-18 DIAGNOSIS — E11.65 UNCONTROLLED TYPE 2 DIABETES MELLITUS WITH HYPERGLYCEMIA (HCC): ICD-10-CM

## 2024-11-18 DIAGNOSIS — I15.2 OBESITY, DIABETES, AND HYPERTENSION SYNDROME (HCC): ICD-10-CM

## 2024-11-18 DIAGNOSIS — E11.59 OBESITY, DIABETES, AND HYPERTENSION SYNDROME (HCC): ICD-10-CM

## 2024-11-18 DIAGNOSIS — E11.69 OBESITY, DIABETES, AND HYPERTENSION SYNDROME (HCC): ICD-10-CM

## 2024-11-18 DIAGNOSIS — Z13.220 SCREENING FOR CHOLESTEROL LEVEL: ICD-10-CM

## 2024-11-18 PROBLEM — J06.9 VIRAL URI: Status: RESOLVED | Noted: 2024-04-08 | Resolved: 2024-11-18

## 2024-11-18 LAB — HBA1C MFR BLD: 12.2 %

## 2024-11-18 RX ORDER — GABAPENTIN 400 MG/1
800 CAPSULE ORAL 3 TIMES DAILY
Qty: 90 CAPSULE | Refills: 2 | Status: SHIPPED | OUTPATIENT
Start: 2024-11-18 | End: 2024-12-18

## 2024-11-18 RX ORDER — AMLODIPINE BESYLATE 10 MG/1
10 TABLET ORAL DAILY
Qty: 30 TABLET | Refills: 2 | Status: SHIPPED | OUTPATIENT
Start: 2024-11-18

## 2024-11-18 RX ORDER — INSULIN GLARGINE 100 [IU]/ML
36 INJECTION, SOLUTION SUBCUTANEOUS NIGHTLY
Qty: 5 ADJUSTABLE DOSE PRE-FILLED PEN SYRINGE | Refills: 0 | Status: SHIPPED | OUTPATIENT
Start: 2024-11-18

## 2024-11-18 RX ORDER — HYDROCHLOROTHIAZIDE 12.5 MG/1
1 CAPSULE ORAL
Qty: 6 EACH | Refills: 2 | Status: SHIPPED | OUTPATIENT
Start: 2024-11-18

## 2024-11-18 RX ORDER — INSULIN ASPART 100 [IU]/ML
INJECTION, SOLUTION INTRAVENOUS; SUBCUTANEOUS
Qty: 5 ADJUSTABLE DOSE PRE-FILLED PEN SYRINGE | Refills: 2 | Status: SHIPPED | OUTPATIENT
Start: 2024-11-18

## 2024-11-18 RX ORDER — VALSARTAN AND HYDROCHLOROTHIAZIDE 160; 25 MG/1; MG/1
1 TABLET ORAL DAILY
Qty: 30 TABLET | Refills: 3 | Status: SHIPPED | OUTPATIENT
Start: 2024-11-18

## 2024-11-18 ASSESSMENT — ENCOUNTER SYMPTOMS
WHEEZING: 0
COUGH: 0
CONSTIPATION: 0
ABDOMINAL PAIN: 0
SHORTNESS OF BREATH: 0
CHEST TIGHTNESS: 0
SINUS PAIN: 0
BLOOD IN STOOL: 0
COLOR CHANGE: 0

## 2024-11-18 NOTE — PROGRESS NOTES
Cassie Brink (:  1985) is a 39 y.o. female,Established patient, here for evaluation of the following chief complaint(s):  Annual Exam      Assessment & Plan   ASSESSMENT/PLAN:  1. Preventative health care  -     TSH; Future  -     CBC with Auto Differential; Future  -     Hemoglobin A1C; Future  -     Comprehensive Metabolic Panel; Future  2. Uncontrolled type 2 diabetes mellitus with hyperglycemia (HCC)  -     POCT glycosylated hemoglobin (Hb A1C)  -     Continuous Glucose Sensor (FREESTYLE SANDRA 3 PLUS SENSOR) MISC; 1 each by Does not apply route every 14 days, Disp-6 each, R-2Normal  -     gabapentin (NEURONTIN) 400 MG capsule; Take 2 capsules by mouth 3 times daily for 30 days., Disp-90 capsule, R-2Normal  -     TSH; Future  3. Obesity, diabetes, and hypertension syndrome (HCC)  -     insulin glargine (LANTUS SOLOSTAR) 100 UNIT/ML injection pen; Inject 36 Units into the skin nightly, Disp-5 Adjustable Dose Pre-filled Pen Syringe, R-0Normal  4. Essential hypertension  -     amLODIPine (NORVASC) 10 MG tablet; Take 1 tablet by mouth daily, Disp-30 tablet, R-2Normal  5. Type 2 diabetes mellitus without complication, with long-term current use of insulin (HCC)  -     insulin aspart (NOVOLOG FLEXPEN) 100 UNIT/ML injection pen; 28 units at breakfast; 28 at dinner., Disp-5 Adjustable Dose Pre-filled Pen Syringe, R-2Normal  6. Screening for cholesterol level  -     Lipid Panel; Future  Unfortunately patient compliance has been a big issue she has not been taking her medication regularly as her A1c is significantly elevated we talked about the proper way of doing her insulin her readings are quite high with an increase by 10 to 20% her check her readings for the next 4 weeks to 6 weeks and then reassess the entire treatment plan    Patient blood pressure was elevated initial presentation after resting it did improve but continues to medication refill her prescriptions    The patient has been enrolled with the

## 2024-12-19 ENCOUNTER — HOSPITAL ENCOUNTER (OUTPATIENT)
Age: 39
Discharge: HOME | End: 2024-12-19
Payer: MEDICAID

## 2024-12-19 ENCOUNTER — HOSPITAL ENCOUNTER (OUTPATIENT)
Dept: GENERAL RADIOLOGY | Age: 39
Discharge: HOME | End: 2024-12-19
Payer: MEDICAID

## 2024-12-19 DIAGNOSIS — R52 PAIN: ICD-10-CM

## 2024-12-19 PROCEDURE — 73564 X-RAY EXAM KNEE 4 OR MORE: CPT

## 2024-12-19 PROCEDURE — 72100 X-RAY EXAM L-S SPINE 2/3 VWS: CPT

## 2025-02-23 ENCOUNTER — HOSPITAL ENCOUNTER (INPATIENT)
Age: 40
LOS: 2 days | Discharge: HOME | DRG: 199 | End: 2025-02-25
Payer: MEDICAID

## 2025-02-23 ENCOUNTER — APPOINTMENT (OUTPATIENT)
Dept: GENERAL RADIOLOGY | Age: 40
DRG: 199 | End: 2025-02-23
Payer: MEDICAID

## 2025-02-23 DIAGNOSIS — R07.9 CHEST PAIN, UNSPECIFIED TYPE: Primary | ICD-10-CM

## 2025-02-23 DIAGNOSIS — I20.9 ANGINA PECTORIS: ICD-10-CM

## 2025-02-23 DIAGNOSIS — I10 UNCONTROLLED HYPERTENSION: ICD-10-CM

## 2025-02-23 LAB
ALBUMIN SERPL-MCNC: 3.3 G/DL (ref 3.4–5)
ALBUMIN/GLOB SERPL: 1.1 {RATIO} (ref 1.1–2.2)
ALP SERPL-CCNC: 95 U/L (ref 40–129)
ALT SERPL-CCNC: 9 U/L (ref 10–40)
ANION GAP SERPL CALCULATED.3IONS-SCNC: 10 MMOL/L (ref 3–16)
AST SERPL-CCNC: 14 U/L (ref 15–37)
B-HCG SERPL EIA 3RD IS-ACNC: <5 MIU/ML
BASOPHILS # BLD: 0.1 K/UL (ref 0–0.2)
BASOPHILS NFR BLD: 1.4 %
BILIRUB SERPL-MCNC: <0.2 MG/DL (ref 0–1)
BUN SERPL-MCNC: 22 MG/DL (ref 7–20)
CALCIUM SERPL-MCNC: 9 MG/DL (ref 8.3–10.6)
CHLORIDE SERPL-SCNC: 106 MMOL/L (ref 99–110)
CO2 SERPL-SCNC: 21 MMOL/L (ref 21–32)
CREAT SERPL-MCNC: 1.2 MG/DL (ref 0.6–1.1)
D-DIMER QUANTITATIVE: 0.39 UG/ML FEU (ref 0–0.6)
DEPRECATED RDW RBC AUTO: 13.5 % (ref 12.4–15.4)
EOSINOPHIL # BLD: 0.1 K/UL (ref 0–0.6)
EOSINOPHIL NFR BLD: 1.9 %
GFR SERPLBLD CREATININE-BSD FMLA CKD-EPI: 59 ML/MIN/{1.73_M2}
GLUCOSE BLD-MCNC: 252 MG/DL (ref 70–99)
GLUCOSE BLD-MCNC: 311 MG/DL (ref 70–99)
GLUCOSE SERPL-MCNC: 286 MG/DL (ref 70–99)
HCG SERPL QL: NEGATIVE
HCT VFR BLD AUTO: 36.5 % (ref 36–48)
HGB BLD-MCNC: 12.2 G/DL (ref 12–16)
LYMPHOCYTES # BLD: 1.9 K/UL (ref 1–5.1)
LYMPHOCYTES NFR BLD: 34.4 %
MCH RBC QN AUTO: 28.5 PG (ref 26–34)
MCHC RBC AUTO-ENTMCNC: 33.3 G/DL (ref 31–36)
MCV RBC AUTO: 85.5 FL (ref 80–100)
MONOCYTES # BLD: 0.4 K/UL (ref 0–1.3)
MONOCYTES NFR BLD: 7.6 %
NEUTROPHILS # BLD: 3 K/UL (ref 1.7–7.7)
NEUTROPHILS NFR BLD: 54.7 %
PERFORMED ON: ABNORMAL
PERFORMED ON: ABNORMAL
PLATELET # BLD AUTO: 274 K/UL (ref 135–450)
PMV BLD AUTO: 8.8 FL (ref 5–10.5)
POTASSIUM SERPL-SCNC: 4.5 MMOL/L (ref 3.5–5.1)
PROT SERPL-MCNC: 6.3 G/DL (ref 6.4–8.2)
RBC # BLD AUTO: 4.27 M/UL (ref 4–5.2)
SODIUM SERPL-SCNC: 137 MMOL/L (ref 136–145)
TROPONIN, HIGH SENSITIVITY: 17 NG/L (ref 0–14)
TROPONIN, HIGH SENSITIVITY: 19 NG/L (ref 0–14)
WBC # BLD AUTO: 5.5 K/UL (ref 4–11)

## 2025-02-23 PROCEDURE — 2500000003 HC RX 250 WO HCPCS

## 2025-02-23 PROCEDURE — 84484 ASSAY OF TROPONIN QUANT: CPT

## 2025-02-23 PROCEDURE — 71045 X-RAY EXAM CHEST 1 VIEW: CPT

## 2025-02-23 PROCEDURE — 6360000002 HC RX W HCPCS: Performed by: PHYSICIAN ASSISTANT

## 2025-02-23 PROCEDURE — 84703 CHORIONIC GONADOTROPIN ASSAY: CPT

## 2025-02-23 PROCEDURE — 85379 FIBRIN DEGRADATION QUANT: CPT

## 2025-02-23 PROCEDURE — 99285 EMERGENCY DEPT VISIT HI MDM: CPT

## 2025-02-23 PROCEDURE — 6370000000 HC RX 637 (ALT 250 FOR IP)

## 2025-02-23 PROCEDURE — 6360000002 HC RX W HCPCS

## 2025-02-23 PROCEDURE — 93005 ELECTROCARDIOGRAM TRACING: CPT

## 2025-02-23 PROCEDURE — 84702 CHORIONIC GONADOTROPIN TEST: CPT

## 2025-02-23 PROCEDURE — 85025 COMPLETE CBC W/AUTO DIFF WBC: CPT

## 2025-02-23 PROCEDURE — 80053 COMPREHEN METABOLIC PANEL: CPT

## 2025-02-23 PROCEDURE — 36415 COLL VENOUS BLD VENIPUNCTURE: CPT

## 2025-02-23 PROCEDURE — 96374 THER/PROPH/DIAG INJ IV PUSH: CPT

## 2025-02-23 PROCEDURE — 6370000000 HC RX 637 (ALT 250 FOR IP): Performed by: PHYSICIAN ASSISTANT

## 2025-02-23 PROCEDURE — 96375 TX/PRO/DX INJ NEW DRUG ADDON: CPT

## 2025-02-23 PROCEDURE — 1200000000 HC SEMI PRIVATE

## 2025-02-23 RX ORDER — ONDANSETRON 2 MG/ML
4 INJECTION INTRAMUSCULAR; INTRAVENOUS EVERY 6 HOURS PRN
Status: DISCONTINUED | OUTPATIENT
Start: 2025-02-23 | End: 2025-02-25 | Stop reason: HOSPADM

## 2025-02-23 RX ORDER — LABETALOL HYDROCHLORIDE 5 MG/ML
10 INJECTION, SOLUTION INTRAVENOUS EVERY 6 HOURS PRN
Status: DISCONTINUED | OUTPATIENT
Start: 2025-02-23 | End: 2025-02-25 | Stop reason: HOSPADM

## 2025-02-23 RX ORDER — KETOROLAC TROMETHAMINE 30 MG/ML
30 INJECTION, SOLUTION INTRAMUSCULAR; INTRAVENOUS ONCE
Status: COMPLETED | OUTPATIENT
Start: 2025-02-23 | End: 2025-02-23

## 2025-02-23 RX ORDER — POTASSIUM CHLORIDE 7.45 MG/ML
10 INJECTION INTRAVENOUS PRN
Status: DISCONTINUED | OUTPATIENT
Start: 2025-02-23 | End: 2025-02-25 | Stop reason: HOSPADM

## 2025-02-23 RX ORDER — MAGNESIUM SULFATE IN WATER 40 MG/ML
2000 INJECTION, SOLUTION INTRAVENOUS PRN
Status: DISCONTINUED | OUTPATIENT
Start: 2025-02-23 | End: 2025-02-25 | Stop reason: HOSPADM

## 2025-02-23 RX ORDER — SODIUM CHLORIDE 0.9 % (FLUSH) 0.9 %
5-40 SYRINGE (ML) INJECTION PRN
Status: DISCONTINUED | OUTPATIENT
Start: 2025-02-23 | End: 2025-02-25 | Stop reason: HOSPADM

## 2025-02-23 RX ORDER — METOPROLOL SUCCINATE 50 MG/1
200 TABLET, EXTENDED RELEASE ORAL DAILY
Status: DISCONTINUED | OUTPATIENT
Start: 2025-02-24 | End: 2025-02-25 | Stop reason: HOSPADM

## 2025-02-23 RX ORDER — GABAPENTIN 800 MG/1
800 TABLET ORAL 3 TIMES DAILY
COMMUNITY

## 2025-02-23 RX ORDER — SODIUM CHLORIDE 0.9 % (FLUSH) 0.9 %
5-40 SYRINGE (ML) INJECTION EVERY 12 HOURS SCHEDULED
Status: DISCONTINUED | OUTPATIENT
Start: 2025-02-23 | End: 2025-02-25 | Stop reason: HOSPADM

## 2025-02-23 RX ORDER — DULAGLUTIDE 0.75 MG/.5ML
0.75 INJECTION, SOLUTION SUBCUTANEOUS WEEKLY
COMMUNITY
Start: 2025-01-27

## 2025-02-23 RX ORDER — IBUPROFEN 200 MG
400 TABLET ORAL EVERY 6 HOURS PRN
COMMUNITY

## 2025-02-23 RX ORDER — INSULIN LISPRO 100 [IU]/ML
28 INJECTION, SOLUTION INTRAVENOUS; SUBCUTANEOUS 2 TIMES DAILY WITH MEALS
Status: DISCONTINUED | OUTPATIENT
Start: 2025-02-23 | End: 2025-02-25 | Stop reason: HOSPADM

## 2025-02-23 RX ORDER — ACETAMINOPHEN 325 MG/1
650 TABLET ORAL EVERY 6 HOURS PRN
COMMUNITY

## 2025-02-23 RX ORDER — ASPIRIN 325 MG
325 TABLET ORAL ONCE
Status: COMPLETED | OUTPATIENT
Start: 2025-02-23 | End: 2025-02-23

## 2025-02-23 RX ORDER — AMLODIPINE BESYLATE 10 MG/1
10 TABLET ORAL DAILY
Status: DISCONTINUED | OUTPATIENT
Start: 2025-02-24 | End: 2025-02-25 | Stop reason: HOSPADM

## 2025-02-23 RX ORDER — BUPROPION HYDROCHLORIDE 150 MG/1
300 TABLET ORAL NIGHTLY
Status: DISCONTINUED | OUTPATIENT
Start: 2025-02-23 | End: 2025-02-25 | Stop reason: HOSPADM

## 2025-02-23 RX ORDER — ACETAMINOPHEN 325 MG/1
650 TABLET ORAL EVERY 6 HOURS PRN
Status: DISCONTINUED | OUTPATIENT
Start: 2025-02-23 | End: 2025-02-25 | Stop reason: HOSPADM

## 2025-02-23 RX ORDER — POTASSIUM CHLORIDE 1500 MG/1
40 TABLET, EXTENDED RELEASE ORAL PRN
Status: DISCONTINUED | OUTPATIENT
Start: 2025-02-23 | End: 2025-02-25 | Stop reason: HOSPADM

## 2025-02-23 RX ORDER — INSULIN GLARGINE 100 [IU]/ML
36 INJECTION, SOLUTION SUBCUTANEOUS NIGHTLY
Status: DISCONTINUED | OUTPATIENT
Start: 2025-02-23 | End: 2025-02-25 | Stop reason: HOSPADM

## 2025-02-23 RX ORDER — ACETAMINOPHEN 650 MG/1
650 SUPPOSITORY RECTAL EVERY 6 HOURS PRN
Status: DISCONTINUED | OUTPATIENT
Start: 2025-02-23 | End: 2025-02-25 | Stop reason: HOSPADM

## 2025-02-23 RX ORDER — HYDROXYZINE PAMOATE 25 MG/1
50 CAPSULE ORAL ONCE
Status: COMPLETED | OUTPATIENT
Start: 2025-02-23 | End: 2025-02-23

## 2025-02-23 RX ORDER — DULOXETIN HYDROCHLORIDE 20 MG/1
40 CAPSULE, DELAYED RELEASE ORAL NIGHTLY
Status: DISCONTINUED | OUTPATIENT
Start: 2025-02-23 | End: 2025-02-25 | Stop reason: HOSPADM

## 2025-02-23 RX ORDER — REGADENOSON 0.08 MG/ML
0.4 INJECTION, SOLUTION INTRAVENOUS
Status: COMPLETED | OUTPATIENT
Start: 2025-02-23 | End: 2025-02-24

## 2025-02-23 RX ORDER — POLYETHYLENE GLYCOL 3350 17 G/17G
17 POWDER, FOR SOLUTION ORAL DAILY PRN
Status: DISCONTINUED | OUTPATIENT
Start: 2025-02-23 | End: 2025-02-25 | Stop reason: HOSPADM

## 2025-02-23 RX ORDER — AMLODIPINE BESYLATE 5 MG/1
10 TABLET ORAL DAILY
Status: DISCONTINUED | OUTPATIENT
Start: 2025-02-23 | End: 2025-02-23

## 2025-02-23 RX ORDER — MORPHINE SULFATE 2 MG/ML
2 INJECTION, SOLUTION INTRAMUSCULAR; INTRAVENOUS ONCE
Status: COMPLETED | OUTPATIENT
Start: 2025-02-23 | End: 2025-02-23

## 2025-02-23 RX ORDER — ALBUTEROL SULFATE 0.83 MG/ML
2.5 SOLUTION RESPIRATORY (INHALATION) EVERY 6 HOURS PRN
Status: DISCONTINUED | OUTPATIENT
Start: 2025-02-23 | End: 2025-02-25 | Stop reason: HOSPADM

## 2025-02-23 RX ORDER — ONDANSETRON 4 MG/1
4 TABLET, ORALLY DISINTEGRATING ORAL EVERY 8 HOURS PRN
Status: DISCONTINUED | OUTPATIENT
Start: 2025-02-23 | End: 2025-02-25 | Stop reason: HOSPADM

## 2025-02-23 RX ORDER — GABAPENTIN 400 MG/1
800 CAPSULE ORAL 3 TIMES DAILY
Status: DISCONTINUED | OUTPATIENT
Start: 2025-02-23 | End: 2025-02-25 | Stop reason: HOSPADM

## 2025-02-23 RX ORDER — ENOXAPARIN SODIUM 100 MG/ML
30 INJECTION SUBCUTANEOUS 2 TIMES DAILY
Status: DISCONTINUED | OUTPATIENT
Start: 2025-02-23 | End: 2025-02-25 | Stop reason: HOSPADM

## 2025-02-23 RX ORDER — SODIUM CHLORIDE 9 MG/ML
INJECTION, SOLUTION INTRAVENOUS PRN
Status: DISCONTINUED | OUTPATIENT
Start: 2025-02-23 | End: 2025-02-25 | Stop reason: HOSPADM

## 2025-02-23 RX ADMIN — KETOROLAC TROMETHAMINE 30 MG: 30 INJECTION, SOLUTION INTRAMUSCULAR at 15:46

## 2025-02-23 RX ADMIN — GABAPENTIN 800 MG: 400 CAPSULE ORAL at 18:31

## 2025-02-23 RX ADMIN — TIZANIDINE 4 MG: 4 TABLET ORAL at 20:38

## 2025-02-23 RX ADMIN — SODIUM CHLORIDE, PRESERVATIVE FREE 10 ML: 5 INJECTION INTRAVENOUS at 20:40

## 2025-02-23 RX ADMIN — ASPIRIN 325 MG: 325 TABLET ORAL at 13:56

## 2025-02-23 RX ADMIN — HYDROXYZINE PAMOATE 50 MG: 25 CAPSULE ORAL at 13:56

## 2025-02-23 RX ADMIN — GABAPENTIN 800 MG: 400 CAPSULE ORAL at 20:38

## 2025-02-23 RX ADMIN — DULOXETINE 40 MG: 20 CAPSULE, DELAYED RELEASE ORAL at 20:38

## 2025-02-23 RX ADMIN — INSULIN LISPRO 28 UNITS: 100 INJECTION, SOLUTION INTRAVENOUS; SUBCUTANEOUS at 18:31

## 2025-02-23 RX ADMIN — BUPROPION HYDROCHLORIDE 300 MG: 150 TABLET, EXTENDED RELEASE ORAL at 20:38

## 2025-02-23 RX ADMIN — INSULIN GLARGINE 36 UNITS: 100 INJECTION, SOLUTION SUBCUTANEOUS at 20:40

## 2025-02-23 RX ADMIN — LIDOCAINE HYDROCHLORIDE: 20 SOLUTION ORAL; TOPICAL at 14:10

## 2025-02-23 RX ADMIN — AMLODIPINE BESYLATE 10 MG: 5 TABLET ORAL at 15:12

## 2025-02-23 RX ADMIN — MORPHINE SULFATE 2 MG: 2 INJECTION, SOLUTION INTRAMUSCULAR; INTRAVENOUS at 20:38

## 2025-02-23 RX ADMIN — MORPHINE SULFATE 2 MG: 2 INJECTION, SOLUTION INTRAMUSCULAR; INTRAVENOUS at 13:56

## 2025-02-23 ASSESSMENT — PAIN - FUNCTIONAL ASSESSMENT
PAIN_FUNCTIONAL_ASSESSMENT: ACTIVITIES ARE NOT PREVENTED
PAIN_FUNCTIONAL_ASSESSMENT: 0-10

## 2025-02-23 ASSESSMENT — PAIN DESCRIPTION - DESCRIPTORS
DESCRIPTORS: STABBING
DESCRIPTORS: ACHING
DESCRIPTORS: ACHING;DISCOMFORT

## 2025-02-23 ASSESSMENT — PAIN DESCRIPTION - LOCATION
LOCATION: ARM
LOCATION: SHOULDER
LOCATION: CHEST;BACK

## 2025-02-23 ASSESSMENT — HEART SCORE
ECG: NORMAL
ECG: NORMAL

## 2025-02-23 ASSESSMENT — PAIN DESCRIPTION - ORIENTATION
ORIENTATION: LEFT
ORIENTATION: MID
ORIENTATION: LEFT

## 2025-02-23 ASSESSMENT — PAIN DESCRIPTION - ONSET: ONSET: ON-GOING

## 2025-02-23 ASSESSMENT — PAIN DESCRIPTION - PAIN TYPE
TYPE: ACUTE PAIN
TYPE: ACUTE PAIN

## 2025-02-23 ASSESSMENT — PAIN SCALES - GENERAL
PAINLEVEL_OUTOF10: 5
PAINLEVEL_OUTOF10: 7
PAINLEVEL_OUTOF10: 6
PAINLEVEL_OUTOF10: 8

## 2025-02-23 ASSESSMENT — PAIN DESCRIPTION - FREQUENCY: FREQUENCY: INTERMITTENT

## 2025-02-23 ASSESSMENT — PAIN DESCRIPTION - DIRECTION: RADIATING_TOWARDS: ARM

## 2025-02-23 NOTE — ED PROVIDER NOTES
[facility] EMERGENCY DEPARTMENT  EMERGENCY DEPARTMENT ENCOUNTER        Pt Name: Cassie Brink  MRN: [id number]  Birthdate 1985  Date of evaluation: 2/23/2025  Provider: PEPE Diaz  PCP: Millicent Cortés MD  Note Started: 2:01 PM EST 2/23/25      WENDIE. I have evaluated this patient.        CHIEF COMPLAINT       Chief Complaint   Patient presents with    Chest Pain     Chest pain since yesterday evening. Patient also reports associated dizziness, arm pain, jaw pain and pain in her back between her shoulders       HISTORY OF PRESENT ILLNESS: 1 or more Elements     History From: Patient  Limitations to history : None    Cassie Brink is a 40 y.o. female who presents to the emergency room due to substernal chest pain that started yesterday.  Patient states initially started off as a burning type sensation within the center of her chest.  She does have a history of acid reflux disease.  She states today the chest pain continues and she is also complaining of pain that radiates to her back and also left arm pain.  She arrives here very anxious.  She states that she has a history of high blood pressure but no cardiac history.    Nursing Notes were all reviewed and agreed with or any disagreements were addressed in the HPI.    REVIEW OF SYSTEMS :      Review of Systems   Cardiovascular:  Positive for chest pain.       Positives and Pertinent negatives as per HPI.     SURGICAL HISTORY     Past Surgical History:   Procedure Laterality Date    APPENDECTOMY  12/14/2018     Laparoscopic appendectomy     CHOLECYSTECTOMY, LAPAROSCOPIC  2/07    LAPAROSCOPIC APPENDECTOMY N/A 12/14/2018    LAPAROSCOPIC APPENDECTOMY performed by Shola Ball MD at [facility] OR    OTHER SURGICAL HISTORY  07/18/2014    COLPOSCOPY OF VULVA, VAGINA AND CERVIX WITH CO-2 LASER       CURRENTMEDICATIONS       Previous Medications    ALBUTEROL SULFATE HFA (VENTOLIN HFA) 108 (90 BASE) MCG/ACT INHALER    Inhale 2 puffs into the lungs 4

## 2025-02-23 NOTE — H&P
Name:  Cassie Brink /Age/Sex: 1985  (40 y.o. female)   MRN & CSN:  [id number] & [id number] Encounter Date/Time: 2025 4:04 PM EST   Location:  [address] [place] [address] PCP: Millicent Cortés MD     Attending:Giuliano Velez MD       Cassie Brink is a 40 y.o. female who presented with     Chief Complaint   Patient presents with    Chest Pain     Chest pain since yesterday evening. Patient also reports associated dizziness, arm pain, jaw pain and pain in her back between her shoulders          Assessment and Plan     Chest pain rule out ACS   Can be from cervical radiculopathy vs GERD vs cardiac cause   Troponin minimally elevated   EKG without ischemic changes   Monitor on telemetry   NPO after midnight   Stress test ordered    Type 2 diabetes mellitus with neuropathy   Continue home insulin regimen and gabapentin   Monitor with fingerstick checks    Essential hypertension   Continue home medications    Anxiety   Continue home medications    CKD stage 3a   Cr around baseline   Monitor with BMP in AM    History of asthma    Not in acute exacerbation    Albuterol PRN    HPI :      Cassie Brink is a 40 y.o. 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 who presented with chief complaints of chest pain that started yesterday. Patient reports having midsternal burning sensation along with left arm and shoulder numbness that has been going on since last few days. Denies any worsening of pain with food intake. Reports it being continuous and not affected by physical exertion. She reports having history of anxiety and that might be contributing. Denies any shortness of breath, nausea, vomiting    Review of Systems:      10 point ROS negative except stated

## 2025-02-24 ENCOUNTER — HOSPITAL ENCOUNTER (INPATIENT)
Age: 40
Discharge: HOME | DRG: 199 | End: 2025-02-26
Payer: MEDICAID

## 2025-02-24 ENCOUNTER — APPOINTMENT (OUTPATIENT)
Dept: NUCLEAR MEDICINE | Age: 40
DRG: 199 | End: 2025-02-24
Payer: MEDICAID

## 2025-02-24 ENCOUNTER — APPOINTMENT (OUTPATIENT)
Dept: GENERAL RADIOLOGY | Age: 40
DRG: 199 | End: 2025-02-24
Payer: MEDICAID

## 2025-02-24 LAB
ANION GAP SERPL CALCULATED.3IONS-SCNC: 9 MMOL/L (ref 3–16)
BASOPHILS # BLD: 0 K/UL (ref 0–0.2)
BASOPHILS NFR BLD: 0.6 %
BUN SERPL-MCNC: 24 MG/DL (ref 7–20)
CALCIUM SERPL-MCNC: 9.2 MG/DL (ref 8.3–10.6)
CHLORIDE SERPL-SCNC: 106 MMOL/L (ref 99–110)
CO2 SERPL-SCNC: 24 MMOL/L (ref 21–32)
CREAT SERPL-MCNC: 1.1 MG/DL (ref 0.6–1.1)
DEPRECATED RDW RBC AUTO: 13.5 % (ref 12.4–15.4)
EKG ATRIAL RATE: 88 BPM
EKG DIAGNOSIS: NORMAL
EKG P AXIS: 57 DEGREES
EKG P-R INTERVAL: 172 MS
EKG Q-T INTERVAL: 358 MS
EKG QRS DURATION: 72 MS
EKG QTC CALCULATION (BAZETT): 433 MS
EKG R AXIS: 37 DEGREES
EKG T AXIS: 33 DEGREES
EKG VENTRICULAR RATE: 88 BPM
EOSINOPHIL # BLD: 0.2 K/UL (ref 0–0.6)
EOSINOPHIL NFR BLD: 3.5 %
EST. AVERAGE GLUCOSE BLD GHB EST-MCNC: 314.9 MG/DL
GFR SERPLBLD CREATININE-BSD FMLA CKD-EPI: 65 ML/MIN/{1.73_M2}
GLUCOSE BLD-MCNC: 118 MG/DL (ref 70–99)
GLUCOSE BLD-MCNC: 168 MG/DL (ref 70–99)
GLUCOSE BLD-MCNC: 245 MG/DL (ref 70–99)
GLUCOSE BLD-MCNC: 95 MG/DL (ref 70–99)
GLUCOSE SERPL-MCNC: 125 MG/DL (ref 70–99)
HBA1C MFR BLD: 12.6 %
HCG UR QL: NEGATIVE
HCT VFR BLD AUTO: 37.3 % (ref 36–48)
HGB BLD-MCNC: 12.3 G/DL (ref 12–16)
LYMPHOCYTES # BLD: 3.1 K/UL (ref 1–5.1)
LYMPHOCYTES NFR BLD: 48.3 %
MCH RBC QN AUTO: 28.5 PG (ref 26–34)
MCHC RBC AUTO-ENTMCNC: 32.9 G/DL (ref 31–36)
MCV RBC AUTO: 86.6 FL (ref 80–100)
MONOCYTES # BLD: 0.5 K/UL (ref 0–1.3)
MONOCYTES NFR BLD: 8.5 %
NEUTROPHILS # BLD: 2.5 K/UL (ref 1.7–7.7)
NEUTROPHILS NFR BLD: 39.1 %
PERFORMED ON: ABNORMAL
PERFORMED ON: NORMAL
PLATELET # BLD AUTO: 285 K/UL (ref 135–450)
PMV BLD AUTO: 8.7 FL (ref 5–10.5)
POTASSIUM SERPL-SCNC: 4.3 MMOL/L (ref 3.5–5.1)
RBC # BLD AUTO: 4.31 M/UL (ref 4–5.2)
SODIUM SERPL-SCNC: 139 MMOL/L (ref 136–145)
WBC # BLD AUTO: 6.4 K/UL (ref 4–11)

## 2025-02-24 PROCEDURE — 3430000000 HC RX DIAGNOSTIC RADIOPHARMACEUTICAL

## 2025-02-24 PROCEDURE — 6370000000 HC RX 637 (ALT 250 FOR IP): Performed by: NURSE PRACTITIONER

## 2025-02-24 PROCEDURE — 6370000000 HC RX 637 (ALT 250 FOR IP): Performed by: FAMILY MEDICINE

## 2025-02-24 PROCEDURE — A9502 TC99M TETROFOSMIN: HCPCS

## 2025-02-24 PROCEDURE — 80048 BASIC METABOLIC PNL TOTAL CA: CPT

## 2025-02-24 PROCEDURE — 6370000000 HC RX 637 (ALT 250 FOR IP)

## 2025-02-24 PROCEDURE — 73030 X-RAY EXAM OF SHOULDER: CPT

## 2025-02-24 PROCEDURE — 2500000003 HC RX 250 WO HCPCS

## 2025-02-24 PROCEDURE — 94760 N-INVAS EAR/PLS OXIMETRY 1: CPT

## 2025-02-24 PROCEDURE — 83036 HEMOGLOBIN GLYCOSYLATED A1C: CPT

## 2025-02-24 PROCEDURE — 85025 COMPLETE CBC W/AUTO DIFF WBC: CPT

## 2025-02-24 PROCEDURE — 93017 CV STRESS TEST TRACING ONLY: CPT

## 2025-02-24 PROCEDURE — 1200000000 HC SEMI PRIVATE

## 2025-02-24 PROCEDURE — 6360000002 HC RX W HCPCS

## 2025-02-24 PROCEDURE — 84703 CHORIONIC GONADOTROPIN ASSAY: CPT

## 2025-02-24 PROCEDURE — 6360000002 HC RX W HCPCS: Performed by: NURSE PRACTITIONER

## 2025-02-24 PROCEDURE — 78452 HT MUSCLE IMAGE SPECT MULT: CPT

## 2025-02-24 PROCEDURE — 72040 X-RAY EXAM NECK SPINE 2-3 VW: CPT

## 2025-02-24 PROCEDURE — 93010 ELECTROCARDIOGRAM REPORT: CPT | Performed by: INTERNAL MEDICINE

## 2025-02-24 PROCEDURE — 36415 COLL VENOUS BLD VENIPUNCTURE: CPT

## 2025-02-24 RX ORDER — TRAMADOL HYDROCHLORIDE 50 MG/1
50 TABLET ORAL EVERY 6 HOURS PRN
Status: DISCONTINUED | OUTPATIENT
Start: 2025-02-24 | End: 2025-02-25 | Stop reason: HOSPADM

## 2025-02-24 RX ORDER — OXYCODONE AND ACETAMINOPHEN 5; 325 MG/1; MG/1
1 TABLET ORAL ONCE
Status: COMPLETED | OUTPATIENT
Start: 2025-02-24 | End: 2025-02-24

## 2025-02-24 RX ORDER — DEXTROSE MONOHYDRATE 100 MG/ML
INJECTION, SOLUTION INTRAVENOUS CONTINUOUS PRN
Status: DISCONTINUED | OUTPATIENT
Start: 2025-02-24 | End: 2025-02-25 | Stop reason: HOSPADM

## 2025-02-24 RX ORDER — METHOCARBAMOL 500 MG/1
500 TABLET, FILM COATED ORAL 3 TIMES DAILY
Status: DISCONTINUED | OUTPATIENT
Start: 2025-02-24 | End: 2025-02-25 | Stop reason: HOSPADM

## 2025-02-24 RX ORDER — HYDRALAZINE HYDROCHLORIDE 20 MG/ML
10 INJECTION INTRAMUSCULAR; INTRAVENOUS ONCE
Status: COMPLETED | OUTPATIENT
Start: 2025-02-24 | End: 2025-02-24

## 2025-02-24 RX ORDER — GLUCAGON 1 MG/ML
1 KIT INJECTION PRN
Status: DISCONTINUED | OUTPATIENT
Start: 2025-02-24 | End: 2025-02-25 | Stop reason: HOSPADM

## 2025-02-24 RX ADMIN — TETROFOSMIN 30.9 MILLICURIE: 1.38 INJECTION, POWDER, LYOPHILIZED, FOR SOLUTION INTRAVENOUS at 09:04

## 2025-02-24 RX ADMIN — TIZANIDINE 4 MG: 4 TABLET ORAL at 07:56

## 2025-02-24 RX ADMIN — AMLODIPINE BESYLATE 10 MG: 10 TABLET ORAL at 07:56

## 2025-02-24 RX ADMIN — GABAPENTIN 800 MG: 400 CAPSULE ORAL at 21:00

## 2025-02-24 RX ADMIN — BUPROPION HYDROCHLORIDE 300 MG: 150 TABLET, EXTENDED RELEASE ORAL at 21:00

## 2025-02-24 RX ADMIN — SODIUM CHLORIDE, PRESERVATIVE FREE 10 ML: 5 INJECTION INTRAVENOUS at 08:04

## 2025-02-24 RX ADMIN — METOPROLOL SUCCINATE 200 MG: 50 TABLET, EXTENDED RELEASE ORAL at 07:57

## 2025-02-24 RX ADMIN — METHOCARBAMOL 500 MG: 500 TABLET ORAL at 13:13

## 2025-02-24 RX ADMIN — TRAMADOL HYDROCHLORIDE 50 MG: 50 TABLET, COATED ORAL at 19:03

## 2025-02-24 RX ADMIN — INSULIN LISPRO 28 UNITS: 100 INJECTION, SOLUTION INTRAVENOUS; SUBCUTANEOUS at 13:14

## 2025-02-24 RX ADMIN — LABETALOL HYDROCHLORIDE 10 MG: 5 INJECTION INTRAVENOUS at 16:26

## 2025-02-24 RX ADMIN — GABAPENTIN 800 MG: 400 CAPSULE ORAL at 07:57

## 2025-02-24 RX ADMIN — INSULIN GLARGINE 36 UNITS: 100 INJECTION, SOLUTION SUBCUTANEOUS at 20:59

## 2025-02-24 RX ADMIN — GABAPENTIN 800 MG: 400 CAPSULE ORAL at 13:13

## 2025-02-24 RX ADMIN — SODIUM CHLORIDE, PRESERVATIVE FREE 10 ML: 5 INJECTION INTRAVENOUS at 21:00

## 2025-02-24 RX ADMIN — DULOXETINE 40 MG: 20 CAPSULE, DELAYED RELEASE ORAL at 21:00

## 2025-02-24 RX ADMIN — OXYCODONE HYDROCHLORIDE AND ACETAMINOPHEN 1 TABLET: 5; 325 TABLET ORAL at 00:28

## 2025-02-24 RX ADMIN — LABETALOL HYDROCHLORIDE 10 MG: 5 INJECTION INTRAVENOUS at 23:42

## 2025-02-24 RX ADMIN — METHOCARBAMOL 500 MG: 500 TABLET ORAL at 20:59

## 2025-02-24 RX ADMIN — INSULIN LISPRO 28 UNITS: 100 INJECTION, SOLUTION INTRAVENOUS; SUBCUTANEOUS at 18:29

## 2025-02-24 RX ADMIN — HYDRALAZINE HYDROCHLORIDE 10 MG: 20 INJECTION INTRAMUSCULAR; INTRAVENOUS at 00:29

## 2025-02-24 RX ADMIN — Medication 12 MG: at 22:44

## 2025-02-24 RX ADMIN — REGADENOSON 0.4 MG: 0.08 INJECTION, SOLUTION INTRAVENOUS at 09:10

## 2025-02-24 ASSESSMENT — PAIN DESCRIPTION - ORIENTATION
ORIENTATION: RIGHT
ORIENTATION: LEFT

## 2025-02-24 ASSESSMENT — PAIN DESCRIPTION - DESCRIPTORS
DESCRIPTORS: ACHING;DISCOMFORT
DESCRIPTORS: ACHING
DESCRIPTORS: ACHING;SQUEEZING

## 2025-02-24 ASSESSMENT — PAIN - FUNCTIONAL ASSESSMENT: PAIN_FUNCTIONAL_ASSESSMENT: ACTIVITIES ARE NOT PREVENTED

## 2025-02-24 ASSESSMENT — PAIN SCALES - GENERAL
PAINLEVEL_OUTOF10: 7
PAINLEVEL_OUTOF10: 10
PAINLEVEL_OUTOF10: 6
PAINLEVEL_OUTOF10: 8

## 2025-02-24 ASSESSMENT — PAIN DESCRIPTION - LOCATION
LOCATION: ARM;SHOULDER
LOCATION: ARM

## 2025-02-24 NOTE — CARE COORDINATION
Discharge Planning:      (CM) reviewed the patient's chart to assess needs. Patient's Readmission Risk Score is 10% . Patient's medical insurance is  Payor: [facility] MEDICAID / Plan: [facility] MEDICAID / Product Type: *No Product type* / .  Patient's PCP is Millicent Cortés MD .  No needs anticipated, at this time. CM team to follow. Staff to inform CM if additional discharge needs arise.    Pts preferred pharmacy is   [facility] [place], [place] - [address] - P [phone] - F [phone]  [address] [facility] [address]  Phone: [phone] Fax: [phone]    [facility] [id number] [place], [place] - [address] - P [phone] - F [phone]  [address] [facility] [address]  Phone: [phone] Fax: [phone]    Please consult SW/Cm if a d/c need should arise.   JOSE Piña  [phone]  Electronically signed by JOSE Laguna on 2/24/2025 at 8:28 AM

## 2025-02-24 NOTE — PLAN OF CARE
Problem: Chronic Conditions and Co-morbidities  Goal: Patient's chronic conditions and co-morbidity symptoms are monitored and maintained or improved  Outcome: Progressing  Flowsheets (Taken 2/23/2025 1834)  Care Plan - Patient's Chronic Conditions and Co-Morbidity Symptoms are Monitored and Maintained or Improved:   Monitor and assess patient's chronic conditions and comorbid symptoms for stability, deterioration, or improvement   Collaborate with multidisciplinary team to address chronic and comorbid conditions and prevent exacerbation or deterioration   Update acute care plan with appropriate goals if chronic or comorbid symptoms are exacerbated and prevent overall improvement and discharge     Problem: Discharge Planning  Goal: Discharge to home or other facility with appropriate resources  Outcome: Progressing  Flowsheets (Taken 2/23/2025 1834)  Discharge to home or other facility with appropriate resources:   Identify barriers to discharge with patient and caregiver   Arrange for needed discharge resources and transportation as appropriate   Identify discharge learning needs (meds, wound care, etc)     Problem: Pain  Goal: Verbalizes/displays adequate comfort level or baseline comfort level  Outcome: Progressing  Flowsheets (Taken 2/23/2025 1725)  Verbalizes/displays adequate comfort level or baseline comfort level:   Encourage patient to monitor pain and request assistance   Assess pain using appropriate pain scale   Administer analgesics based on type and severity of pain and evaluate response   Implement non-pharmacological measures as appropriate and evaluate response   Consider cultural and social influences on pain and pain management   Notify Licensed Independent Practitioner if interventions unsuccessful or patient reports new pain     Problem: Safety - Adult  Goal: Free from fall injury  Outcome: Progressing

## 2025-02-24 NOTE — PLAN OF CARE
Problem: Chronic Conditions and Co-morbidities  Goal: Patient's chronic conditions and co-morbidity symptoms are monitored and maintained or improved  2/23/2025 2233 by Khatiwada, Swastika, RN  Outcome: Progressing  2/23/2025 1942 by Megan Beauchamp RN  Outcome: Progressing  Flowsheets (Taken 2/23/2025 1834)  Care Plan - Patient's Chronic Conditions and Co-Morbidity Symptoms are Monitored and Maintained or Improved:   Monitor and assess patient's chronic conditions and comorbid symptoms for stability, deterioration, or improvement   Collaborate with multidisciplinary team to address chronic and comorbid conditions and prevent exacerbation or deterioration   Update acute care plan with appropriate goals if chronic or comorbid symptoms are exacerbated and prevent overall improvement and discharge     Problem: Discharge Planning  Goal: Discharge to home or other facility with appropriate resources  2/23/2025 2233 by Khatiwada, Swastika, RN  Outcome: Progressing  2/23/2025 1942 by Megan Beauchamp RN  Outcome: Progressing  Flowsheets (Taken 2/23/2025 1834)  Discharge to home or other facility with appropriate resources:   Identify barriers to discharge with patient and caregiver   Arrange for needed discharge resources and transportation as appropriate   Identify discharge learning needs (meds, wound care, etc)     Problem: Pain  Goal: Verbalizes/displays adequate comfort level or baseline comfort level  2/23/2025 2233 by Khatiwada, Swastika, RN  Outcome: Progressing  2/23/2025 1942 by Megan Beauchamp RN  Outcome: Progressing  Flowsheets (Taken 2/23/2025 1725)  Verbalizes/displays adequate comfort level or baseline comfort level:   Encourage patient to monitor pain and request assistance   Assess pain using appropriate pain scale   Administer analgesics based on type and severity of pain and evaluate response   Implement non-pharmacological measures as appropriate and evaluate response   Consider cultural

## 2025-02-24 NOTE — PLAN OF CARE
Problem: Safety - Adult  Goal: Free from fall injury  2/23/2025 2233 by Khatiwada, Swastika, RN  Outcome: Progressing     Problem: Pain  Goal: Verbalizes/displays adequate comfort level or baseline comfort level  2/24/2025 1033 by Fela Hayden RN  Outcome: Progressing  2/23/2025 2233 by Khatiwada, Swastika, RN  Outcome: Progressing     Problem: Discharge Planning  Goal: Discharge to home or other facility with appropriate resources  2/24/2025 1033 by Fela Hayden RN  Outcome: Progressing  2/23/2025 2233 by Khatiwada, Swastika, RN  Outcome: Progressing

## 2025-02-25 VITALS
HEART RATE: 90 BPM | HEIGHT: 70 IN | RESPIRATION RATE: 18 BRPM | OXYGEN SATURATION: 96 % | DIASTOLIC BLOOD PRESSURE: 83 MMHG | BODY MASS INDEX: 41.95 KG/M2 | SYSTOLIC BLOOD PRESSURE: 172 MMHG | WEIGHT: 293 LBS | TEMPERATURE: 97.5 F

## 2025-02-25 LAB
ANION GAP SERPL CALCULATED.3IONS-SCNC: 8 MMOL/L (ref 3–16)
BASOPHILS # BLD: 0 K/UL (ref 0–0.2)
BASOPHILS NFR BLD: 0.8 %
BUN SERPL-MCNC: 20 MG/DL (ref 7–20)
CALCIUM SERPL-MCNC: 9 MG/DL (ref 8.3–10.6)
CHLORIDE SERPL-SCNC: 105 MMOL/L (ref 99–110)
CO2 SERPL-SCNC: 24 MMOL/L (ref 21–32)
CREAT SERPL-MCNC: 1 MG/DL (ref 0.6–1.1)
DEPRECATED RDW RBC AUTO: 13.7 % (ref 12.4–15.4)
ECHO BSA: 2.73 M2
EOSINOPHIL # BLD: 0.2 K/UL (ref 0–0.6)
EOSINOPHIL NFR BLD: 3.5 %
GFR SERPLBLD CREATININE-BSD FMLA CKD-EPI: 73 ML/MIN/{1.73_M2}
GLUCOSE BLD-MCNC: 102 MG/DL (ref 70–99)
GLUCOSE BLD-MCNC: 124 MG/DL (ref 70–99)
GLUCOSE BLD-MCNC: 207 MG/DL (ref 70–99)
GLUCOSE BLD-MCNC: 68 MG/DL (ref 70–99)
GLUCOSE SERPL-MCNC: 150 MG/DL (ref 70–99)
HCT VFR BLD AUTO: 38.2 % (ref 36–48)
HGB BLD-MCNC: 12.6 G/DL (ref 12–16)
LYMPHOCYTES # BLD: 2.2 K/UL (ref 1–5.1)
LYMPHOCYTES NFR BLD: 39.8 %
MCH RBC QN AUTO: 28.3 PG (ref 26–34)
MCHC RBC AUTO-ENTMCNC: 32.9 G/DL (ref 31–36)
MCV RBC AUTO: 86.1 FL (ref 80–100)
MONOCYTES # BLD: 0.4 K/UL (ref 0–1.3)
MONOCYTES NFR BLD: 6.5 %
NEUTROPHILS # BLD: 2.8 K/UL (ref 1.7–7.7)
NEUTROPHILS NFR BLD: 49.4 %
NUC REST DIASTOLIC VOLUME INDEX: 112 ML/M2
NUC REST EJECTION FRACTION: 72 %
NUC REST SYSTOLIC VOLUME INDEX: 31 ML/M2
PERFORMED ON: ABNORMAL
PLATELET # BLD AUTO: 282 K/UL (ref 135–450)
PMV BLD AUTO: 8.6 FL (ref 5–10.5)
POTASSIUM SERPL-SCNC: 4.5 MMOL/L (ref 3.5–5.1)
RBC # BLD AUTO: 4.44 M/UL (ref 4–5.2)
SODIUM SERPL-SCNC: 137 MMOL/L (ref 136–145)
STRESS BASELINE DIAS BP: 106 MMHG
STRESS BASELINE HR: 82 BPM
STRESS BASELINE SYS BP: 172 MMHG
STRESS ESTIMATED WORKLOAD: 1 METS
STRESS EXERCISE DUR MIN: 1 MIN
STRESS EXERCISE DUR SEC: 40 SEC
STRESS PEAK DIAS BP: 106 MMHG
STRESS PEAK SYS BP: 172 MMHG
STRESS PERCENT HR ACHIEVED: 53 %
STRESS POST PEAK HR: 95 BPM
STRESS RATE PRESSURE PRODUCT: NORMAL BPM*MMHG
STRESS ST DEPRESSION: 0 MM
STRESS TARGET HR: 180 BPM
WBC # BLD AUTO: 5.7 K/UL (ref 4–11)

## 2025-02-25 PROCEDURE — A9502 TC99M TETROFOSMIN: HCPCS | Performed by: FAMILY MEDICINE

## 2025-02-25 PROCEDURE — 2500000003 HC RX 250 WO HCPCS

## 2025-02-25 PROCEDURE — 80048 BASIC METABOLIC PNL TOTAL CA: CPT

## 2025-02-25 PROCEDURE — 6370000000 HC RX 637 (ALT 250 FOR IP)

## 2025-02-25 PROCEDURE — 6360000002 HC RX W HCPCS: Performed by: NURSE PRACTITIONER

## 2025-02-25 PROCEDURE — 6360000002 HC RX W HCPCS

## 2025-02-25 PROCEDURE — 36415 COLL VENOUS BLD VENIPUNCTURE: CPT

## 2025-02-25 PROCEDURE — 6370000000 HC RX 637 (ALT 250 FOR IP): Performed by: FAMILY MEDICINE

## 2025-02-25 PROCEDURE — 3430000000 HC RX DIAGNOSTIC RADIOPHARMACEUTICAL: Performed by: FAMILY MEDICINE

## 2025-02-25 PROCEDURE — 94760 N-INVAS EAR/PLS OXIMETRY 1: CPT

## 2025-02-25 PROCEDURE — 85025 COMPLETE CBC W/AUTO DIFF WBC: CPT

## 2025-02-25 RX ORDER — TRIAMCINOLONE ACETONIDE 40 MG/ML
80 INJECTION, SUSPENSION INTRA-ARTICULAR; INTRAMUSCULAR ONCE
Status: COMPLETED | OUTPATIENT
Start: 2025-02-25 | End: 2025-02-25

## 2025-02-25 RX ADMIN — AMLODIPINE BESYLATE 10 MG: 10 TABLET ORAL at 08:32

## 2025-02-25 RX ADMIN — METHOCARBAMOL 500 MG: 500 TABLET ORAL at 08:31

## 2025-02-25 RX ADMIN — INSULIN LISPRO 28 UNITS: 100 INJECTION, SOLUTION INTRAVENOUS; SUBCUTANEOUS at 08:32

## 2025-02-25 RX ADMIN — TRAMADOL HYDROCHLORIDE 50 MG: 50 TABLET, COATED ORAL at 11:12

## 2025-02-25 RX ADMIN — GABAPENTIN 800 MG: 400 CAPSULE ORAL at 08:31

## 2025-02-25 RX ADMIN — TRIAMCINOLONE ACETONIDE 80 MG: 40 INJECTION, SUSPENSION INTRA-ARTICULAR; INTRAMUSCULAR at 14:44

## 2025-02-25 RX ADMIN — TETROFOSMIN 33.4 MILLICURIE: 1.38 INJECTION, POWDER, LYOPHILIZED, FOR SOLUTION INTRAVENOUS at 07:22

## 2025-02-25 RX ADMIN — SODIUM CHLORIDE, PRESERVATIVE FREE 10 ML: 5 INJECTION INTRAVENOUS at 08:32

## 2025-02-25 RX ADMIN — LABETALOL HYDROCHLORIDE 10 MG: 5 INJECTION INTRAVENOUS at 06:06

## 2025-02-25 ASSESSMENT — PAIN SCALES - GENERAL
PAINLEVEL_OUTOF10: 7
PAINLEVEL_OUTOF10: 5
PAINLEVEL_OUTOF10: 4
PAINLEVEL_OUTOF10: 6

## 2025-02-25 ASSESSMENT — PAIN - FUNCTIONAL ASSESSMENT
PAIN_FUNCTIONAL_ASSESSMENT: ACTIVITIES ARE NOT PREVENTED
PAIN_FUNCTIONAL_ASSESSMENT: ACTIVITIES ARE NOT PREVENTED

## 2025-02-25 ASSESSMENT — PAIN DESCRIPTION - LOCATION
LOCATION: SHOULDER
LOCATION: SHOULDER

## 2025-02-25 ASSESSMENT — PAIN DESCRIPTION - PAIN TYPE: TYPE: ACUTE PAIN

## 2025-02-25 ASSESSMENT — PAIN DESCRIPTION - ORIENTATION
ORIENTATION: LEFT
ORIENTATION: LEFT

## 2025-02-25 ASSESSMENT — PAIN DESCRIPTION - DESCRIPTORS
DESCRIPTORS: ACHING;DISCOMFORT
DESCRIPTORS: ACHING;DISCOMFORT

## 2025-02-25 NOTE — CONSULTS
[facility] Orthopedic Surgery  Consult Note    This patient is seen in consultation at the request of Dr Hassan    Reason for Consult:  left shoulder pain    CHIEF COMPLAINT:  left shoulder pain    History Obtained From:  patient, electronic medical record    HISTORY OF PRESENT ILLNESS:    The patient is a 40 y.o. female who presents with left posterior shoulder pain. Pain is described in left posterior shoulder for about a week. No reported injury or overuse left arm Also pain in left neck that radiates down arm at times and with the intensity of moderate. She is alert and oriented. REports diabetic neuropathy in all limbs.     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        Past Surgical History:        Procedure Laterality Date    APPENDECTOMY  12/14/2018     Laparoscopic appendectomy     CHOLECYSTECTOMY, LAPAROSCOPIC  2/07    LAPAROSCOPIC APPENDECTOMY N/A 12/14/2018    LAPAROSCOPIC APPENDECTOMY performed by Shola Ball MD at [facility] OR    OTHER SURGICAL HISTORY  07/18/2014    COLPOSCOPY OF VULVA, VAGINA AND CERVIX WITH CO-2 LASER       Social History     Tobacco Use    Smoking status: Never    Smokeless tobacco: Never   Substance Use Topics    Alcohol use: Yes     Comment: rarely       Family History   Problem Relation Age of Onset    High Blood Pressure Mother     High Blood Pressure Father     Asthma Father     Cancer Maternal Grandmother         breast    Breast Cancer Maternal Grandmother     Diabetes Paternal Grandmother            Current Medications:   Current Facility-Administered Medications: triamcinolone acetonide (KENALOG-40) injection 80 mg, 80 mg, IntraMUSCular, Once  glucose

## 2025-02-25 NOTE — PROGRESS NOTES
V2.0    [facility] Progress Note      Name:  Cassie Brink /Age/Sex: 1985  (40 y.o. female)   MRN & CSN:  [id number] & [id number] Encounter Date/Time: 2025 1:23 PM EST   Location:  [id number] PCP: Millicent Cortés MD     Attending:Noe Hassan MD       Hospital Day: 2    Assessment and Recommendations   Cassie Brink is a 40 y.o. female with pmh of obesity, diabetes mellitus type 2 with neuropathy, hypertension, anxiety, CKD 3A, asthma who presents with Chest pain    Patient was examined this morning.  Mom is at the bedside.  Patient is eating [employer] after her stress test.  Continues to complain of pain mainly left shoulder radiating down left arm  Will obtain cervical spine x-ray, left shoulder x-ray, will trial methocarbamol    Patient underwent part one of the cardiac stress test most likely due weighing more than 150 kg    Hemoglobin A1c was last checked in 2024 to be 12.2 will obtain a new value    Plan:   Chest pain  Most likely musculoskeletal  On exam, Tenderness lower cervical spine radiating down to left shoulder down left arm  Troponin 19, 17  Continuous cardiac monitor shows no ischemic changes  EKG shows no ischemic changes  Cardiac stress test    2.  Diabetes mellitus 2 with neuropathy  Hemoglobin A1c was last obtained on 2024 to be 12.2  Patient states she has very good history of compliance and adherence to insulin however she eats whatever she wants  Lantus twice daily  Insulin sliding scale  Gabapentin 800 mg 3 times a day    3.  Essential hypertension  Patient is currently on amlodipine 10 mg, metoprolol 200 mg  Will monitor vitals further management    4.  Anxiety and depression  Wellbutrin 300 mg nightly, Cymbalta 40 mg nightly    Will continue to follow, monitor and manage chronic medical condition medication listed below    Diet ADULT DIET; Regular; 3 carb choices (45 gm/meal)   DVT Prophylaxis [x] Lovenox, []  Heparin, [] SCDs, [x] Ambulation,  [] Eliquis, 
  Physician Progress Note      PATIENT:               ALEXSI ROSARIO  [facility] #:                  [id number]  :                       1985  ADMIT DATE:       2025 12:52 PM  DISCH DATE:  RESPONDING  PROVIDER #:        Noe Hassan MD          QUERY TEXT:    Pt admitted with chest pain.  Pt noted to have BP of up to 210/128, treated   with IVP labetalol and IVP hydralazine. If possible, please document in   progress notes and discharge summary if you are evaluating and/or treating any   of the following:    The medical record reflects the following:  Risk Factors: 40 year old female w/ chest pain  Clinical Indicators:  BPs- 186/119; 192/97; 200/114; 210/128; 176/108;   177/125.  ED provider- \"Blood pressure remained elevated was given dose   of amlodipine while here in the emergency department. Given her continued   elevation blood pressure and heart score 4 patient will need to be admitted to   the hospital further evaluation for this hypertension and chest pain   symptoms\".  Treatment: BP check, amlodipine 10 mg PO x1, amlodipine 10 mg PO qd,   hydralazine 10 mg IVP x1, labetalol 1 mg IVP q6h PRN (has received 3 as of    at 09:42 am), Toprol  mg PO qd    Hypertensive Urgency: Defined as SBP of >180 OR DBP >120 w/o associated organ   damage. S/s may or may not be present, but can include severe headache, SOB,   epistaxis, severe anxiety. Tx: adjustment of oral BP medication; IV meds not   usually required.  Hypertensive Emergency: SBP of >180 OR DBP >120 w/ associated organ damage   (CVA, MI, acute CHF, MARLA, encephalopathy, pulmonary edema, and unstable   angina). Documentation should include specific organ affected.  Requires   immediate treatment, usually with IV meds.  Hypertensive Crisis, unspecified: SBP of > 180 OR DBP > 120 and includes   damage to blood vessels, including inflammation, leakage of fluid or blood and   can cause stroke, headache, heart failure and 
4 Eyes Skin Assessment     NAME:  Cassie Brink  YOB: 1985  MEDICAL RECORD NUMBER:  [id number]    The patient is being assessed for  Admission    I agree that at least one RN has performed a thorough Head to Toe Skin Assessment on the patient. ALL assessment sites listed below have been assessed.      Areas assessed by both nurses:    Head, Face, Ears, Shoulders, Back, Chest, Arms, Elbows, Hands, Sacrum. Buttock, Coccyx, Ischium, and Legs. Feet and Heels        Does the Patient have a Wound? No noted wound(s)       Joesph Prevention initiated by RN: No  Wound Care Orders initiated by RN: No    Pressure Injury (Stage 3,4, Unstageable, DTI, NWPT, and Complex wounds) if present, place Wound referral order by RN under : No    New Ostomies, if present place, Ostomy referral order under : No     Nurse 1 eSignature: Electronically signed by Megan Beauchamp RN on 2/23/25 at 6:43 PM EST    **SHARE this note so that the co-signing nurse can place an eSignature**    Nurse 2 eSignature: Electronically signed by Jolene Bolanos RN on 2/23/25 at 6:47 PM EST   
Discharge instructions discussed with Pt. IV removed without complications. Dry dressing applied. Tele monitor removed. Pt ambulatory for discharge.  
Medication Reconciliation    List of medications patient is currently taking is complete.     Source of information: 1. Conversation with patient at bedside                                      2. EPIC records      Allergies  Lisinopril and Augmentin [amoxicillin-pot clavulanate]     Notes regarding home medications:     1. Patient received all of her morning home medications, besides her insulins, prior to arrival to the emergency department.      Kelly Green, Pharmacy Intern  2/23/2025 4:01 PM            
Patient admitted from the ED to room 4104 due to chest discomfort and  left shoulder pain.   Patient denies any nausea or vomiting.     Patient alert and oriented x 4.  Answers questions appropriately.  Vitals stable as indicated below     02/23/25 1725   Vital Signs   Temp 97.7 °F (36.5 °C)   Temp Source Oral   Pulse 85   Heart Rate Source Monitor   Respirations 18   BP (!) 177/125   MAP (Calculated) 142   MAP (mmHg) 142   BP Location Right Arm   BP Method Automatic   Patient Position Sitting   Pain Assessment   Pain Assessment 0-10   Pain Level 7   Patient's Stated Pain Goal 0 - No pain   Pain Location Shoulder   Pain Orientation Left   Pain Descriptors Stabbing   Pain Type Acute pain   Pain Radiating Towards arm   Pain Frequency Intermittent   Pain Onset On-going   Non-Pharmaceutical Pain Intervention(s) Family support   Care Plan - Pain Goals   Verbalizes/displays adequate comfort level or baseline comfort level Encourage patient to monitor pain and request assistance;Assess pain using appropriate pain scale;Administer analgesics based on type and severity of pain and evaluate response;Implement non-pharmacological measures as appropriate and evaluate response;Consider cultural and social influences on pain and pain management;Notify Licensed Independent Practitioner if interventions unsuccessful or patient reports new pain   Opioid-Induced Sedation   POSS Score 1   RASS   Presley Agitation Sedation Scale (RASS) 0   Oxygen Therapy   SpO2 100 %   O2 Device None (Room air)      Blood glucose     Latest Reference Range & Units 02/23/25 13:35 02/23/25 18:27   Glucose 70 - 99 mg/dL 286 (H)    POC Glucose 70 - 99 mg/dl  311 (H)        Patient ambulated from stretcher to bed without difficulty     Lungs clear bilaterally  S 1 S 2 audible  SR on tele  Radial and Pedal pulses palpable    Bowels sounds active.   Patient continent of bowel and bladder.  No skin impairment noted      Orders reviewed with patient and family 
Patient alert and oriented X4. Vital signs recorded. Patient requesting sleeping aid. Secure message sent to Caroline Morejon NP. Patient blood sugar 90. Will re-check per hospital policy. Electronically signed by Lara Caruso RN on 2/24/2025 at 10:00 PM     Patient blood pressure 178/98. Followed PRN protocol for elevated blood pressure. See MAR. Will re-check blood pressure per hospital policy. Electronically signed by Lara Caruso RN on 2/24/2025 at 11:35 PM     Patient called out stating Dexacome was stating blood sugar of 60. Hospital glucose check 68. Patient given turkey sandwich and apple juice. Will recheck per hospital policy. Electronically signed by Lara Caruso RN on 2/25/2025 at 12:28 AM     Blood pressure elevated this AM. Followed PRN protocol for elevated blood pressure. See MAR. Electronically signed by Lara Caruso RN on 2/25/2025 at 6:16 AM   
Pt complaining of continued pain to left shoulder and arm.  MD made aware during rounding, see new orders.    Pt off unit and NPO this morning for stress test.  Per MD, okay to give morning insulin dose at lunchtime  
Pt completed first part of 2 part stress test. Electronically signed by Justin N. Schoenung, RN on 2/24/2025 at 4:12 PM    
PRN  polyethylene glycol, 17 g, Daily PRN  acetaminophen, 650 mg, Q6H PRN   Or  acetaminophen, 650 mg, Q6H PRN  albuterol, 2.5 mg, Q6H PRN  labetalol, 10 mg, Q6H PRN        Labs and Imaging   XR CHEST PORTABLE    Result Date: 2/23/2025  EXAMINATION: ONE XRAY VIEW OF THE CHEST 2/23/2025 10:08 am COMPARISON: 04/07/2024 HISTORY: ORDERING SYSTEM PROVIDED HISTORY: chest pain TECHNOLOGIST PROVIDED HISTORY: Reason for exam:->chest pain Reason for Exam: CHEST PAIN FINDINGS: The lungs are without acute focal process.  There is no effusion or pneumothorax. The cardiomediastinal silhouette is stable. The osseous structures are stable.     No acute process.       CBC:   Recent Labs     02/23/25  1335 02/24/25  0405 02/25/25  0544   WBC 5.5 6.4 5.7   HGB 12.2 12.3 12.6    285 282     BMP:    Recent Labs     02/23/25  1335 02/24/25  0405 02/25/25  0544    139 137   K 4.5 4.3 4.5    106 105   CO2 21 24 24   BUN 22* 24* 20   CREATININE 1.2* 1.1 1.0   GLUCOSE 286* 125* 150*     Hepatic:   Recent Labs     02/23/25  1335   AST 14*   ALT 9*   BILITOT <0.2   ALKPHOS 95     Lipids:   Lab Results   Component Value Date/Time    CHOL 161 04/07/2024 10:11 AM    HDL 55 04/07/2024 10:11 AM    TRIG 139 04/07/2024 10:11 AM     Hemoglobin A1C:   Lab Results   Component Value Date/Time    LABA1C 12.6 02/24/2025 01:49 PM     TSH:   Lab Results   Component Value Date/Time    TSH 1.830 05/13/2024 09:50 AM    TSH 1.21 03/22/2023 05:47 PM     Troponin: No results found for: \"TROPONINT\"  Lactic Acid: No results for input(s): \"LACTA\" in the last 72 hours.  BNP: No results for input(s): \"PROBNP\" in the last 72 hours.  UA:  Lab Results   Component Value Date/Time    NITRU Negative 04/09/2024 11:46 AM    COLORU Yellow 04/09/2024 11:46 AM    PHUR 5.0 04/09/2024 11:46 AM    WBCUA 2 04/09/2024 11:46 AM    RBCUA 1 04/09/2024 11:46 AM    YEAST Rare Yeast 02/28/2013 11:17 AM    BACTERIA None Seen 04/09/2024 11:46 AM    CLARITYU Clear 04/09/2024

## 2025-02-25 NOTE — DISCHARGE SUMMARY
Hospital Medicine Discharge Summary    Patient ID: Cassie Brink      Patient's PCP: Millicent Cortés MD    Admit Date: 2/23/2025     Discharge Date: 2/25/2025      Admitting Provider: No admitting provider for patient encounter.     Discharge Provider: Noe Hassan MD     Discharge Diagnoses:       Active Hospital Problems    Diagnosis     Chest pain [R07.9]        The patient was seen and examined on day of discharge and this discharge summary is in conjunction with any daily progress note from day of discharge.    Hospital Course:   Cassie Brink is a 40 y.o. 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  Patient was extensively worked up with no acute finding.  Patient underwent a 2-day stress test due to her weighing more than 150 kg.  No cardiovascular ischemic finding found on her stress test or any cardiovascular workup.    Patient was found to have most likely a musculoskeletal left shoulder pain radiating down her left arm.  As she was very tender in the lower cervical spine radiating down to her shoulder and left arm.  Orthopedic surgery was consulted and recommending a steroid joint injection to left shoulder which she tolerated well.    Hemoglobin A1c was obtained in November 2024 to be 12.2.  Obtained again yesterday on 2/24/2025 to be 12.6.  Patient states that she is very aware of her disease and her disease process as she is now developing neuropathy.  Had an extensive consultation yesterday and today with patient regarding her diabetes, her disease and her disease process she has full understanding of the outcome especially with her continuation to eat anything that she wants.  She states that she is very adherent to her insulin dosing and she does take it on regular basis.  But she does not follow any diet as she was eating [employer] that was brought in by her mom after her stress

## 2025-02-25 NOTE — PLAN OF CARE
Problem: Chronic Conditions and Co-morbidities  Goal: Patient's chronic conditions and co-morbidity symptoms are monitored and maintained or improved  2/25/2025 0803 by Ana María Jean RN  Outcome: Progressing  2/24/2025 2139 by Shahid Jose RN  Outcome: Progressing  Flowsheets (Taken 2/24/2025 2000)  Care Plan - Patient's Chronic Conditions and Co-Morbidity Symptoms are Monitored and Maintained or Improved: Monitor and assess patient's chronic conditions and comorbid symptoms for stability, deterioration, or improvement     Problem: Discharge Planning  Goal: Discharge to home or other facility with appropriate resources  2/25/2025 0803 by Ana María Jean RN  Outcome: Progressing  2/24/2025 2139 by Shahid Jose RN  Outcome: Progressing     Problem: Pain  Goal: Verbalizes/displays adequate comfort level or baseline comfort level  2/25/2025 0803 by Ana María Jean RN  Outcome: Progressing  2/24/2025 2139 by Shahid Jose RN  Outcome: Progressing     Problem: Safety - Adult  Goal: Free from fall injury  2/25/2025 0803 by Ana María Jean RN  Outcome: Progressing  2/24/2025 2139 by Shahid Jose RN  Outcome: Progressing

## 2025-02-25 NOTE — PLAN OF CARE
Problem: Chronic Conditions and Co-morbidities  Goal: Patient's chronic conditions and co-morbidity symptoms are monitored and maintained or improved  2/24/2025 2139 by Shahid Jose, RN  Outcome: Progressing  Flowsheets (Taken 2/24/2025 2000)  Care Plan - Patient's Chronic Conditions and Co-Morbidity Symptoms are Monitored and Maintained or Improved: Monitor and assess patient's chronic conditions and comorbid symptoms for stability, deterioration, or improvement     Problem: Discharge Planning  Goal: Discharge to home or other facility with appropriate resources  2/24/2025 2139 by Shahid Jose, RN  Outcome: Progressing     Problem: Pain  Goal: Verbalizes/displays adequate comfort level or baseline comfort level  2/24/2025 2139 by Sahhid Jsoe, RN  Outcome: Progressing     Problem: Safety - Adult  Goal: Free from fall injury  Outcome: Progressing

## 2025-02-26 ENCOUNTER — TELEPHONE (OUTPATIENT)
Dept: INTERNAL MEDICINE CLINIC | Age: 40
End: 2025-02-26

## 2025-02-26 NOTE — TELEPHONE ENCOUNTER
Care Transitions Initial Follow Up Call    Outreach made within 2 business days of discharge: Yes    Patient: Cassie Brink Patient : 1985   MRN: [id number]  Reason for Admission: Chest Pain   Discharge Date: 25       Spoke with: PT    Discharge department/facility: [facility] Interactive Patient Contact:  Was patient able to fill all prescriptions: NNO's  Was patient instructed to bring all medications to the follow-up visit: No:   Is patient taking all medications as directed in the discharge summary? Yes  Does patient understand their discharge instructions: Yes  Does patient have questions or concerns that need addressed prior to 7-14 day follow up office visit: yes -     Additional needs identified to be addressed with provider               Scheduled appointment with PCP within 7-14 days    Follow Up  Future Appointments   Date Time Provider Department Center   3/6/2025 12:40 PM Millicent Cortés MD [place]       Madelin Aguilar LPN

## 2025-02-28 ENCOUNTER — TELEPHONE (OUTPATIENT)
Dept: INTERNAL MEDICINE CLINIC | Age: 40
End: 2025-02-28

## 2025-02-28 NOTE — TELEPHONE ENCOUNTER
Called patient and left vm for patient to call back to schedule with dietitian. If patient calls back, please offer educational class on 3/4 first.

## 2025-07-04 ENCOUNTER — HOSPITAL ENCOUNTER (EMERGENCY)
Age: 40
Discharge: HOME | End: 2025-07-04
Payer: MEDICAID

## 2025-07-04 ENCOUNTER — APPOINTMENT (OUTPATIENT)
Dept: GENERAL RADIOLOGY | Age: 40
End: 2025-07-04
Payer: MEDICAID

## 2025-07-04 VITALS
HEART RATE: 90 BPM | SYSTOLIC BLOOD PRESSURE: 156 MMHG | RESPIRATION RATE: 16 BRPM | WEIGHT: 293 LBS | DIASTOLIC BLOOD PRESSURE: 110 MMHG | OXYGEN SATURATION: 98 % | TEMPERATURE: 98 F | BODY MASS INDEX: 46.61 KG/M2

## 2025-07-04 DIAGNOSIS — J06.9 ACUTE UPPER RESPIRATORY INFECTION: ICD-10-CM

## 2025-07-04 DIAGNOSIS — J45.21 MILD INTERMITTENT ASTHMA WITH EXACERBATION: Primary | ICD-10-CM

## 2025-07-04 LAB
ALBUMIN SERPL-MCNC: 3.8 G/DL (ref 3.4–5)
ALBUMIN/GLOB SERPL: 1.1 {RATIO} (ref 1.1–2.2)
ALP SERPL-CCNC: 100 U/L (ref 40–129)
ALT SERPL-CCNC: 11 U/L (ref 10–40)
ANION GAP SERPL CALCULATED.3IONS-SCNC: 13 MMOL/L (ref 3–16)
AST SERPL-CCNC: 17 U/L (ref 15–37)
BASE EXCESS BLDV CALC-SCNC: 1.5 MMOL/L
BASOPHILS # BLD: 0.1 K/UL (ref 0–0.2)
BASOPHILS NFR BLD: 1.3 %
BILIRUB SERPL-MCNC: 0.3 MG/DL (ref 0–1)
BUN SERPL-MCNC: 26 MG/DL (ref 7–20)
CALCIUM SERPL-MCNC: 9.9 MG/DL (ref 8.3–10.6)
CHLORIDE SERPL-SCNC: 99 MMOL/L (ref 99–110)
CO2 BLDV-SCNC: 29 MMOL/L
CO2 SERPL-SCNC: 22 MMOL/L (ref 21–32)
COHGB MFR BLDV: 1.1 %
CREAT SERPL-MCNC: 1.1 MG/DL (ref 0.6–1.1)
DEPRECATED RDW RBC AUTO: 14.2 % (ref 12.4–15.4)
EOSINOPHIL # BLD: 0.1 K/UL (ref 0–0.6)
EOSINOPHIL NFR BLD: 2 %
GFR SERPLBLD CREATININE-BSD FMLA CKD-EPI: 65 ML/MIN/{1.73_M2}
GLUCOSE SERPL-MCNC: 227 MG/DL (ref 70–99)
HCO3 BLDV-SCNC: 28 MMOL/L (ref 23–29)
HCT VFR BLD AUTO: 39 % (ref 36–48)
HGB BLD-MCNC: 13 G/DL (ref 12–16)
LACTATE BLDV-SCNC: 2 MMOL/L (ref 0.4–1.9)
LYMPHOCYTES # BLD: 2.5 K/UL (ref 1–5.1)
LYMPHOCYTES NFR BLD: 43.3 %
MCH RBC QN AUTO: 29.1 PG (ref 26–34)
MCHC RBC AUTO-ENTMCNC: 33.4 G/DL (ref 31–36)
MCV RBC AUTO: 87.3 FL (ref 80–100)
METHGB MFR BLDV: 0.4 %
MONOCYTES # BLD: 0.5 K/UL (ref 0–1.3)
MONOCYTES NFR BLD: 8.1 %
NEUTROPHILS # BLD: 2.6 K/UL (ref 1.7–7.7)
NEUTROPHILS NFR BLD: 45.3 %
NT-PROBNP SERPL-MCNC: 253 PG/ML (ref 0–124)
O2 THERAPY: NORMAL
PCO2 BLDV: 48 MMHG (ref 40–50)
PH BLDV: 7.37 [PH] (ref 7.35–7.45)
PLATELET # BLD AUTO: 315 K/UL (ref 135–450)
PMV BLD AUTO: 8.5 FL (ref 5–10.5)
PO2 BLDV: <30 MMHG
POTASSIUM SERPL-SCNC: 4.5 MMOL/L (ref 3.5–5.1)
PROT SERPL-MCNC: 7.2 G/DL (ref 6.4–8.2)
RBC # BLD AUTO: 4.47 M/UL (ref 4–5.2)
SAO2 % BLDV: 55 %
SODIUM SERPL-SCNC: 134 MMOL/L (ref 136–145)
TROPONIN, HIGH SENSITIVITY: 18 NG/L (ref 0–14)
TROPONIN, HIGH SENSITIVITY: 20 NG/L (ref 0–14)
WBC # BLD AUTO: 5.7 K/UL (ref 4–11)

## 2025-07-04 PROCEDURE — 6370000000 HC RX 637 (ALT 250 FOR IP): Performed by: PHYSICIAN ASSISTANT

## 2025-07-04 PROCEDURE — 93005 ELECTROCARDIOGRAM TRACING: CPT | Performed by: PHYSICIAN ASSISTANT

## 2025-07-04 PROCEDURE — 6360000002 HC RX W HCPCS: Performed by: PHYSICIAN ASSISTANT

## 2025-07-04 PROCEDURE — 36415 COLL VENOUS BLD VENIPUNCTURE: CPT

## 2025-07-04 PROCEDURE — 96374 THER/PROPH/DIAG INJ IV PUSH: CPT

## 2025-07-04 PROCEDURE — 83605 ASSAY OF LACTIC ACID: CPT

## 2025-07-04 PROCEDURE — 94761 N-INVAS EAR/PLS OXIMETRY MLT: CPT

## 2025-07-04 PROCEDURE — 85025 COMPLETE CBC W/AUTO DIFF WBC: CPT

## 2025-07-04 PROCEDURE — 99285 EMERGENCY DEPT VISIT HI MDM: CPT

## 2025-07-04 PROCEDURE — 96375 TX/PRO/DX INJ NEW DRUG ADDON: CPT

## 2025-07-04 PROCEDURE — 83880 ASSAY OF NATRIURETIC PEPTIDE: CPT

## 2025-07-04 PROCEDURE — 80053 COMPREHEN METABOLIC PANEL: CPT

## 2025-07-04 PROCEDURE — 71046 X-RAY EXAM CHEST 2 VIEWS: CPT

## 2025-07-04 PROCEDURE — 94640 AIRWAY INHALATION TREATMENT: CPT

## 2025-07-04 PROCEDURE — 84484 ASSAY OF TROPONIN QUANT: CPT

## 2025-07-04 PROCEDURE — 82803 BLOOD GASES ANY COMBINATION: CPT

## 2025-07-04 RX ORDER — KETOROLAC TROMETHAMINE 15 MG/ML
15 INJECTION, SOLUTION INTRAMUSCULAR; INTRAVENOUS ONCE
Status: COMPLETED | OUTPATIENT
Start: 2025-07-04 | End: 2025-07-04

## 2025-07-04 RX ORDER — BENZONATATE 200 MG/1
200 CAPSULE ORAL 3 TIMES DAILY PRN
Qty: 30 CAPSULE | Refills: 0 | Status: SHIPPED | OUTPATIENT
Start: 2025-07-04 | End: 2025-07-14

## 2025-07-04 RX ORDER — IPRATROPIUM BROMIDE AND ALBUTEROL SULFATE 2.5; .5 MG/3ML; MG/3ML
1 SOLUTION RESPIRATORY (INHALATION) ONCE
Status: COMPLETED | OUTPATIENT
Start: 2025-07-04 | End: 2025-07-04

## 2025-07-04 RX ORDER — METHYLPREDNISOLONE SODIUM SUCCINATE 125 MG/2ML
125 INJECTION INTRAMUSCULAR; INTRAVENOUS ONCE
Status: COMPLETED | OUTPATIENT
Start: 2025-07-04 | End: 2025-07-04

## 2025-07-04 RX ORDER — AZITHROMYCIN 250 MG/1
TABLET, FILM COATED ORAL
Qty: 6 TABLET | Refills: 0 | Status: SHIPPED | OUTPATIENT
Start: 2025-07-04 | End: 2025-07-14

## 2025-07-04 RX ORDER — PREDNISONE 20 MG/1
20 TABLET ORAL 2 TIMES DAILY
Qty: 10 TABLET | Refills: 0 | Status: SHIPPED | OUTPATIENT
Start: 2025-07-04 | End: 2025-07-09

## 2025-07-04 RX ORDER — ACETAMINOPHEN 500 MG
500 TABLET ORAL 4 TIMES DAILY PRN
Qty: 360 TABLET | Refills: 1 | Status: SHIPPED | OUTPATIENT
Start: 2025-07-04

## 2025-07-04 RX ADMIN — IPRATROPIUM BROMIDE AND ALBUTEROL SULFATE 1 DOSE: .5; 3 SOLUTION RESPIRATORY (INHALATION) at 14:08

## 2025-07-04 RX ADMIN — METHYLPREDNISOLONE SODIUM SUCCINATE 125 MG: 125 INJECTION, POWDER, LYOPHILIZED, FOR SOLUTION INTRAMUSCULAR; INTRAVENOUS at 11:13

## 2025-07-04 RX ADMIN — KETOROLAC TROMETHAMINE 15 MG: 15 INJECTION, SOLUTION INTRAMUSCULAR; INTRAVENOUS at 13:45

## 2025-07-04 ASSESSMENT — PAIN SCALES - GENERAL: PAINLEVEL_OUTOF10: 6

## 2025-07-04 NOTE — ED NOTES
Pt confirmed d/c paperwork has correct name. Discharge and education instructions reviewed with patient. Teach-back successful.  Pt verbalized understanding and denied questions at this time. No acute distress noted. Patient instructed to follow-up as noted - return to emergency department if symptoms worsen. Patient verbalized understanding. Discharged per EDMD with discharge instructions. Pt discharged to private vehicle. Patient stable upon departure. Thanked patient for choosing [facility] for care. Provider aware of patient pain at time of discharge.

## 2025-07-04 NOTE — ED PROVIDER NOTES
[facility] EMERGENCY DEPARTMENT  EMERGENCY DEPARTMENT ENCOUNTER        Pt Name: Cassie Brink  MRN: [id number]  Birthdate 1985  Date of evaluation: 7/4/2025  Provider: Lisa Ellington PA-C  PCP: Abiodun Lipscomb MD  Note Started: 10:52 AM EDT 7/4/25      WENDIE. I have evaluated this patient.        CHIEF COMPLAINT       Chief Complaint   Patient presents with    Cough     Pt arrives with c/o cough with green sputum x 7 days. States hx of asthma, shortness of breath x 5 days. Pt states she is concerned for pneumonia.      Shortness of Breath       HISTORY OF PRESENT ILLNESS: 1 or more Elements     History From: Patient            Chief Complaint: Cough x 7 days with green sputum    Cassie Brink is a 40 y.o. female who presents with above complaints with associated shortness of breath over the past 5 days.  She states that she does have a history of asthma and feels as if it is an exacerbation of her asthma.  She states that this occurs with seasonal changes.  She has associated chest tightness which is typical with her exacerbations.  She has been using inhaler and nebulizer treatments at home without significant relief.  She denies any fevers, chills, abdominal pain, nausea, vomiting.  No sick exposure.        Nursing Notes were all reviewed and agreed with or any disagreements were addressed in the HPI.    REVIEW OF SYSTEMS :      Review of Systems   All other systems reviewed and are negative.      Positives and Pertinent negatives as per HPI.     SURGICAL HISTORY     Past Surgical History:   Procedure Laterality Date    APPENDECTOMY  12/14/2018     Laparoscopic appendectomy     CHOLECYSTECTOMY, LAPAROSCOPIC  2/07    LAPAROSCOPIC APPENDECTOMY N/A 12/14/2018    LAPAROSCOPIC APPENDECTOMY performed by Shola Ball MD at [facility] OR    OTHER SURGICAL HISTORY  07/18/2014    COLPOSCOPY OF VULVA, VAGINA AND CERVIX WITH CO-2 LASER       CURRENTMEDICATIONS       Discharge Medication List as of 7/4/2025  2:23    Perfusion Defect Conclusion There is no evidence of transient ischemic dilation (TID).   Rest Function Comments Left ventricular function at rest was normal. Resting ejection fraction was 72%. Rest end diastolic index: 112.0 mL/m2. Rest end systolic index: 31.0 mL/m2.   Stress Combined Conclusion The study is negative for myocardial ischemia. Findings suggest a low risk of cardiac events.       CC/HPI Summary, DDx, ED Course, and Reassessment: This is a 40-year-old female who presents to the emergency department with complaint of a productive cough over the past 7 days with associated shortness of breath and chest tightness over the past 5 days.  Her vitals initially upon arrival show hypertension, tachycardia.  She is hypertensive at 172/98, manual pressure.  She is saturating well on room air at 100%.  With her significant symptoms we discussed proceeding with blood testing and imaging.  Blood testing was performed showing no acute findings. Downtrending troponin. Similar to baseline. No longer having chest pain.   Imaging was performed reviewed and read by radiologist as well as myself as above showing no acute findings.  Meanwhile she was given Solu-Medrol.  With her extensive coughing in the room I elected to wait on breathing treatment until she received her Solu-Medrol.  She was then given DuoNeb, toradol with symptom improvement.     I discussed with patient and offered her admission with her troponins being elevated with her symptoms.  She does not want to be admitted.  I will discharge her with medication including Tessalon Perles and steroids.  Had discussion about antibiotics and how this is likely viral, however she would like antibiotics.  Antibiotic resistance, risk and side effects discussed.  However she wanted to proceed.  These were ordered.  She will be discharged in stable condition, however we discussed that if her symptoms worsen or new concerning symptoms present to return back to the

## 2025-07-05 LAB
EKG ATRIAL RATE: 96 BPM
EKG DIAGNOSIS: NORMAL
EKG P AXIS: 45 DEGREES
EKG P-R INTERVAL: 154 MS
EKG Q-T INTERVAL: 346 MS
EKG QRS DURATION: 76 MS
EKG QTC CALCULATION (BAZETT): 437 MS
EKG R AXIS: 17 DEGREES
EKG T AXIS: 21 DEGREES
EKG VENTRICULAR RATE: 96 BPM

## 2025-07-05 PROCEDURE — 93010 ELECTROCARDIOGRAM REPORT: CPT | Performed by: INTERNAL MEDICINE

## (undated) DEVICE — SUTURE VCRL SZ 4-0 L18IN ABSRB UD L19MM PS-2 3/8 CIR PRIM J496H

## (undated) DEVICE — GARMENT COMPR STD FOR 17IN CALF UNIF THER FLOTRN

## (undated) DEVICE — GLOVE ORANGE PI 7 1/2   MSG9075

## (undated) DEVICE — LAPAROSCOPY PK

## (undated) DEVICE — SUTURE SZ 0 27IN 5/8 CIR UR-6  TAPER PT VIOLET ABSRB VICRYL J603H

## (undated) DEVICE — GOWN SIRUS NONREIN XL W/TWL: Brand: MEDLINE INDUSTRIES, INC.

## (undated) DEVICE — SOLUTION IV 1000ML 0.9% SOD CHL FOR IRRIG PLAS CONT

## (undated) DEVICE — CHLORAPREP 26ML ORANGE

## (undated) DEVICE — CANISTER, RIGID, 1200CC: Brand: MEDLINE INDUSTRIES, INC.

## (undated) DEVICE — COVER LT HNDL BLU PLAS

## (undated) DEVICE — TROCAR ENDOSCP L100MM DIA12MM DIL TIP OBT RADLUC STBL SL

## (undated) DEVICE — BAG SPEC REM 224ML W4XL6IN DIA10MM 1 HND GYN DISP ENDOPCH

## (undated) DEVICE — RELOAD STPL SZ 0 L45MM DIA3.5MM 0DEG STD REG TISS BLU TI

## (undated) DEVICE — DRAPE,LAP,CHOLE,W/TROUGHS,STERILE: Brand: MEDLINE

## (undated) DEVICE — CUTTER ENDOSCP L340MM LIN ARTC SGL STROKE FIRING ENDOPATH

## (undated) DEVICE — PMI DISPOSABLE PUNCTURE CLOSURE DEVICE / SUTURE GRASPER: Brand: PMI

## (undated) DEVICE — TROCAR ENDOSCP SHFT L100MM DIA5MM DIL TIP ENDOPATH XCEL

## (undated) DEVICE — GLOVE ORANGE PI 7   MSG9070

## (undated) DEVICE — NEEDLE HYPO 25GA L1.5IN BVL ORIENTED ECLIPSE

## (undated) DEVICE — SEALER ENDOSCP L37CM NANO COAT BLNT TIP LAP DIV

## (undated) DEVICE — KIT OR ROOM TURNOVER W/STRAP

## (undated) DEVICE — RELOAD STPL H1-2.5X45MM VASC THN TISS WHT 6 ROW B FRM SGL

## (undated) DEVICE — TROCAR ENDOSCP L100MM DIA5MM BLDELSS STBL SL OBT RADLUC

## (undated) DEVICE — SKIN AFFIX SURG ADHESIVE 72/CS 0.55ML: Brand: MEDLINE

## (undated) DEVICE — ELECTRODE PT RET AD L9FT HI MOIST COND ADH HYDRGEL CORDED

## (undated) DEVICE — NEEDLE INSUF L120MM DIA2MM DISP FOR PNEUMOPERI ENDOPATH

## (undated) DEVICE — SOLUTION IV IRRIG POUR BRL 0.9% SODIUM CHL 2F7124

## (undated) DEVICE — [HIGH FLOW INSUFFLATOR,  DO NOT USE IF PACKAGE IS DAMAGED,  KEEP DRY,  KEEP AWAY FROM SUNLIGHT,  PROTECT FROM HEAT AND RADIOACTIVE SOURCES.]: Brand: PNEUMOSURE